# Patient Record
Sex: FEMALE | Race: WHITE | HISPANIC OR LATINO | Employment: OTHER | URBAN - METROPOLITAN AREA
[De-identification: names, ages, dates, MRNs, and addresses within clinical notes are randomized per-mention and may not be internally consistent; named-entity substitution may affect disease eponyms.]

---

## 2017-01-17 ENCOUNTER — OFFICE VISIT (OUTPATIENT)
Dept: INTERNAL MEDICINE | Facility: CLINIC | Age: 82
End: 2017-01-17
Attending: INTERNAL MEDICINE
Payer: MEDICARE

## 2017-01-17 VITALS
HEART RATE: 76 BPM | HEIGHT: 58 IN | OXYGEN SATURATION: 90 % | DIASTOLIC BLOOD PRESSURE: 86 MMHG | SYSTOLIC BLOOD PRESSURE: 126 MMHG | WEIGHT: 131 LBS | BODY MASS INDEX: 27.5 KG/M2

## 2017-01-17 DIAGNOSIS — N18.30 TYPE 2 DIABETES MELLITUS WITH STAGE 3 CHRONIC KIDNEY DISEASE, WITHOUT LONG-TERM CURRENT USE OF INSULIN: ICD-10-CM

## 2017-01-17 DIAGNOSIS — E11.40 TYPE 2 DIABETES MELLITUS WITH DIABETIC NEUROPATHY, WITHOUT LONG-TERM CURRENT USE OF INSULIN: ICD-10-CM

## 2017-01-17 DIAGNOSIS — Z00.00 ROUTINE HISTORY AND PHYSICAL EXAMINATION OF ADULT: ICD-10-CM

## 2017-01-17 DIAGNOSIS — D69.6 THROMBOCYTOPENIA: ICD-10-CM

## 2017-01-17 DIAGNOSIS — I10 ESSENTIAL HYPERTENSION: ICD-10-CM

## 2017-01-17 DIAGNOSIS — E03.9 ACQUIRED HYPOTHYROIDISM: ICD-10-CM

## 2017-01-17 DIAGNOSIS — D50.9 IRON DEFICIENCY ANEMIA, UNSPECIFIED IRON DEFICIENCY ANEMIA TYPE: Primary | ICD-10-CM

## 2017-01-17 DIAGNOSIS — E11.22 TYPE 2 DIABETES MELLITUS WITH STAGE 3 CHRONIC KIDNEY DISEASE, WITHOUT LONG-TERM CURRENT USE OF INSULIN: ICD-10-CM

## 2017-01-17 DIAGNOSIS — E11.49 TYPE II OR UNSPECIFIED TYPE DIABETES MELLITUS WITH NEUROLOGICAL MANIFESTATIONS, NOT STATED AS UNCONTROLLED(250.60): ICD-10-CM

## 2017-01-17 PROCEDURE — 1159F MED LIST DOCD IN RCRD: CPT | Mod: S$GLB,,, | Performed by: INTERNAL MEDICINE

## 2017-01-17 PROCEDURE — 3079F DIAST BP 80-89 MM HG: CPT | Mod: S$GLB,,, | Performed by: INTERNAL MEDICINE

## 2017-01-17 PROCEDURE — 1160F RVW MEDS BY RX/DR IN RCRD: CPT | Mod: S$GLB,,, | Performed by: INTERNAL MEDICINE

## 2017-01-17 PROCEDURE — 99214 OFFICE O/P EST MOD 30 MIN: CPT | Mod: 25,S$GLB,, | Performed by: INTERNAL MEDICINE

## 2017-01-17 PROCEDURE — 3074F SYST BP LT 130 MM HG: CPT | Mod: S$GLB,,, | Performed by: INTERNAL MEDICINE

## 2017-01-17 PROCEDURE — 1157F ADVNC CARE PLAN IN RCRD: CPT | Mod: S$GLB,,, | Performed by: INTERNAL MEDICINE

## 2017-01-17 RX ORDER — GABAPENTIN 100 MG/1
4 CAPSULE ORAL NIGHTLY
Refills: 1 | COMMUNITY
Start: 2016-11-16 | End: 2017-05-22

## 2017-01-17 NOTE — MR AVS SNAPSHOT
[facility]  [address] [facility], [address] [place] [address]  Phone: [phone]  Fax: [phone]                  Ashely Holman   2017 3:30 PM   Office Visit    Descripción:  Female : 3/29/1934   Personal Médico:  RODRIGO Badillo MD   Departamento:  [place]           Razón de la stephanie     Follow-up     Generalized Body Aches           Diagnósticos de Esta Visita        Comentarios    Iron deficiency anemia, unspecified iron deficiency anemia type    -  Primario     Acquired hypothyroidism         Thrombocytopenia         Type II or unspecified type diabetes mellitus with neurological manifestations, not stated as uncontrolled         Essential hypertension         Type 2 diabetes mellitus with stage 3 chronic kidney disease, without long-term current use of insulin         Routine history and physical examination of adult                Lista de tareas           Citas próximas        Personal Médico Departamento Tfno del dpto    2017 2:30 PM MD [facility] Pham [phone]      Metas (5 Years of Data)     Ninguna      Follow-Up and Disposition     Return in about 4 months (around 2017).      [facility]     [facility] - Asistencia   Enfermeras registradas de [facility] pueden ayudarle a reservar angela stephanie, proveer educación para la lio, asesoría clínica, y otros servicios de asesoramiento.   Llame para kathryn servicio gratuito a [phone].             Medicamentos           Mensaje sobre Medicamentos     Verificar los cambios y / o adiciones a mortensen régimen de medicación son los mismos que discutir con mortensen médico. Si cualquiera de estos cambios o adiciones son incorrectos, por favor notifique a mortensen proveedor de atención médica.        DEJAR de lisa estos medicamentos     azithromycin (Z-CLAUDIA) 250 MG tablet Take 2 tablets by mouth on day 1; Take 1 tablet by mouth on days 2-5           Verifique que la siguiente lista de medicamentos es angela  "representación exacta de los medicamentos que está tomando actualmente. Si no hay ningunos reportados, la lista puede estar en de leon. Si no es correcta, por favor póngase en contacto con mortensen proveedor de atención médica. Lleve esta lista con usted en haley de emergencia.           Medicamentos Actuales     albuterol-ipratropium 2.5mg-0.5mg/3mL (DUO-NEB) 0.5 mg-3 mg(2.5 mg base)/3 mL nebulizer solution Take 3 mLs by nebulization every 4 (four) hours.    budesonide-formoterol 160-4.5 mcg (SYMBICORT) 160-4.5 mcg/actuation HFAA Inhale 2 puffs into the lungs every 12 (twelve) hours.    cetirizine (ZYRTEC) 10 MG tablet Take 10 mg by mouth every evening.    cholecalciferol, vitamin D3, 2,000 unit Cap Take 1 capsule by mouth once daily.    ferrous gluconate (FERGON) 325 MG Tab Take 1 tablet (325 mg total) by mouth 2 (two) times daily with meals.    gabapentin (NEURONTIN) 100 MG capsule Take 4 capsules by mouth every evening.    levothyroxine (SYNTHROID) 88 MCG tablet Take 0.5 tablets (44 mcg total) by mouth once daily.    losartan (COZAAR) 100 MG tablet TAKE 1 TABLET BY MOUTH EVERY DAY    mirtazapine (REMERON) 15 MG tablet Take 1 tablet (15 mg total) by mouth every evening.    NEXIUM 40 mg capsule TAKE 1 CAPSULE BY MOUTH EVERY DAY    travoprost, benzalkonium, (TRAVATAN) 0.004 % ophthalmic solution 1 drop every evening.           Información de referencia clínica           Signos vitales - más recientes  Última actualización: 1/17/2017  3:33 PM por Mira Duran, MA    PS [place] SpO2 BMI ([facility] [place])    126/86 76 4' 10" (1.473 m) 59.4 kg (131 lb) (!) 90% 27.38 kg/m2      Blood Pressure          Most Recent Value    BP  126/86      Alergias     A partir del:  1/17/2017        Lyrica [Pregabalin]    Cymbalta [Duloxetine]      Vacunas     Administradas en la fecha de la visita:  1/17/2017        None      Orders Placed During Today's Visit      Órdenes normales de esta visita    CBC auto differential     Comprehensive " Metabolic Panel (Refl)     Hemoglobin A1c     Iron and TIBC     T4, free     TSH     Exámenes/Procedimientos futuros Se espera el Vence    CBC auto differential  2017 (Approximate) 3/18/2018    Comprehensive Metabolic Panel (Refl)  2017 3/18/2018    Hemoglobin A1c  2017 (Approximate) 3/18/2018    Iron and TIBC  2017    T4, free  2017 (Approximate) 2018    TSH  2017 (Approximate) 3/18/2018               Ashely Holman   2017 3:30 PM   Office Visit    Description:  Female : 3/29/1934   Provider:  RODRIGO Badillo MD   Department:  [facility]           Reason for Visit     Follow-up     Generalized Body Aches           Diagnoses this Visit        Comments    Iron deficiency anemia, unspecified iron deficiency anemia type    -  Primary     Acquired hypothyroidism         Thrombocytopenia         Type II or unspecified type diabetes mellitus with neurological manifestations, not stated as uncontrolled         Essential hypertension         Type 2 diabetes mellitus with stage 3 chronic kidney disease, without long-term current use of insulin         Routine history and physical examination of adult                To Do List           Future Appointments        Provider Department Dept Phone    2017 2:30 PM MD [facility] Pham [phone]      Goals     None      Follow-Up and Disposition     Return in about 4 months (around 2017).      [facility] Care [facility] - 24/7 Assistance  Registered nurses in the [facility] provide clinical advisement, health education, appointment booking, and other advisory services.  Call for this free service at [phone].             Medications           Message regarding Medications     Verify the changes and/or additions to your medication regime listed below are the same as discussed with your clinician today.  If any of these changes or additions are incorrect, please notify your  "healthcare provider.        STOP taking these medications     azithromycin (Z-CLAUDIA) 250 MG tablet Take 2 tablets by mouth on day 1; Take 1 tablet by mouth on days 2-5           Verify that the below list of medications is an accurate representation of the medications you are currently taking.  If none reported, the list may be blank. If incorrect, please contact your healthcare provider. Carry this list with you in case of emergency.           Current Medications     albuterol-ipratropium 2.5mg-0.5mg/3mL (DUO-NEB) 0.5 mg-3 mg(2.5 mg base)/3 mL nebulizer solution Take 3 mLs by nebulization every 4 (four) hours.    budesonide-formoterol 160-4.5 mcg (SYMBICORT) 160-4.5 mcg/actuation HFAA Inhale 2 puffs into the lungs every 12 (twelve) hours.    cetirizine (ZYRTEC) 10 MG tablet Take 10 mg by mouth every evening.    cholecalciferol, vitamin D3, 2,000 unit Cap Take 1 capsule by mouth once daily.    ferrous gluconate (FERGON) 325 MG Tab Take 1 tablet (325 mg total) by mouth 2 (two) times daily with meals.    gabapentin (NEURONTIN) 100 MG capsule Take 4 capsules by mouth every evening.    levothyroxine (SYNTHROID) 88 MCG tablet Take 0.5 tablets (44 mcg total) by mouth once daily.    losartan (COZAAR) 100 MG tablet TAKE 1 TABLET BY MOUTH EVERY DAY    mirtazapine (REMERON) 15 MG tablet Take 1 tablet (15 mg total) by mouth every evening.    NEXIUM 40 mg capsule TAKE 1 CAPSULE BY MOUTH EVERY DAY    travoprost, benzalkonium, (TRAVATAN) 0.004 % ophthalmic solution 1 drop every evening.           Clinical Reference Information           Vital Signs - Last Recorded  Most recent update: 1/17/2017  3:33 PM by Mira Duran MA    BP Pulse Ht Wt SpO2 BMI    126/86 76 4' 10" (1.473 m) 59.4 kg (131 lb) (!) 90% 27.38 kg/m2      Blood Pressure          Most Recent Value    BP  126/86      Allergies as of 1/17/2017     Lyrica [Pregabalin]    Cymbalta [Duloxetine]      Immunizations Administered on Date of Encounter - 1/17/2017     None "      Orders Placed During Today's Visit      Normal Orders This Visit    CBC auto differential     Comprehensive Metabolic Panel (Refl)     Hemoglobin A1c     Iron and TIBC     T4, free     TSH     Future Labs/Procedures Expected by Expires    CBC auto differential  1/17/2017 (Approximate) 3/18/2018    Comprehensive Metabolic Panel (Refl)  1/17/2017 3/18/2018    Hemoglobin A1c  1/17/2017 (Approximate) 3/18/2018    Iron and TIBC  1/17/2017 1/17/2018    T4, free  1/17/2017 (Approximate) 1/17/2018    TSH  1/17/2017 (Approximate) 3/18/2018

## 2017-01-17 NOTE — PROGRESS NOTES
Subjective:       Patient ID: Ashely Holman is a 82 y.o. female.    Chief Complaint: Follow-up and Generalized Body Aches (wants to increase the Gabapentin)    HPI Comments: Wants to increase Celso for neuropathy.  Depression much better on Remeron.  Asthma doing well.    Hypertension   This is a chronic problem. The current episode started more than 1 year ago. The problem is controlled. Pertinent negatives include no chest pain or shortness of breath.   Diabetes   She presents for her follow-up diabetic visit. She has type 2 diabetes mellitus. Her disease course has been stable. Pertinent negatives for diabetes include no chest pain.     Review of Systems   Constitutional: Negative.    Respiratory: Negative for shortness of breath.    Cardiovascular: Negative for chest pain.   Neurological: Positive for numbness.   Psychiatric/Behavioral: Negative for dysphoric mood.       Objective:      Physical Exam   Constitutional: She appears well-developed and well-nourished.   HENT:   Head: Normocephalic.   Eyes: Pupils are equal, round, and reactive to light.   Cardiovascular: Normal rate, regular rhythm and normal heart sounds.  Exam reveals no friction rub.    Pulmonary/Chest: Effort normal.   Neurological: She is alert.       Assessment:       1. Iron deficiency anemia, unspecified iron deficiency anemia type    2. Acquired hypothyroidism    3. Thrombocytopenia    4. Type II or unspecified type diabetes mellitus with neurological manifestations, not stated as uncontrolled    5. Essential hypertension    6. Type 2 diabetes mellitus with stage 3 chronic kidney disease, without long-term current use of insulin    7. Routine history and physical examination of adult    8. 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  Continue meds/diet for DM, HTN, hypothyroid, and asthma, which are stable. Inc Celso for neuropathy. Check labs for thrombocytopenia.    Screening: The patient was  screened for depression with the PHQ2 questionnaire and possible health consequences were discussed with the patient, who understands (15 minutes spent). The patient was screened for the misuse of alcohol, by asking the number of drinks per average week, and if pt has had more than 4 drinks (more than 3 for women and elderly) in 1 day within the past year. The health and legal consequences of misuse were discussed (15 minutes spent). The patient was screened for obesity (BMI>30), If the current BMI > 30, then the possible consequences of obesity, as well as the benefits of diet, exercise, and weight loss were discussed (15 minutes spent).

## 2017-02-05 LAB
ALBUMIN SERPL-MCNC: 4 G/DL (ref 3.6–5.1)
ALBUMIN/GLOB SERPL: 1.4 (CALC) (ref 1–2.5)
ALP SERPL-CCNC: 68 U/L (ref 33–130)
ALT SERPL-CCNC: 6 U/L (ref 6–29)
AST SERPL-CCNC: 11 U/L (ref 10–35)
BASOPHILS # BLD AUTO: 24 CELLS/UL (ref 0–200)
BASOPHILS NFR BLD AUTO: 0.4 %
BILIRUB SERPL-MCNC: 0.2 MG/DL (ref 0.2–1.2)
BUN SERPL-MCNC: 24 MG/DL (ref 7–25)
BUN/CREAT SERPL: 16 (CALC) (ref 6–22)
CALCIUM SERPL-MCNC: 9.8 MG/DL (ref 8.6–10.4)
CHLORIDE SERPL-SCNC: 108 MMOL/L (ref 98–110)
CO2 SERPL-SCNC: 25 MMOL/L (ref 20–31)
CREAT SERPL-MCNC: 1.48 MG/DL (ref 0.6–0.88)
EOSINOPHIL # BLD AUTO: 287 CELLS/UL (ref 15–500)
EOSINOPHIL NFR BLD AUTO: 4.7 %
ERYTHROCYTE [DISTWIDTH] IN BLOOD BY AUTOMATED COUNT: 16.6 % (ref 11–15)
GFR SERPL CREATININE-BSD FRML MDRD: 33 ML/MIN/1.73M2
GLOBULIN SER CALC-MCNC: 2.9 G/DL (CALC) (ref 1.9–3.7)
GLUCOSE SERPL-MCNC: 98 MG/DL (ref 65–99)
HBA1C MFR BLD: 5.7 % OF TOTAL HGB
HCT VFR BLD AUTO: 33.2 % (ref 35–45)
HGB BLD-MCNC: 10.7 G/DL (ref 11.7–15.5)
IRON SATN MFR SERPL: 13 % (CALC) (ref 11–50)
IRON SERPL-MCNC: 44 MCG/DL (ref 45–160)
LYMPHOCYTES # BLD AUTO: 1305 CELLS/UL (ref 850–3900)
LYMPHOCYTES NFR BLD AUTO: 21.4 %
MCH RBC QN AUTO: 27.7 PG (ref 27–33)
MCHC RBC AUTO-ENTMCNC: 32.4 G/DL (ref 32–36)
MCV RBC AUTO: 85.6 FL (ref 80–100)
MONOCYTES # BLD AUTO: 653 CELLS/UL (ref 200–950)
MONOCYTES NFR BLD AUTO: 10.7 %
NEUTROPHILS # BLD AUTO: 3831 CELLS/UL (ref 1500–7800)
NEUTROPHILS NFR BLD AUTO: 62.8 %
PLATELET # BLD AUTO: 115 THOUSAND/UL (ref 140–400)
PMV BLD REES-ECKER: 9.7 FL (ref 7.5–12.5)
POTASSIUM SERPL-SCNC: 4 MMOL/L (ref 3.5–5.3)
PROT SERPL-MCNC: 6.9 G/DL (ref 6.1–8.1)
RBC # BLD AUTO: 3.88 MILLION/UL (ref 3.8–5.1)
SODIUM SERPL-SCNC: 139 MMOL/L (ref 135–146)
T4 FREE SERPL-MCNC: 1.4 NG/DL (ref 0.8–1.8)
TIBC SERPL-MCNC: 343 MCG/DL (CALC) (ref 250–450)
TSH SERPL-ACNC: 0.14 MIU/L (ref 0.4–4.5)
WBC # BLD AUTO: 6.1 THOUSAND/UL (ref 3.8–10.8)

## 2017-02-09 DIAGNOSIS — R51.9 HEADACHE, UNSPECIFIED HEADACHE TYPE: Primary | ICD-10-CM

## 2017-05-22 ENCOUNTER — OFFICE VISIT (OUTPATIENT)
Dept: INTERNAL MEDICINE | Facility: CLINIC | Age: 82
End: 2017-05-22
Attending: INTERNAL MEDICINE
Payer: MEDICARE

## 2017-05-22 VITALS
BODY MASS INDEX: 27.08 KG/M2 | SYSTOLIC BLOOD PRESSURE: 130 MMHG | WEIGHT: 129 LBS | HEART RATE: 102 BPM | DIASTOLIC BLOOD PRESSURE: 88 MMHG | OXYGEN SATURATION: 99 % | HEIGHT: 58 IN

## 2017-05-22 DIAGNOSIS — R41.0 CONFUSION: ICD-10-CM

## 2017-05-22 DIAGNOSIS — I10 ESSENTIAL HYPERTENSION: ICD-10-CM

## 2017-05-22 DIAGNOSIS — D50.9 IRON DEFICIENCY ANEMIA, UNSPECIFIED IRON DEFICIENCY ANEMIA TYPE: ICD-10-CM

## 2017-05-22 DIAGNOSIS — E03.9 ACQUIRED HYPOTHYROIDISM: ICD-10-CM

## 2017-05-22 DIAGNOSIS — E11.22 TYPE 2 DIABETES MELLITUS WITH STAGE 3 CHRONIC KIDNEY DISEASE, WITHOUT LONG-TERM CURRENT USE OF INSULIN: ICD-10-CM

## 2017-05-22 DIAGNOSIS — Z00.00 ROUTINE HISTORY AND PHYSICAL EXAMINATION OF ADULT: ICD-10-CM

## 2017-05-22 DIAGNOSIS — N18.30 CKD (CHRONIC KIDNEY DISEASE) STAGE 3, GFR 30-59 ML/MIN: Primary | ICD-10-CM

## 2017-05-22 DIAGNOSIS — J45.20 MILD INTERMITTENT ASTHMA WITHOUT COMPLICATION: ICD-10-CM

## 2017-05-22 DIAGNOSIS — N18.30 TYPE 2 DIABETES MELLITUS WITH STAGE 3 CHRONIC KIDNEY DISEASE, WITHOUT LONG-TERM CURRENT USE OF INSULIN: ICD-10-CM

## 2017-05-22 DIAGNOSIS — R05.9 COUGH: ICD-10-CM

## 2017-05-22 PROCEDURE — 1159F MED LIST DOCD IN RCRD: CPT | Mod: S$GLB,,, | Performed by: INTERNAL MEDICINE

## 2017-05-22 PROCEDURE — 1160F RVW MEDS BY RX/DR IN RCRD: CPT | Mod: S$GLB,,, | Performed by: INTERNAL MEDICINE

## 2017-05-22 PROCEDURE — 3075F SYST BP GE 130 - 139MM HG: CPT | Mod: S$GLB,,, | Performed by: INTERNAL MEDICINE

## 2017-05-22 PROCEDURE — 99215 OFFICE O/P EST HI 40 MIN: CPT | Mod: S$GLB,,, | Performed by: INTERNAL MEDICINE

## 2017-05-22 PROCEDURE — 3079F DIAST BP 80-89 MM HG: CPT | Mod: S$GLB,,, | Performed by: INTERNAL MEDICINE

## 2017-05-22 RX ORDER — DOXYCYCLINE HYCLATE 100 MG
100 TABLET ORAL 2 TIMES DAILY
Qty: 14 TABLET | Refills: 0 | Status: SHIPPED | OUTPATIENT
Start: 2017-05-22 | End: 2017-09-25

## 2017-05-22 RX ORDER — TRAZODONE HYDROCHLORIDE 50 MG/1
TABLET ORAL
Refills: 3 | COMMUNITY
Start: 2017-05-14 | End: 2017-09-11 | Stop reason: SDUPTHER

## 2017-05-22 RX ORDER — BUDESONIDE AND FORMOTEROL FUMARATE DIHYDRATE 160; 4.5 UG/1; UG/1
2 AEROSOL RESPIRATORY (INHALATION) EVERY 12 HOURS
Qty: 10.2 G | Refills: 5 | Status: SHIPPED | OUTPATIENT
Start: 2017-05-22 | End: 2018-08-21

## 2017-05-22 NOTE — PROGRESS NOTES
Subjective:       Patient ID: Ashely Holman is a 83 y.o. female.    Chief Complaint: Follow-up (4 month / wants to take her off Gabapentin (daughter says she hallucinates)); Cough (deep cough); Shortness of Breath; and Headache    Cough started yest.  Took Celso and Tramadol a few weeks ago and was confused for a few days. Off Celso now.  Recent neck pain.      Cough   This is a recurrent problem. The current episode started yesterday. The problem has been unchanged. Pertinent negatives include no chest pain or shortness of breath.   Diabetes   She has type 2 diabetes mellitus. Her disease course has been stable. Associated symptoms include fatigue. Pertinent negatives for diabetes include no chest pain.   Hypertension   This is a chronic problem. The current episode started more than 1 year ago. The problem is controlled. Associated symptoms include neck pain. Pertinent negatives include no chest pain or shortness of breath.     Review of Systems   Constitutional: Positive for fatigue.   HENT: Positive for sinus pressure.    Respiratory: Positive for cough. Negative for shortness of breath.    Cardiovascular: Negative for chest pain.   Musculoskeletal: Positive for arthralgias and neck pain.       Objective:      Physical Exam   Constitutional: She appears well-developed and well-nourished.   HENT:   Head: Normocephalic.   Eyes: Pupils are equal, round, and reactive to light.   Cardiovascular: Normal rate, regular rhythm and normal heart sounds.  Exam reveals no friction rub.    Pulmonary/Chest: Effort normal. She has wheezes.   Neurological: She is alert.       Assessment:       1. CKD (chronic kidney disease) stage 3, GFR 30-59 ml/min    2. Essential hypertension    3. Acquired hypothyroidism    4. Mild intermittent asthma without complication    5. Type 2 diabetes mellitus with stage 3 chronic kidney disease, without long-term current use of insulin    6. Routine history and physical examination of adult    7.  Cough    8. Confusion        Plan:       Per orders and D/C instructions.  Continue meds/diet for DM, HTN, hypothyroid, anemia, and CKD, which are stable.  Stay off Celso due to confusion.  Doxy and Symbicort for asthma with cough and wheezing.  Check labs. Prob decrease Synthroid (per daughter's request).

## 2017-05-25 LAB
25(OH)D3+25(OH)D2 SERPL-MCNC: 48 NG/ML (ref 30–100)
ALBUMIN SERPL-MCNC: 4.1 G/DL (ref 3.6–5.1)
ALBUMIN/GLOB SERPL: 1.2 (CALC) (ref 1–2.5)
ALP SERPL-CCNC: 82 U/L (ref 33–130)
ALT SERPL-CCNC: 9 U/L (ref 6–29)
AST SERPL-CCNC: 13 U/L (ref 10–35)
BASOPHILS # BLD AUTO: 34 CELLS/UL (ref 0–200)
BASOPHILS NFR BLD AUTO: 0.4 %
BILIRUB SERPL-MCNC: 0.4 MG/DL (ref 0.2–1.2)
BUN SERPL-MCNC: 24 MG/DL (ref 7–25)
BUN/CREAT SERPL: 14 (CALC) (ref 6–22)
CALCIUM SERPL-MCNC: 10 MG/DL (ref 8.6–10.4)
CALCIUM SERPL-MCNC: 10.2 MG/DL (ref 8.6–10.4)
CHLORIDE SERPL-SCNC: 105 MMOL/L (ref 98–110)
CHOLEST SERPL-MCNC: 168 MG/DL (ref 125–200)
CHOLEST/HDLC SERPL: 2.9 (CALC)
CO2 SERPL-SCNC: 26 MMOL/L (ref 20–31)
CREAT SERPL-MCNC: 1.67 MG/DL (ref 0.6–0.88)
EOSINOPHIL # BLD AUTO: 284 CELLS/UL (ref 15–500)
EOSINOPHIL NFR BLD AUTO: 3.3 %
ERYTHROCYTE [DISTWIDTH] IN BLOOD BY AUTOMATED COUNT: 18.3 % (ref 11–15)
GFR SERPL CREATININE-BSD FRML MDRD: 28 ML/MIN/1.73M2
GLOBULIN SER CALC-MCNC: 3.4 G/DL (CALC) (ref 1.9–3.7)
GLUCOSE SERPL-MCNC: 108 MG/DL (ref 65–99)
HBA1C MFR BLD: 5.4 % OF TOTAL HGB
HCT VFR BLD AUTO: 38.2 % (ref 35–45)
HDLC SERPL-MCNC: 58 MG/DL
HGB BLD-MCNC: 12.5 G/DL (ref 11.7–15.5)
IRON SATN MFR SERPL: 18 % (CALC) (ref 11–50)
IRON SERPL-MCNC: 67 MCG/DL (ref 45–160)
LDLC SERPL CALC-MCNC: 68 MG/DL (CALC)
LYMPHOCYTES # BLD AUTO: 1668 CELLS/UL (ref 850–3900)
LYMPHOCYTES NFR BLD AUTO: 19.4 %
MCH RBC QN AUTO: 28.9 PG (ref 27–33)
MCHC RBC AUTO-ENTMCNC: 32.7 G/DL (ref 32–36)
MCV RBC AUTO: 88.3 FL (ref 80–100)
MONOCYTES # BLD AUTO: 808 CELLS/UL (ref 200–950)
MONOCYTES NFR BLD AUTO: 9.4 %
NEUTROPHILS # BLD AUTO: 5805 CELLS/UL (ref 1500–7800)
NEUTROPHILS NFR BLD AUTO: 67.5 %
NONHDLC SERPL-MCNC: 110 MG/DL (CALC)
PLATELET # BLD AUTO: 152 THOUSAND/UL (ref 140–400)
PMV BLD REES-ECKER: 9.6 FL (ref 7.5–12.5)
POTASSIUM SERPL-SCNC: 4.2 MMOL/L (ref 3.5–5.3)
PROT SERPL-MCNC: 7.5 G/DL (ref 6.1–8.1)
PTH-INTACT SERPL-MCNC: 104 PG/ML (ref 14–64)
RBC # BLD AUTO: 4.32 MILLION/UL (ref 3.8–5.1)
SODIUM SERPL-SCNC: 140 MMOL/L (ref 135–146)
T4 FREE SERPL-MCNC: 1.5 NG/DL (ref 0.8–1.8)
TIBC SERPL-MCNC: 377 MCG/DL (CALC) (ref 250–450)
TRIGL SERPL-MCNC: 210 MG/DL
TSH SERPL-ACNC: 0.19 MIU/L (ref 0.4–4.5)
VIT B12 SERPL-MCNC: 538 PG/ML (ref 200–1100)
VITAMIN D2 SERPL-MCNC: <4 NG/ML
VITAMIN D3 SERPL-MCNC: 48 NG/ML
WBC # BLD AUTO: 8.6 THOUSAND/UL (ref 3.8–10.8)

## 2017-07-24 DIAGNOSIS — E11.9 DIABETES MELLITUS WITHOUT COMPLICATION: Primary | ICD-10-CM

## 2017-07-24 RX ORDER — METFORMIN HYDROCHLORIDE 500 MG/1
500 TABLET ORAL
Qty: 90 TABLET | Refills: 1 | Status: SHIPPED | OUTPATIENT
Start: 2017-07-24 | End: 2021-02-01

## 2017-09-11 RX ORDER — TRAZODONE HYDROCHLORIDE 50 MG/1
TABLET ORAL
Qty: 30 TABLET | Refills: 11 | Status: SHIPPED | OUTPATIENT
Start: 2017-09-11 | End: 2018-05-21 | Stop reason: SDUPTHER

## 2017-09-25 ENCOUNTER — OFFICE VISIT (OUTPATIENT)
Dept: INTERNAL MEDICINE | Facility: CLINIC | Age: 82
End: 2017-09-25
Attending: INTERNAL MEDICINE
Payer: MEDICARE

## 2017-09-25 VITALS
SYSTOLIC BLOOD PRESSURE: 124 MMHG | WEIGHT: 136 LBS | HEART RATE: 47 BPM | HEIGHT: 58 IN | DIASTOLIC BLOOD PRESSURE: 84 MMHG | BODY MASS INDEX: 28.55 KG/M2 | OXYGEN SATURATION: 97 %

## 2017-09-25 DIAGNOSIS — N18.30 TYPE 2 DIABETES MELLITUS WITH STAGE 3 CHRONIC KIDNEY DISEASE, WITHOUT LONG-TERM CURRENT USE OF INSULIN: ICD-10-CM

## 2017-09-25 DIAGNOSIS — J45.20 MILD INTERMITTENT ASTHMA WITHOUT COMPLICATION: ICD-10-CM

## 2017-09-25 DIAGNOSIS — M54.41 CHRONIC RIGHT-SIDED LOW BACK PAIN WITH RIGHT-SIDED SCIATICA: ICD-10-CM

## 2017-09-25 DIAGNOSIS — G89.29 CHRONIC RIGHT-SIDED LOW BACK PAIN WITH RIGHT-SIDED SCIATICA: ICD-10-CM

## 2017-09-25 DIAGNOSIS — S03.00XA TMJ (DISLOCATION OF TEMPOROMANDIBULAR JOINT), INITIAL ENCOUNTER: ICD-10-CM

## 2017-09-25 DIAGNOSIS — I10 ESSENTIAL HYPERTENSION: Primary | ICD-10-CM

## 2017-09-25 DIAGNOSIS — E11.22 TYPE 2 DIABETES MELLITUS WITH STAGE 3 CHRONIC KIDNEY DISEASE, WITHOUT LONG-TERM CURRENT USE OF INSULIN: ICD-10-CM

## 2017-09-25 PROCEDURE — 90662 IIV NO PRSV INCREASED AG IM: CPT | Mod: S$GLB,,, | Performed by: INTERNAL MEDICINE

## 2017-09-25 PROCEDURE — G0008 ADMIN INFLUENZA VIRUS VAC: HCPCS | Mod: S$GLB,,, | Performed by: INTERNAL MEDICINE

## 2017-09-25 PROCEDURE — 1159F MED LIST DOCD IN RCRD: CPT | Mod: S$GLB,,, | Performed by: INTERNAL MEDICINE

## 2017-09-25 PROCEDURE — 99214 OFFICE O/P EST MOD 30 MIN: CPT | Mod: S$GLB,,, | Performed by: INTERNAL MEDICINE

## 2017-09-25 PROCEDURE — 3079F DIAST BP 80-89 MM HG: CPT | Mod: S$GLB,,, | Performed by: INTERNAL MEDICINE

## 2017-09-25 PROCEDURE — 3008F BODY MASS INDEX DOCD: CPT | Mod: S$GLB,,, | Performed by: INTERNAL MEDICINE

## 2017-09-25 PROCEDURE — 3074F SYST BP LT 130 MM HG: CPT | Mod: S$GLB,,, | Performed by: INTERNAL MEDICINE

## 2017-09-25 RX ORDER — TIZANIDINE 2 MG/1
TABLET ORAL
Qty: 30 TABLET | Refills: 3 | Status: SHIPPED | OUTPATIENT
Start: 2017-09-25 | End: 2018-05-21

## 2017-09-25 NOTE — PROGRESS NOTES
Subjective:       Patient ID: Ashely Holman is a 83 y.o. female.    Chief Complaint: Follow-up; Low-back Pain; Leg Pain (both legs); and Sinus Problem (head pressure)    LBP to right leg.  Right ear pain.      Low-back Pain   This is a new problem. The problem occurs constantly. The problem has been unchanged. Associated symptoms include arthralgias. Pertinent negatives include no chest pain.   Diabetes   She presents for her follow-up diabetic visit. She has type 2 diabetes mellitus. Her disease course has been stable. Pertinent negatives for diabetes include no chest pain.   Hypertension   This is a chronic problem. The current episode started more than 1 year ago. The problem is controlled. Pertinent negatives include no chest pain or shortness of breath.     Review of Systems   Constitutional: Negative.    Respiratory: Negative for shortness of breath.    Cardiovascular: Negative for chest pain.   Musculoskeletal: Positive for arthralgias and back pain.       Objective:      Physical Exam   Constitutional: She appears well-developed and well-nourished.   HENT:   Head: Normocephalic.       Tender right TMJ   Eyes: Pupils are equal, round, and reactive to light.   Cardiovascular: Normal rate, regular rhythm and normal heart sounds.  Exam reveals no friction rub.    Pulmonary/Chest: Effort normal.   Neurological: She is alert. She has normal strength.   SLR neg       Assessment:       1. Essential hypertension    2. Type 2 diabetes mellitus with stage 3 chronic kidney disease, without long-term current use of insulin    3. Mild intermittent asthma without complication    4. TMJ (dislocation of temporomandibular joint), initial encounter    5. Chronic right-sided low back pain with right-sided sciatica        Plan:       Per orders and D/C instructions.  Continue meds/diet for DM, HTN, and Asthma, which are stable.     Soft food for TMJ.  Zanaflex at night for LBP.

## 2018-01-15 ENCOUNTER — OFFICE VISIT (OUTPATIENT)
Dept: INTERNAL MEDICINE | Facility: CLINIC | Age: 83
End: 2018-01-15
Attending: INTERNAL MEDICINE
Payer: MEDICARE

## 2018-01-15 VITALS
HEART RATE: 101 BPM | HEIGHT: 58 IN | DIASTOLIC BLOOD PRESSURE: 80 MMHG | BODY MASS INDEX: 29.6 KG/M2 | SYSTOLIC BLOOD PRESSURE: 118 MMHG | WEIGHT: 141 LBS | OXYGEN SATURATION: 97 % | TEMPERATURE: 99 F

## 2018-01-15 DIAGNOSIS — Z13.31 SCREENING FOR DEPRESSION: ICD-10-CM

## 2018-01-15 DIAGNOSIS — R05.9 COUGH: ICD-10-CM

## 2018-01-15 DIAGNOSIS — E11.8 TYPE 2 DIABETES MELLITUS WITH COMPLICATION, WITHOUT LONG-TERM CURRENT USE OF INSULIN: ICD-10-CM

## 2018-01-15 DIAGNOSIS — J32.9 SINUSITIS, UNSPECIFIED CHRONICITY, UNSPECIFIED LOCATION: ICD-10-CM

## 2018-01-15 DIAGNOSIS — D50.9 IRON DEFICIENCY ANEMIA, UNSPECIFIED IRON DEFICIENCY ANEMIA TYPE: ICD-10-CM

## 2018-01-15 DIAGNOSIS — Z00.00 ROUTINE HISTORY AND PHYSICAL EXAMINATION OF ADULT: ICD-10-CM

## 2018-01-15 DIAGNOSIS — E03.9 ACQUIRED HYPOTHYROIDISM: ICD-10-CM

## 2018-01-15 DIAGNOSIS — R50.9 FEVER, UNSPECIFIED FEVER CAUSE: ICD-10-CM

## 2018-01-15 DIAGNOSIS — Z13.39 SCREENING FOR ALCOHOLISM: ICD-10-CM

## 2018-01-15 DIAGNOSIS — I10 ESSENTIAL HYPERTENSION: ICD-10-CM

## 2018-01-15 DIAGNOSIS — N18.30 CONTROLLED TYPE 2 DIABETES MELLITUS WITH STAGE 3 CHRONIC KIDNEY DISEASE, WITHOUT LONG-TERM CURRENT USE OF INSULIN: Primary | ICD-10-CM

## 2018-01-15 DIAGNOSIS — E11.22 CONTROLLED TYPE 2 DIABETES MELLITUS WITH STAGE 3 CHRONIC KIDNEY DISEASE, WITHOUT LONG-TERM CURRENT USE OF INSULIN: Primary | ICD-10-CM

## 2018-01-15 DIAGNOSIS — J45.20 MILD INTERMITTENT ASTHMA WITHOUT COMPLICATION: ICD-10-CM

## 2018-01-15 DIAGNOSIS — N18.30 CKD (CHRONIC KIDNEY DISEASE) STAGE 3, GFR 30-59 ML/MIN: ICD-10-CM

## 2018-01-15 PROCEDURE — G0442 ANNUAL ALCOHOL SCREEN 15 MIN: HCPCS | Mod: 59,S$GLB,, | Performed by: INTERNAL MEDICINE

## 2018-01-15 PROCEDURE — 96372 THER/PROPH/DIAG INJ SC/IM: CPT | Mod: S$GLB,,, | Performed by: INTERNAL MEDICINE

## 2018-01-15 PROCEDURE — G0444 DEPRESSION SCREEN ANNUAL: HCPCS | Mod: 59,S$GLB,, | Performed by: INTERNAL MEDICINE

## 2018-01-15 PROCEDURE — 99215 OFFICE O/P EST HI 40 MIN: CPT | Mod: 25,S$GLB,, | Performed by: INTERNAL MEDICINE

## 2018-01-15 RX ORDER — DOXYCYCLINE HYCLATE 100 MG
100 TABLET ORAL 2 TIMES DAILY
Qty: 14 TABLET | Refills: 0 | Status: SHIPPED | OUTPATIENT
Start: 2018-01-15 | End: 2018-05-04

## 2018-01-15 RX ORDER — METHYLPREDNISOLONE ACETATE 80 MG/ML
80 INJECTION, SUSPENSION INTRA-ARTICULAR; INTRALESIONAL; INTRAMUSCULAR; SOFT TISSUE ONCE
Status: COMPLETED | OUTPATIENT
Start: 2018-01-15 | End: 2018-01-15

## 2018-01-15 RX ORDER — TRIAMCINOLONE ACETONIDE 40 MG/ML
40 INJECTION, SUSPENSION INTRA-ARTICULAR; INTRAMUSCULAR
Status: COMPLETED | OUTPATIENT
Start: 2018-01-15 | End: 2018-01-15

## 2018-01-15 RX ADMIN — TRIAMCINOLONE ACETONIDE 40 MG: 40 INJECTION, SUSPENSION INTRA-ARTICULAR; INTRAMUSCULAR at 03:01

## 2018-01-15 RX ADMIN — METHYLPREDNISOLONE ACETATE 80 MG: 80 INJECTION, SUSPENSION INTRA-ARTICULAR; INTRALESIONAL; INTRAMUSCULAR; SOFT TISSUE at 03:01

## 2018-01-15 NOTE — PROGRESS NOTES
Subjective:       Patient ID: Ashely Holman is a 83 y.o. female.    Chief Complaint: Follow-up; Cough (chest congestion for the last 3 weeks); and Fever (low grade)    Cough and nasal D/c - yellow for 3 weeks.    Always sneezes after she eats.      Cough   This is a recurrent problem. The current episode started 1 to 4 weeks ago. The problem has been unchanged. The cough is productive of sputum. Associated symptoms include a fever and rhinorrhea. Pertinent negatives include no chest pain or shortness of breath.   Fever    This is a new problem. The current episode started in the past 7 days. The problem has been resolved. The maximum temperature noted was 100 to 100.9 F. Associated symptoms include congestion and coughing. Pertinent negatives include no chest pain.   Diabetes   She presents for her follow-up diabetic visit. She has type 2 diabetes mellitus. Her disease course has been stable. Pertinent negatives for diabetes include no chest pain.   Hypertension   This is a chronic problem. The current episode started more than 1 year ago. The problem is controlled. Pertinent negatives include no chest pain or shortness of breath.     Review of Systems   Constitutional: Positive for fever.   HENT: Positive for congestion, rhinorrhea and sinus pressure.    Respiratory: Positive for cough. Negative for shortness of breath.    Cardiovascular: Negative for chest pain.   Musculoskeletal: Positive for back pain.   Psychiatric/Behavioral: Negative for dysphoric mood.       Objective:      Physical Exam   Constitutional: She appears well-developed and well-nourished.   HENT:   Head: Normocephalic.   Eyes: Pupils are equal, round, and reactive to light.   Cardiovascular: Normal rate, regular rhythm and normal heart sounds.  Exam reveals no friction rub.    Pulmonary/Chest: Effort normal. She has wheezes.   Mild exp wheezes.   Neurological: She is alert.       Assessment:       1. Controlled type 2 diabetes mellitus with  stage 3 chronic kidney disease, without long-term current use of insulin    2. Essential hypertension    3. Acquired hypothyroidism    4. Type 2 diabetes mellitus with complication, without long-term current use of insulin    5. Routine history and physical examination of adult    6. CKD (chronic kidney disease) stage 3, GFR 30-59 ml/min    7. Iron deficiency anemia, unspecified iron deficiency anemia type    8. Mild intermittent asthma without complication    9. Fever, unspecified fever cause    10. Cough    11. Sinusitis, unspecified chronicity, unspecified location        Plan:       Per orders and D/C instructions.  Continue meds/diet for DM, HTN, hypothyroid, CKD, Asthma, and anemia, which are stable.  Steroid shot and Doxy for sinusitis and cough.    Screening: The patient was screened for depression with the PHQ2 questionnaire and possible health consequences were discussed with the patient, who understands (15 minutes spent). The patient was screened for the misuse of alcohol, by asking the number of drinks per average week, and if pt has had more than 4 drinks (more than 3 for women and elderly) in 1 day within the past year. The health and legal consequences of misuse were discussed (15 minutes spent). The patient was screened for obesity (BMI>30), If the current BMI > 30, then the possible consequences of obesity, as well as the benefits of diet, exercise, and weight loss were discussed (15 minutes spent).

## 2018-03-19 LAB
25(OH)D3+25(OH)D2 SERPL-MCNC: 39 NG/ML (ref 30–100)
ALBUMIN SERPL-MCNC: 3.7 G/DL (ref 3.6–5.1)
ALBUMIN/GLOB SERPL: 1.2 (CALC) (ref 1–2.5)
ALMOND IGE QN: <0.1 KU/L
ALP SERPL-CCNC: 62 U/L (ref 33–130)
ALT SERPL-CCNC: 7 U/L (ref 6–29)
AST SERPL-CCNC: 12 U/L (ref 10–35)
BASOPHILS # BLD AUTO: 59 CELLS/UL (ref 0–200)
BASOPHILS NFR BLD AUTO: 1 %
BILIRUB SERPL-MCNC: 0.3 MG/DL (ref 0.2–1.2)
BUN SERPL-MCNC: 25 MG/DL (ref 7–25)
BUN/CREAT SERPL: 19 (CALC) (ref 6–22)
CALCIUM SERPL-MCNC: 9.5 MG/DL (ref 8.6–10.4)
CASHEW NUT IGE QN: <0.1 KU/L
CHLORIDE SERPL-SCNC: 107 MMOL/L (ref 98–110)
CHOLEST SERPL-MCNC: 166 MG/DL
CHOLEST/HDLC SERPL: 2.8 (CALC)
CO2 SERPL-SCNC: 22 MMOL/L (ref 20–31)
CODFISH IGE QN: <0.1 KU/L
CREAT SERPL-MCNC: 1.35 MG/DL (ref 0.6–0.88)
DEPRECATED ALMOND IGE RAST QL: 0
DEPRECATED CASHEW NUT IGE RAST QL: 0
DEPRECATED CODFISH IGE RAST QL: 0
DEPRECATED EGG WHITE IGE RAST QL: ABNORMAL
DEPRECATED HAZELNUT IGE RAST QL: 0
DEPRECATED MILK IGE RAST QL: 0
DEPRECATED PEANUT IGE RAST QL: 0
DEPRECATED SALMON IGE RAST QL: 0
DEPRECATED SCALLOP IGE RAST QL: 0
DEPRECATED SESAME SEED IGE RAST QL: 0
DEPRECATED SHRIMP IGE RAST QL: 0
DEPRECATED SOYBEAN IGE RAST QL: 0
DEPRECATED TUNA IGE RAST QL: 0
DEPRECATED WALNUT IGE RAST QL: 0
DEPRECATED WHEAT IGE RAST QL: 0
EGG WHITE IGE QN: 0.13 KU/L
EOSINOPHIL # BLD AUTO: 301 CELLS/UL (ref 15–500)
EOSINOPHIL NFR BLD AUTO: 5.1 %
ERYTHROCYTE [DISTWIDTH] IN BLOOD BY AUTOMATED COUNT: 15.2 % (ref 11–15)
GFR SERPL CREATININE-BSD FRML MDRD: 36 ML/MIN/1.73M2
GLOBULIN SER CALC-MCNC: 3 G/DL (CALC) (ref 1.9–3.7)
GLUCOSE SERPL-MCNC: 179 MG/DL (ref 65–99)
HAZELNUT IGE QN: <0.1 KU/L
HBA1C MFR BLD: 5.3 % OF TOTAL HGB
HCT VFR BLD AUTO: 32.4 % (ref 35–45)
HDLC SERPL-MCNC: 60 MG/DL
HGB BLD-MCNC: 9.9 G/DL (ref 11.7–15.5)
IRON SATN MFR SERPL: 12 % (CALC) (ref 11–50)
IRON SERPL-MCNC: 44 MCG/DL (ref 45–160)
LDLC SERPL CALC-MCNC: 83 MG/DL (CALC)
LYMPHOCYTES # BLD AUTO: 1687 CELLS/UL (ref 850–3900)
LYMPHOCYTES NFR BLD AUTO: 28.6 %
MCH RBC QN AUTO: 26.3 PG (ref 27–33)
MCHC RBC AUTO-ENTMCNC: 30.6 G/DL (ref 32–36)
MCV RBC AUTO: 85.9 FL (ref 80–100)
MILK IGE QN: <0.1 KU/L
MONOCYTES # BLD AUTO: 490 CELLS/UL (ref 200–950)
MONOCYTES NFR BLD AUTO: 8.3 %
NEUTROPHILS # BLD AUTO: 3363 CELLS/UL (ref 1500–7800)
NEUTROPHILS NFR BLD AUTO: 57 %
NONHDLC SERPL-MCNC: 106 MG/DL (CALC)
PEANUT IGE QN: <0.1 KU/L
PLATELET # BLD AUTO: 147 THOUSAND/UL (ref 140–400)
PMV BLD REES-ECKER: 10.9 FL (ref 7.5–12.5)
POTASSIUM SERPL-SCNC: 4.1 MMOL/L (ref 3.5–5.3)
PROT SERPL-MCNC: 6.7 G/DL (ref 6.1–8.1)
RBC # BLD AUTO: 3.77 MILLION/UL (ref 3.8–5.1)
REF LAB TEST REF RANGE: ABNORMAL
SALMON IGE QN: <0.1 KU/L
SCALLOP IGE QN: <0.1 KU/L
SESAME SEED IGE QN: <0.1 KU/L
SHRIMP IGE QN: <0.1 KU/L
SODIUM SERPL-SCNC: 142 MMOL/L (ref 135–146)
SOYBEAN IGE QN: <0.1 KU/L
T4 FREE SERPL-MCNC: 1.4 NG/DL (ref 0.8–1.8)
TIBC SERPL-MCNC: 360 MCG/DL (CALC) (ref 250–450)
TRIGL SERPL-MCNC: 131 MG/DL
TSH SERPL-ACNC: 0.57 MIU/L (ref 0.4–4.5)
TUNA IGE QN: <0.1 KU/L
VIT B12 SERPL-MCNC: 458 PG/ML (ref 200–1100)
VITAMIN D2 SERPL-MCNC: <4 NG/ML
VITAMIN D3 SERPL-MCNC: 39 NG/ML
WALNUT IGE QN: <0.1 KU/L
WBC # BLD AUTO: 5.9 THOUSAND/UL (ref 3.8–10.8)
WHEAT IGE QN: <0.1 KU/L

## 2018-05-04 ENCOUNTER — HOSPITAL ENCOUNTER (EMERGENCY)
Facility: OTHER | Age: 83
Discharge: HOME | End: 2018-05-04
Attending: EMERGENCY MEDICINE
Payer: MEDICARE

## 2018-05-04 VITALS
DIASTOLIC BLOOD PRESSURE: 84 MMHG | SYSTOLIC BLOOD PRESSURE: 183 MMHG | WEIGHT: 133 LBS | OXYGEN SATURATION: 98 % | RESPIRATION RATE: 20 BRPM | BODY MASS INDEX: 26.11 KG/M2 | HEART RATE: 62 BPM | HEIGHT: 60 IN | TEMPERATURE: 98 F

## 2018-05-04 DIAGNOSIS — R47.1 DYSARTHRIA: Primary | ICD-10-CM

## 2018-05-04 DIAGNOSIS — R06.02 SOB (SHORTNESS OF BREATH): ICD-10-CM

## 2018-05-04 LAB
ALBUMIN SERPL BCP-MCNC: 3.7 G/DL
ALP SERPL-CCNC: 82 U/L
ALT SERPL W/O P-5'-P-CCNC: 10 U/L
ANION GAP SERPL CALC-SCNC: 13 MMOL/L
APTT BLDCRRT: 27.2 SEC
AST SERPL-CCNC: 25 U/L
BASOPHILS # BLD AUTO: 0.04 K/UL
BASOPHILS NFR BLD: 0.5 %
BILIRUB SERPL-MCNC: 0.2 MG/DL
BILIRUB UR QL STRIP: NEGATIVE
BUN SERPL-MCNC: 24 MG/DL
CALCIUM SERPL-MCNC: 10.1 MG/DL
CHLORIDE SERPL-SCNC: 110 MMOL/L
CLARITY UR: CLEAR
CO2 SERPL-SCNC: 17 MMOL/L
COLOR UR: YELLOW
CREAT SERPL-MCNC: 1.3 MG/DL
DIFFERENTIAL METHOD: ABNORMAL
EOSINOPHIL # BLD AUTO: 0.2 K/UL
EOSINOPHIL NFR BLD: 2.2 %
ERYTHROCYTE [DISTWIDTH] IN BLOOD BY AUTOMATED COUNT: 16.2 %
EST. GFR  (AFRICAN AMERICAN): 44 ML/MIN/1.73 M^2
EST. GFR  (NON AFRICAN AMERICAN): 38 ML/MIN/1.73 M^2
GLUCOSE SERPL-MCNC: 110 MG/DL
GLUCOSE UR QL STRIP: NEGATIVE
HCT VFR BLD AUTO: 37.5 %
HGB BLD-MCNC: 11.8 G/DL
HGB UR QL STRIP: NEGATIVE
INR PPP: 0.9
KETONES UR QL STRIP: NEGATIVE
LEUKOCYTE ESTERASE UR QL STRIP: NEGATIVE
LYMPHOCYTES # BLD AUTO: 1.3 K/UL
LYMPHOCYTES NFR BLD: 16.8 %
MCH RBC QN AUTO: 28.5 PG
MCHC RBC AUTO-ENTMCNC: 31.5 G/DL
MCV RBC AUTO: 91 FL
MONOCYTES # BLD AUTO: 0.6 K/UL
MONOCYTES NFR BLD: 7.6 %
NEUTROPHILS # BLD AUTO: 5.6 K/UL
NEUTROPHILS NFR BLD: 72.6 %
NITRITE UR QL STRIP: NEGATIVE
PH UR STRIP: 5 [PH] (ref 5–8)
PLATELET # BLD AUTO: 129 K/UL
PMV BLD AUTO: 11.1 FL
POTASSIUM SERPL-SCNC: 4.8 MMOL/L
PROT SERPL-MCNC: 7.5 G/DL
PROT UR QL STRIP: NEGATIVE
PROTHROMBIN TIME: 10.2 SEC
RBC # BLD AUTO: 4.14 M/UL
SODIUM SERPL-SCNC: 140 MMOL/L
SP GR UR STRIP: 1.02 (ref 1–1.03)
URN SPEC COLLECT METH UR: NORMAL
UROBILINOGEN UR STRIP-ACNC: NEGATIVE EU/DL
WBC # BLD AUTO: 7.66 K/UL

## 2018-05-04 PROCEDURE — 99284 EMERGENCY DEPT VISIT MOD MDM: CPT | Mod: 25

## 2018-05-04 PROCEDURE — 81003 URINALYSIS AUTO W/O SCOPE: CPT

## 2018-05-04 PROCEDURE — 85730 THROMBOPLASTIN TIME PARTIAL: CPT

## 2018-05-04 PROCEDURE — 80053 COMPREHEN METABOLIC PANEL: CPT

## 2018-05-04 PROCEDURE — 93010 ELECTROCARDIOGRAM REPORT: CPT | Mod: ,,, | Performed by: INTERNAL MEDICINE

## 2018-05-04 PROCEDURE — 85025 COMPLETE CBC W/AUTO DIFF WBC: CPT

## 2018-05-04 PROCEDURE — 85610 PROTHROMBIN TIME: CPT

## 2018-05-04 PROCEDURE — 93005 ELECTROCARDIOGRAM TRACING: CPT

## 2018-05-04 RX ORDER — LORATADINE 10 MG/1
10 TABLET ORAL DAILY
COMMUNITY
End: 2023-01-24

## 2018-05-05 NOTE — ED TRIAGE NOTES
"Pt presents to ED with daughter who lives with pt, c/o coughing that has progressively gotten worse since Saturday, unrelieved by home nebulizer treatments, frequent dry cough nonproductive for phlegm, SOB reported, intermittent dizziness. Daughter reports mental status is close to baseline, reports pt is sometimes "out of it due to medications and stuff". PMHx of TIA, HTN, DM, COPD, hypothyroidism. Pt is AAOx3, disoriented to the year but able to state the month.   "

## 2018-05-05 NOTE — ED PROVIDER NOTES
"Encounter Date: 5/4/2018    SCRIBE #1 NOTE: I, Tiffany Johnston, am scribing for, and in the presence of, Dr. Chinchilla.       History     Chief Complaint   Patient presents with    Altered Mental Status     BIB daughter for a complaint of altered mental status. Daughter noticed it Wednesday night. Patient able to ambulate with walker (which is her norm). Patient currently oriented     Time seen by provider: 7:15 PM    This is a 84 y.o. female who was brought in by her daughter for concern of speech difficulty which started two nights ago. Her daughter states that she began to notice the patient's speech was slurred and slower, though only occurring intermittently. She states that "it's like she gets stuck and she can't get the words out." She also says that "her voice is not normal" and "her speech is off." Daughter denies any new confusion more than occasional disorientation at baseline. She denies noticeable facial droop. Patient denies fever, chills, nausea, vomiting, chest pain, shortness of breath, abdominal pain, or difficulty moving her extremities. She reports that the patient was last seen at baseline behavior the night prior to onset of speech difficulty. Daughter reports a history of COPD. She states that the patient uses a home nebulizer and albuterol. She also notes a history of previous UTIs.  Daughter states that patient has never used tobacco. History is translated through daughter. Patient is Estonian-speaking.      The history is provided by the patient and a relative.     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 Garcia.    DM2 (diabetes " mellitus, type 2) 2/25/2014    Encounter for blood transfusion     Hypertension     Iron deficiency anemia 2/25/2014    Dr. 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 Ocasio    Post herpetic neuralgia 2/25/2014    Left thigh Dr. Huang    Solitary lung nodule 4/20/2014    Right LL - 4 mm. CT 2007, 2008. CXR 4/14.    Stroke     2015    Thyroid disease      Past Surgical History:   Procedure Laterality Date    CATARACT EXTRACTION W/  INTRAOCULAR LENS IMPLANT  2013    left eye    CHOLECYSTECTOMY  09/2016    right thyroidectomy  2004     Family History   Problem Relation Age of Onset    Hypertension Mother     Arthritis Father     Hypertension Father     Stroke Father     Hypertension Sister     Alcohol abuse Brother     Cancer Brother         colon    Hypertension Brother     Asthma Daughter     Hypertension Daughter     Arthritis Maternal Aunt     Asthma Paternal Grandmother      Social History   Substance Use Topics    Smoking status: Never Smoker    Smokeless tobacco: Never Used    Alcohol use No     Review of Systems   Constitutional: Negative for chills and fever.   HENT: Negative for sore throat.    Respiratory: Positive for cough. Negative for shortness of breath.    Cardiovascular: Negative for chest pain.   Gastrointestinal: Negative for abdominal pain, constipation, diarrhea, nausea and vomiting.   Genitourinary: Negative for dysuria.   Musculoskeletal: Negative for back pain.   Skin: Negative for rash.   Neurological: Positive for speech difficulty (per daughter). Negative for dizziness, facial asymmetry, weakness, light-headedness and numbness.   Hematological: Does not bruise/bleed easily.       Physical Exam     Initial Vitals [05/04/18 1800]   BP Pulse Resp Temp SpO2   (!) 186/88 79 -- 97.8 °F (36.6 °C) 100 %      MAP       120.67         Physical Exam    Nursing note and vitals reviewed.  Constitutional: She  appears well-developed and well-nourished. She is not diaphoretic. No distress.   HENT:   Head: Normocephalic and atraumatic.   Mouth/Throat: Oropharynx is clear and moist.   Eyes: EOM are normal. Pupils are equal, round, and reactive to light. Right eye exhibits no discharge. Left eye exhibits no discharge.   Neck: Normal range of motion. Neck supple.   Cardiovascular: Normal rate, regular rhythm and normal heart sounds.   Pulmonary/Chest: Breath sounds normal. No respiratory distress. She has no wheezes. She has no rhonchi. She has no rales.   Abdominal: Soft. Bowel sounds are normal. She exhibits no distension. There is no tenderness. There is no rebound and no guarding.   Musculoskeletal: Normal range of motion. She exhibits no edema or tenderness.   Neurological: She is alert and oriented to person, place, and time. She has normal strength. No cranial nerve deficit or sensory deficit.   Normal finger-to-nose bilaterally. Moving all extremities.  Ambulatory with steady gait. Sensation intact.  No facial asymmetry. No dysarthria.  No aphasia   Skin: Skin is warm and dry. Capillary refill takes less than 2 seconds. No rash and no abscess noted. No erythema. No pallor.   Psychiatric: She has a normal mood and affect. Her behavior is normal. Judgment and thought content normal.         ED Course   Procedures  Labs Reviewed   COMPREHENSIVE METABOLIC PANEL - Abnormal; Notable for the following:        Result Value    CO2 17 (*)     BUN, Bld 24 (*)     eGFR if  44 (*)     eGFR if non  38 (*)     All other components within normal limits   CBC W/ AUTO DIFFERENTIAL - Abnormal; Notable for the following:     Hemoglobin 11.8 (*)     MCHC 31.5 (*)     RDW 16.2 (*)     Platelets 129 (*)     Lymph% 16.8 (*)     All other components within normal limits   URINALYSIS   PROTIME-INR   APTT     Imaging Results          CT Head Without Contrast (Final result)  Result time 05/04/18 19:56:32    Final  result by Negrito Castillo MD (05/04/18 19:56:32)                 Impression:      No CT evidence of large territorial infarction.  Consider MRI of the brain for follow-up.    Changes of chronic small vessel ischemic disease and cerebral volume loss.      Electronically signed by: Negrito Castillo MD  Date:    05/04/2018  Time:    19:56             Narrative:    EXAMINATION:  CT HEAD WITHOUT CONTRAST    CLINICAL HISTORY:  Focal neuro deficit, new, fixed or worsening, >6 hours;    TECHNIQUE:  Low dose axial images were obtained through the head.  Coronal and sagittal reformations were also performed. Contrast was not administered.    COMPARISON:  MRI of the brain dated 11/30/2015 and CT scan of the head dated 10/18/2015    FINDINGS:  The subcutaneous tissues are within normal limits.  The bony calvarium is intact.  The paranasal sinuses and mastoid air cells are clear.  There are postoperative changes in the left orbit.  The paranasal sinuses and right mastoid air cells are clear.  There is small amount of fluid within the left mastoid air cells.    The craniocervical junction is within normal limits.  The midline structures appear unremarkable.  There are no extra-axial fluid collections.  There is no evidence of intracranial hemorrhage.  The ventricles and sulci are prominent, consistent with cerebral volume loss.  There are scattered hypodensities within the periventricular and subcortical white matter.  There are old lacunar type infarcts in the bilateral basal ganglia.  There are marked calcifications of the skull base vessels.  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  There is no evidence of a dense vessel sign.  There is no evidence of mass effect.                                EKG Readings: (Independently Interpreted)   Initial Reading: No STEMI.   19:18- Normal sinus rhythm at a rate of 72 bpm. Normal axis. Normal intervals. No ST or ischemic changes.          Medical Decision Making:   History:   Old  Medical Records: I decided to obtain old medical records.  Old Records Summarized: other records and records from clinic visits.  Initial Assessment:   7:15PM:  Pt is a 83 y/o F who presents to ED with concerns for dysarthria and slurred speech. Pt appears well, nontoxic.  On exam, I do not appreciate any dysarthria, though pt is Filipino speaking and ability to discern speech may be limited.  Pt otherwise is neurologically intact. Will plan for labs, imaging, will continue to follow and reassess.    Independently Interpreted Test(s):   I have ordered and independently interpreted X-rays - see prior notes.  I have ordered and independently interpreted EKG Reading(s) - see prior notes  Clinical Tests:   Lab Tests: Ordered and Reviewed  Radiological Study: Ordered and Reviewed  Medical Tests: Ordered and Reviewed    9:47 PM: Pt doing well, remains stable. Her labs and CT showed no acute findings.  I discussed with family and daughter that while her CT showed no obvious stroke/TIA, would be difficult to full evaluate without admitting her for full work up with MRI.  Pt would like to go home instead of getting an MRI, though they do understand that I am unable to completely r/o TIA. Her neuro status has not changed.  They do have a f/u appointment with her PCP soon and daughter will call her PCP on Monday.  I updated pt and family regarding her results.  I discussed with them ED return warnings and counseled pt regarding supportive care measures.  I have discussed with the pt the need for close PCP f/u.  Pt agreeable to plan and all questions answered.  I feel that pt is stable for discharge and management as an outpatient and no further intervention is needed at this time.  Pt is comfortable returning to the ED if needed.  Will DC home in stable condition.                Scribe Attestation:   Scribe #1: I performed the above scribed service and the documentation accurately describes the services I performed. I attest to  the accuracy of the note.    Attending Attestation:           Physician Attestation for Scribe:  Physician Attestation Statement for Scribe #1: I, Dr. Chinchilla, reviewed documentation, as scribed by Tiffany Johnston in my presence, and it is both accurate and complete.                    Clinical Impression:     1. Dysarthria    2. SOB (shortness of breath)                               Lizbeth Chinchilla MD  05/04/18 [id number]

## 2018-05-05 NOTE — ED NOTES
Pt remains connected to cardiac monitor, continuous pulse ox, BP cycling. Bed locked and in lowest position, side rails x 2, call light in reach. Pt is in NAD, SpO2 94% on RA. WCTM

## 2018-05-05 NOTE — ED NOTES
"Completed hourly rounding on pt. Pt resting in bed, awake and alert, calm, cooperative, responding appropriately to questions, GCS 15, daughter at bedside, pt in NAD, respirations 20 even and unlabored with equal bilateral chest expansion, pt asks "When can I go home?" Pt and family updated on POC - awaiting results of blood lab work.respirations Addressed pt needs. Pt denies need to use the bathroom at this time. Pt remains connected to cardiac monitor and continuous pulse ox, BP cycling. Bed locked and in low position, side rails x 2, call light in reach. WCTM.    "

## 2018-05-21 ENCOUNTER — OFFICE VISIT (OUTPATIENT)
Dept: INTERNAL MEDICINE | Facility: CLINIC | Age: 83
End: 2018-05-21
Attending: INTERNAL MEDICINE
Payer: MEDICARE

## 2018-05-21 VITALS
DIASTOLIC BLOOD PRESSURE: 80 MMHG | HEART RATE: 90 BPM | SYSTOLIC BLOOD PRESSURE: 120 MMHG | WEIGHT: 133 LBS | OXYGEN SATURATION: 98 % | BODY MASS INDEX: 26.11 KG/M2 | HEIGHT: 60 IN

## 2018-05-21 DIAGNOSIS — I10 ESSENTIAL HYPERTENSION: ICD-10-CM

## 2018-05-21 DIAGNOSIS — R13.13 PHARYNGEAL DYSPHAGIA: ICD-10-CM

## 2018-05-21 DIAGNOSIS — E03.9 ACQUIRED HYPOTHYROIDISM: ICD-10-CM

## 2018-05-21 DIAGNOSIS — J45.20 MILD INTERMITTENT ASTHMA WITHOUT COMPLICATION: ICD-10-CM

## 2018-05-21 DIAGNOSIS — D69.6 THROMBOCYTOPENIA: ICD-10-CM

## 2018-05-21 DIAGNOSIS — E11.8 TYPE 2 DIABETES MELLITUS WITH COMPLICATION, WITHOUT LONG-TERM CURRENT USE OF INSULIN: Primary | ICD-10-CM

## 2018-05-21 PROCEDURE — 96372 THER/PROPH/DIAG INJ SC/IM: CPT | Mod: S$GLB,,, | Performed by: INTERNAL MEDICINE

## 2018-05-21 PROCEDURE — 99214 OFFICE O/P EST MOD 30 MIN: CPT | Mod: 25,S$GLB,, | Performed by: INTERNAL MEDICINE

## 2018-05-21 PROCEDURE — 3074F SYST BP LT 130 MM HG: CPT | Mod: CPTII,S$GLB,, | Performed by: INTERNAL MEDICINE

## 2018-05-21 PROCEDURE — 3079F DIAST BP 80-89 MM HG: CPT | Mod: CPTII,S$GLB,, | Performed by: INTERNAL MEDICINE

## 2018-05-21 RX ORDER — TRIAMCINOLONE ACETONIDE 40 MG/ML
40 INJECTION, SUSPENSION INTRA-ARTICULAR; INTRAMUSCULAR
Status: COMPLETED | OUTPATIENT
Start: 2018-05-21 | End: 2018-05-21

## 2018-05-21 RX ORDER — CODEINE PHOSPHATE AND GUAIFENESIN 10; 100 MG/5ML; MG/5ML
5 SOLUTION ORAL NIGHTLY PRN
Qty: 236 ML | Refills: 0 | Status: SHIPPED | OUTPATIENT
Start: 2018-05-21 | End: 2018-05-31

## 2018-05-21 RX ORDER — METHYLPREDNISOLONE ACETATE 80 MG/ML
80 INJECTION, SUSPENSION INTRA-ARTICULAR; INTRALESIONAL; INTRAMUSCULAR; SOFT TISSUE ONCE
Status: COMPLETED | OUTPATIENT
Start: 2018-05-21 | End: 2018-05-21

## 2018-05-21 RX ORDER — TRAZODONE HYDROCHLORIDE 50 MG/1
TABLET ORAL
Qty: 30 TABLET | Refills: 11
Start: 2018-05-21 | End: 2018-09-30

## 2018-05-21 RX ADMIN — TRIAMCINOLONE ACETONIDE 40 MG: 40 INJECTION, SUSPENSION INTRA-ARTICULAR; INTRAMUSCULAR at 04:05

## 2018-05-21 RX ADMIN — METHYLPREDNISOLONE ACETATE 80 MG: 80 INJECTION, SUSPENSION INTRA-ARTICULAR; INTRALESIONAL; INTRAMUSCULAR; SOFT TISSUE at 04:05

## 2018-05-21 NOTE — PROGRESS NOTES
Subjective:       Patient ID: Ashely Holman is a 84 y.o. female.    Chief Complaint: Follow-up (See ER note in Epic); Weight Loss (not eating); Oral Swelling (says she feels like something is stuck in her throat); and Cough (1+month)    Chronic cough. Throat feels swollen. Voice different.  Hard to get out of bed in the morning.      Cough   This is a chronic problem. The current episode started more than 1 month ago. The problem has been unchanged. Pertinent negatives include no chest pain or shortness of breath.   Diabetes   She presents for her follow-up diabetic visit. She has type 2 diabetes mellitus. Pertinent negatives for diabetes include no chest pain.   Hypertension   This is a chronic problem. The current episode started more than 1 year ago. The problem is controlled. Pertinent negatives include no chest pain or shortness of breath.     Review of Systems   Constitutional: Negative.    HENT: Positive for congestion and trouble swallowing.    Respiratory: Positive for cough. Negative for shortness of breath.    Cardiovascular: Negative for chest pain.       Objective:      Physical Exam   Constitutional: She appears well-developed and well-nourished.   HENT:   Head: Normocephalic.   Eyes: Pupils are equal, round, and reactive to light.   Cardiovascular: Normal rate, regular rhythm and normal heart sounds.  Exam reveals no friction rub.    Pulmonary/Chest: Effort normal. She has wheezes. She has rhonchi.   Mild exp wheezes.   Neurological: She is alert.       Assessment:       1. Type 2 diabetes mellitus with complication, without long-term current use of insulin    2. Thrombocytopenia    3. Pharyngeal dysphagia    4. Acquired hypothyroidism    5. Essential hypertension    6. Mild intermittent asthma without complication        Plan:       Per orders and D/C instructions.  Continue meds/diet for DM, HTN, hypothyroid, and thrombocytopenia, which are stable.  Steroid shot and Robo AC at night for cough  and asthma.   Cut back or stop Trazodone.        Statement Selected

## 2018-07-01 ENCOUNTER — OFFICE VISIT (OUTPATIENT)
Dept: URGENT CARE | Facility: CLINIC | Age: 83
End: 2018-07-01
Payer: MEDICARE

## 2018-07-01 VITALS
RESPIRATION RATE: 16 BRPM | TEMPERATURE: 97 F | HEIGHT: 60 IN | HEART RATE: 89 BPM | SYSTOLIC BLOOD PRESSURE: 146 MMHG | BODY MASS INDEX: 26.11 KG/M2 | WEIGHT: 133 LBS | OXYGEN SATURATION: 97 % | DIASTOLIC BLOOD PRESSURE: 92 MMHG

## 2018-07-01 DIAGNOSIS — J44.1 COPD WITH ACUTE EXACERBATION: Primary | ICD-10-CM

## 2018-07-01 DIAGNOSIS — R05.3 PERSISTENT COUGH FOR 3 WEEKS OR LONGER: ICD-10-CM

## 2018-07-01 PROCEDURE — 3077F SYST BP >= 140 MM HG: CPT | Mod: CPTII,S$GLB,, | Performed by: EMERGENCY MEDICINE

## 2018-07-01 PROCEDURE — 3080F DIAST BP >= 90 MM HG: CPT | Mod: CPTII,S$GLB,, | Performed by: EMERGENCY MEDICINE

## 2018-07-01 PROCEDURE — 99214 OFFICE O/P EST MOD 30 MIN: CPT | Mod: S$GLB,,, | Performed by: EMERGENCY MEDICINE

## 2018-07-01 RX ORDER — DOXYCYCLINE 100 MG/1
100 CAPSULE ORAL 2 TIMES DAILY
Qty: 20 CAPSULE | Refills: 0 | Status: SHIPPED | OUTPATIENT
Start: 2018-07-01 | End: 2018-07-11

## 2018-07-01 RX ORDER — PREDNISONE 20 MG/1
TABLET ORAL
Qty: 10 TABLET | Refills: 0 | Status: SHIPPED | OUTPATIENT
Start: 2018-07-01 | End: 2018-08-21

## 2018-07-01 RX ORDER — CODEINE PHOSPHATE AND GUAIFENESIN 10; 100 MG/5ML; MG/5ML
10 SOLUTION ORAL EVERY 8 HOURS PRN
Qty: 150 ML | Refills: 0 | Status: SHIPPED | OUTPATIENT
Start: 2018-07-01 | End: 2018-07-06

## 2018-07-01 RX ORDER — TIMOLOL MALEATE 5 MG/ML
SOLUTION/ DROPS OPHTHALMIC
COMMUNITY
Start: 2018-06-18

## 2018-07-01 NOTE — PATIENT INSTRUCTIONS
"See cough sheet  See copd flare sheet  Chest xray shows no pneumonia however    SEE "COPD FLARE SHEET" PRINTED IN [place]  DOXYCYCLINE RX  PREDNISONE RX  USE ALBUTEROL NEBULIZER  USE PROVENTIL INHALER  CHERATUSSIN AC (GUAFENESIN WITH CODEINE) RX FOR COUGH AT NIGHT OR WHEN SEVERE.  OK TO TAKE MUCINEX DM TWICE DAILY  FOR MILD/MODERATE COUGH    XRAY RESULTS COPIED FOR YOU AND PRESENT TO DR CORTÉS AND CONSIDER CT SCAN OF THE CHEST TO FURTHER EVALUATE THE NODULES OF THE CHEST.    Brote de EPOC    Ha tenido un brote de mortensen EPOC.  La EPOC, o enfermedad pulmonar obstructiva crónica, es angela enfermedad común de los pulmones. Hace que smooth vías respiratorias se irriten y se estrechen. Por eso, le resulta más difícil respirar. El enfisema y la bronquitis crónica son dos tipos de EPOC. Es angela enfermedad crónica, lo que significa que la tendrá todo el tiempo. En ocasiones, se pone peor. Cuando eso sucede, es un "brote".  Síntomas de la EPOC  Las personas que tienen EPOC pueden tener síntomas todo el tiempo. Cuando tienen un brote, smooth síntomas empeoran. Los siguientes síntomas pueden significar que tiene un brote:  · Falta de aire; respiración rápida o superficial, o silbidos que empeoran  · Infección pulmonar  · Tos que empeora  · Más mucosidad, mucosidad más espesa o de diferente color  · Cansancio, menos energía o dificultades para hacer smooth actividades habituales  · Fiebre  · Sensación de opresión en el pecho  · Smooth síntomas no mejoran, ni siquiera cuando usa el nebulizador, los inhaladores o smooth medicamentos habituales  · Dificultades para hablar  · Se siente confundido  ¿Por qué se produce un brote?  Lamentablemente, un brote puede suceder aunque usted haya hecho todo alva y haya seguido las instrucciones de mortensen médico. Los siguientes son algunos de los motivos por los que se producen los brotes:  · Fumar o aspirar humo de segunda mano  · Resfriados, gripe o infecciones respiratorias  · Contaminación del aire  · Cambio " repentino del clima  · Polvo, sustancias químicas irritantes o vapores isael  · No lisa smooth medicamentos maliha shreyas le indicaron  Cuidados en la casa  Aquí tiene algunas cosas que puede hacer en mortensen casa para tratar un brote:  · Intente no desesperarse. Porque eso hará que le resulte más difícil respirar e impedirá que aby lo correcto.  · No fume ni esté cerca de otras personas que están fumando.  · Intente beber más líquidos que lo habitual cuando tenga un brote. Aby esto a menos que mortensen médico le haya dicho que no lo aby porque tiene problemas con el corazón o los riñones. Beber más líquidos puede ayudar a aflojar la mucosidad.  · Use smooth inhaladores y mortensen nebulizador (si tiene mony), maliha shreyas le hayan indicado.  · Si le dieron antibióticos, tómelos todos o hasta que mortensen médico le diga que deje de tomarlos. Es importante que termine todos los antibióticos, incluso aunque se sienta mejor. Eso asegurará que la infección desaparezca.  · Si le dieron prednisona u otro esteroide, termínelo aunque se sienta mejor.  Cómo prevenir un brote  Aunque los brotes suceden, la mejor manera de tratarlo es evitar que comience. Aquí tiene algunos consejos:  · No fume ni esté cerca de otras personas que están fumando.  · [place] smooth medicamentos maliha shreyas le indicaron.  · Hable con mortensen médico acerca de aplicarse la vacuna anual contra la gripe (flu shot) todos los años. También pregunte si necesita angela vacuna contra la neumonía.  · Si hay advertencia de mal clima, intente quedarse adentro en lo posible.  · Intente comer de manera saludable y dormir mucho.  · Intente evitar las cosas que normalmente le provocan brotes. Por ejemplo, el polvo, los vapores químicos, el espray para el jacky o los perfumes isael.  Visita de control  Programe angela visita de control con mortensen proveedor de atención médica.  Si le hicieron un cultivo, le dirán si es necesario cambiarle el tratamiento. Puede llamar según le indiquen para conocer los resultados.  Si  le hicieron radiografías, le dirán si hay algún cambio en los resultados que pueda afectar mortensen tratamiento.  Llame al 911  Llame al 911 si ocurre algo de lo siguiente:  · Tiene problemas para respirar  · Se siente confundido o es difícil despertarlo  · Se desmaya o queda inconsciente  · Tiene frecuencia cardíaca rápida  · Tiene un dolor nuevo en el pecho, el brazo, el hombro, el maria o la parte superior de la espalda  ¿Cuándo debe buscar atención médica?  Llame a mortensen proveedor de atención médica de inmediato si ocurre cualquiera de las siguientes situaciones:  · Empeora mortensen silbido o mortensen falta de aire  · Necesita usar inhaladores con más frecuencia que lo habitual y no lo alivian  · Tiene fiebre de 100.4 °F (38.3 ºC) o más, o según le haya indicado mortensen proveedor de atención médica  · Al toser, despide mucha mucosidad de color oscuro o con joseph  · Le duele el pecho con cada respiración  · No comienza a sentirse mejor en 24 horas  · La hinchazón en smooth tobillos empeora  · Tiene mareos o debilidad  Date Last Reviewed: 12/19/2016  © 8109-1035 [employer]. [address] [place] [id number]. Todos los derechos reservados. Esta información no pretende sustituir la atención médica profesional. Sólo mortensen médico puede diagnosticar y tratar un problema de lio.        Tos, Crónica [Cough, Uncertain Cause, Adult]    Todos hemos tenido tos shreyas parte de un resfriado, angela gripe o angela bronquitis. Esta clase de tos se presenta junto con sensación de dolor, fiebre ligera, congestión nasal, congestión de los senos paranasales e irritación o dolor de garganta. La situación mejora en dos o dione semanas. Angela tos que persiste más de dione semanas suele tener otras causas.  En función del examen de norberto, la causa exacta de mortensen tos es incierta. A continuación se presentan algunas de las causas más comunes de la tos persistente. Si la tos no mejora julian las próximas dos semanas, es posible que sea necesario realizar más  "exámenes. Cumpla el seguimiento con mortensen médico shreyas se le indique.  Tos Del Fumador  La tos del fumador no desaparece. Si continúa fumando, empeorará. La tos se debe a la irritación de las vías respiratorias. Hable con mortensen médico acerca de dejar de fumar. Los parches, chicles, inhaladores, aerosoles nasales con nicotina u otros métodos pueden hacerlo más sencillo.  Goteo Posnasal  La tos que empeora por las noches puede deberse al "goteo posnasal". El exceso de moco en la nariz drena desde la parte posterior de la nariz hacia la garganta y provoca el reflejo de toser. Es probable que se deba a angela infección de senos paranasales o angela alergia. Las causas de alergias comunes incluyen: polvo, humo, moho, polen, mascotas, artículos de limpieza, desodorantes ambientales y gases químicos. Los antihistamínicos o los descongestivos de venta sin receta pueden resultar útiles para las alergias. Moreno se necesita tratamiento con antibióticos para angela infección de senos paranasales. Visite a mortensen médico si los síntomas continúan.  Medicamentos  Ciertos medicamentos de venta con receta pueden provocar tos crónica en algunas personas:  · Inhibidores de la ECA para la hipertensión (estos incluyen: Lotensin (benazepril), Capoten (captopril), Vasotec (enalapril), Monopril (fosinopril), Prinivil y Zestril (lisinopril), Accupril (quinapril), Altace (ramipril), entre otros)  · Beta bloqueadores para hipertensión y otros trastornos (estos incluyen: Inderal (propranolol), Tenormin (atenolol), Lopressor (metoprolol), Corgard (nadolol), entre otros)  Informe a mortensen médico si sung alguno de estos medicamentos.  Asma  La tos puede ser el único signo del asma leve. Mortensen médico puede realizar exámenes de pulmón para averiguar si el asma es la causa de la tos. Mortensen respuesta a angela prueba de medicamentos contra el asma también puede ayudar a realizar un diagnóstico.  Reflujo Ácido (Acidez)  El esófago es el conducto que lleva los alimentos desde la boca " hasta el estómago. En mortensen extremo inferior hay angela válvula que kilo que los ácidos del estómago fluyan hacia arriba. Si esta válvula no funciona alva, el ácido del estómago ingresa en el esófago. [place] puede provocar un dolor intenso en la parte superior del abdomen o la parte baja del pecho, eructos, o tos. Los síntomas suelen empeorar cuando se recuesta. Evite comer o beber antes de irse a dormir. Pruebe usar más almohadas para elevar la parte superior del cuerpo o coloque bloques de 10 cm (4 pulgadas) debajo de la cabecera de la cama. Puede probar los antiácidos de venta sin receta (Tums, Mylanta) o un medicamento que bloquee el ácido (Pepcid AC, Tagamet HB, Zantac 75 o Prilosec OTC). Mortensen médico puede recetarle medicamentos más isael para kathryn trastorno.  Seguimiento  con mortensen médico shreyas se le indique si la tos no mejora julian las próximas dos semanas. Es posible que sea necesario realizar más exámenes.  [NOTA: Si se tomó angela radiografía otro especialista la revisará. Se le notificará cualquier nuevo resultado que pueda afectar mortensen atención.]  Busque Prontamente Atención Médica  si algo de lo siguiente ocurre:  · Sibilancias o dificultad para respirar  · Fiebre de 100.4°F (38°C) o más mickie, o shreyas le haya indicado mortensen proveedor de atención médica  · Pérdida inesperada de peso  · Tos con gran cantidad de esputo con color  · Tos con joseph  · Sudoración nocturna (empapar las sábanas y el pijama)  Date Last Reviewed: 9/13/2015  © 3120-4748 [employer]. [address] [place] [address], [place] [address]. Todos los derechos reservados. Esta información no pretende sustituir la atención médica profesional. Sólo mortensen médico puede diagnosticar y tratar un problema de lio.

## 2018-07-01 NOTE — PROGRESS NOTES
Subjective:       Patient ID: Ashely Holman is a 84 y.o. female.    Vitals:    07/01/18 1317   BP: (!) 146/92   Pulse: 89   Resp: 16   Temp: 97 °F (36.1 °C)   SpO2: 97%   Weight: 60.3 kg (133 lb)   Height: 5' (1.524 m)       Chief Complaint: Cough    Pt.'s daughter states pt has COPD. Pt.'s daughter states pt has a cough x 3 months. Pt.'s states pt is complaining of right sided harley pain from coughing.PATIENT HAS BEEN INTERMITTENTLY COUGHING FOR 2-3 MONTHS WITH DIFFERENT DEGREES OF INTENSITY AND DIFFERENT DEGREES OF WHEEZING. REPORTS RIGHT SIDED CHEST WALL PAIN FROM COUGHING SO MUCH LATELY. NO FEVER, NO CHILLS, NO HEMOPTYSIS, NO SOB, NO CALF PAIN, NO LEG PAIN.      Cough   This is a chronic problem. The current episode started more than 1 month ago. The problem has been unchanged. The cough is non-productive. Pertinent negatives include no chest pain, chills, ear pain, eye redness, fever, headaches, myalgias, sore throat, shortness of breath or wheezing. The symptoms are aggravated by lying down. She has tried prescription cough suppressant (steroid, nebulizer RX, ) for the symptoms. The treatment provided mild relief. Her past medical history is significant for COPD.     Review of Systems   Constitution: Negative for chills, fever and malaise/fatigue.   HENT: Negative for congestion, ear pain, hoarse voice and sore throat.    Eyes: Negative for discharge and redness.   Cardiovascular: Negative for chest pain, dyspnea on exertion and leg swelling.   Respiratory: Positive for cough. Negative for shortness of breath, sputum production and wheezing.    Musculoskeletal: Negative for myalgias.   Gastrointestinal: Negative for abdominal pain and nausea.   Neurological: Negative for headaches.       Objective:      Physical Exam   Constitutional: She is oriented to person, place, and time. She appears well-developed and well-nourished. She is cooperative.  Non-toxic appearance. She does not appear ill. No distress.    HENT:   Head: Normocephalic and atraumatic.   Right Ear: Hearing, tympanic membrane, external ear and ear canal normal.   Left Ear: Hearing, tympanic membrane, external ear and ear canal normal.   Nose: Nose normal. No mucosal edema, rhinorrhea or nasal deformity. No epistaxis. Right sinus exhibits no maxillary sinus tenderness and no frontal sinus tenderness. Left sinus exhibits no maxillary sinus tenderness and no frontal sinus tenderness.   Mouth/Throat: Uvula is midline, oropharynx is clear and moist and mucous membranes are normal. No trismus in the jaw. Normal dentition. No uvula swelling. No posterior oropharyngeal erythema.   Eyes: Conjunctivae and lids are normal. No scleral icterus.   Sclera clear bilat   Neck: Trachea normal, normal range of motion, full passive range of motion without pain and phonation normal. Neck supple.   Cardiovascular: Normal rate, regular rhythm, normal heart sounds, intact distal pulses and normal pulses.    Pulmonary/Chest: Effort normal. No respiratory distress. She has wheezes. She has no rales. She exhibits no tenderness.   INTERMITTENT COUGHING, FAINT EXP WHEEZE HEARD ON FORCED EXPIRATION   Abdominal: Soft. Normal appearance and bowel sounds are normal. She exhibits no distension. There is no tenderness.   Musculoskeletal: Normal range of motion. She exhibits no edema or deformity.   Neurological: She is alert and oriented to person, place, and time. She exhibits normal muscle tone. Coordination normal.   Skin: Skin is warm, dry and intact. She is not diaphoretic. No pallor.   Psychiatric: She has a normal mood and affect. Her speech is normal and behavior is normal. Judgment and thought content normal. Cognition and memory are normal.   Nursing note and vitals reviewed.        X-ray Chest Pa And Lateral    Result Date: 7/1/2018  EXAMINATION: XR CHEST PA AND LATERAL CLINICAL HISTORY: COUGH; Chronic obstructive pulmonary disease with (acute) exacerbation TECHNIQUE: PA and  "lateral views of the chest were performed. COMPARISON: 10/01/2016, 10/02/2016 FINDINGS: The cardiomediastinal silhouette is prominent, similar to the previous exam, noting calcification of the aorta..  There is no pleural effusion.  The trachea is midline.  The lungs are symmetrically expanded bilaterally with coarse interstitial attenuation and bilateral basilar subsegmental atelectasis, similar in appearance to the previous exam.  There may be subtle nodular foci projected over the lateral aspect of the left upper lung zone..  No large focal consolidation seen.  There are several scattered nodular foci projected over the hemithoraces bilaterally.  There is no pneumothorax.  The osseous structures are remarkable for degenerative change..     1. Coarse interstitial attenuation bilaterally, grossly similar to the previous exam.  There are multiple nodular foci throughout the hemithoraces bilaterally, noting similar appearance on CT 09/28/2016.  Clinical correlation recommended.  No large focal consolidation. Electronically signed by: Dilan Hines MD Date:    07/01/2018 Time:    14:08      Assessment:       1. COPD with acute exacerbation    2. Persistent cough for 3 weeks or longer        Plan:       [facility] was seen today for cough.    Diagnoses and all orders for this visit:    COPD with acute exacerbation  -     X-Ray Chest PA And Lateral; Future    Persistent cough for 3 weeks or longer    Other orders  -     doxycycline (VIBRAMYCIN) 100 MG Cap; Take 1 capsule (100 mg total) by mouth 2 (two) times daily. for 10 days  -     guaifenesin-codeine 100-10 mg/5 ml (TUSSI-ORGANIDIN NR)  mg/5 mL syrup; Take 10 mLs by mouth every 8 (eight) hours as needed for Cough (FOR SEVERE COUGH WHEN NOT DRIVING OR AT NIGHT).  -     predniSONE (DELTASONE) 20 MG tablet; Take 2 tablets daily for 5 days          Patient Instructions   See cough sheet  See copd flare sheet  Chest xray shows no pneumonia however    SEE "COPD " "FLARE SHEET" PRINTED IN Faroese  DOXYCYCLINE RX  PREDNISONE RX  USE ALBUTEROL NEBULIZER  USE PROVENTIL INHALER  CHERATUSSIN AC (GUAFENESIN WITH CODEINE) RX FOR COUGH AT NIGHT OR WHEN SEVERE.  OK TO TAKE MUCINEX DM TWICE DAILY  FOR MILD/MODERATE COUGH    XRAY RESULTS COPIED FOR YOU AND PRESENT TO DR CORTÉS AND CONSIDER CT SCAN OF THE CHEST TO FURTHER EVALUATE THE NODULES OF THE CHEST.    Brote de EPOC    Ha tenido un brote de mortensen EPOC.  La EPOC, o enfermedad pulmonar obstructiva crónica, es angela enfermedad común de los pulmones. Hace que regina vías respiratorias se irriten y se estrechen. Por eso, le resulta más difícil respirar. El enfisema y la bronquitis crónica son dos tipos de EPOC. Es angela enfermedad crónica, lo que significa que la tendrá todo el tiempo. En ocasiones, se pone peor. Cuando eso sucede, es un "brote".  Síntomas de la EPOC  Las personas que tienen EPOC pueden tener síntomas todo el tiempo. Cuando tienen un brote, regina síntomas empeoran. Los siguientes síntomas pueden significar que tiene un brote:  · Falta de aire; respiración rápida o superficial, o silbidos que empeoran  · Infección pulmonar  · Tos que empeora  · Más mucosidad, mucosidad más espesa o de diferente color  · Cansancio, menos energía o dificultades para hacer regina actividades habituales  · Fiebre  · Sensación de opresión en el pecho  · Regina síntomas no mejoran, ni siquiera cuando usa el nebulizador, los inhaladores o regina medicamentos habituales  · Dificultades para hablar  · Se siente confundido  ¿Por qué se produce un brote?  Lamentablemente, un brote puede suceder aunque usted haya hecho todo alva y haya seguido las instrucciones de mortensen médico. Los siguientes son algunos de los motivos por los que se producen los brotes:  · Fumar o aspirar humo de segunda mano  · Resfriados, gripe o infecciones respiratorias  · Contaminación del aire  · Cambio repentino del clima  · Polvo, sustancias químicas irritantes o vapores isael  · No lisa regina " medicamentos maliha shreyas le indicaron  Cuidados en la casa  Aquí tiene algunas cosas que puede hacer en mortensen casa para tratar un brote:  · Intente no desesperarse. Porque eso hará que le resulte más difícil respirar e impedirá que aby lo correcto.  · No fume ni esté cerca de otras personas que están fumando.  · Intente beber más líquidos que lo habitual cuando tenga un brote. Aby esto a menos que mortensen médico le haya dicho que no lo aby porque tiene problemas con el corazón o los riñones. Beber más líquidos puede ayudar a aflojar la mucosidad.  · Use smooth inhaladores y mortensen nebulizador (si tiene mony), maliha shreyas le hayan indicado.  · Si le dieron antibióticos, tómelos todos o hasta que mortensen médico le diga que deje de tomarlos. Es importante que termine todos los antibióticos, incluso aunque se sienta mejor. Eso asegurará que la infección desaparezca.  · Si le dieron prednisona u otro esteroide, termínelo aunque se sienta mejor.  Cómo prevenir un brote  Aunque los brotes suceden, la mejor manera de tratarlo es evitar que comience. Aquí tiene algunos consejos:  · No fume ni esté cerca de otras personas que están fumando.  · Rex smooth medicamentos maliha shreyas le indicaron.  · Hable con mortensen médico acerca de aplicarse la vacuna anual contra la gripe (flu shot) todos los años. También pregunte si necesita angela vacuna contra la neumonía.  · Si hay advertencia de mal clima, intente quedarse adentro en lo posible.  · Intente comer de manera saludable y dormir mucho.  · Intente evitar las cosas que normalmente le provocan brotes. Por ejemplo, el polvo, los vapores químicos, el espray para el jacky o los perfumes isael.  Visita de control  Programe angela visita de control con mortensen proveedor de atención médica.  Si le hicieron un cultivo, le dirán si es necesario cambiarle el tratamiento. Puede llamar según le indiquen para conocer los resultados.  Si le hicieron radiografías, le dirán si hay algún cambio en los resultados que pueda afectar mortensen  tratamiento.  Llame al 911  Llame al 911 si ocurre algo de lo siguiente:  · Tiene problemas para respirar  · Se siente confundido o es difícil despertarlo  · Se desmaya o queda inconsciente  · Tiene frecuencia cardíaca rápida  · Tiene un dolor nuevo en el pecho, el brazo, el hombro, el maria o la parte superior de la espalda  ¿Cuándo debe buscar atención médica?  Llame a mortensen proveedor de atención médica de inmediato si ocurre cualquiera de las siguientes situaciones:  · Empeora mortensen silbido o mortensen falta de aire  · Necesita usar inhaladores con más frecuencia que lo habitual y no lo alivian  · Tiene fiebre de 100.4 °F (38.3 ºC) o más, o según le haya indicado mortensen proveedor de atención médica  · Al toser, despide mucha mucosidad de color oscuro o con joseph  · Le duele el pecho con cada respiración  · No comienza a sentirse mejor en 24 horas  · La hinchazón en smooth tobillos empeora  · Tiene mareos o debilidad  Date Last Reviewed: 12/19/2016  © 8593-0211 [employer] [address] [place] [id number]. Todos los derechos reservados. Esta información no pretende sustituir la atención médica profesional. Sólo mortensen médico puede diagnosticar y tratar un problema de lio.        Tos, Crónica [Cough, Uncertain Cause, Adult]    Todos hemos tenido tos shreyas parte de un resfriado, angela gripe o angela bronquitis. Esta clase de tos se presenta junto con sensación de dolor, fiebre ligera, congestión nasal, congestión de los senos paranasales e irritación o dolor de garganta. La situación mejora en dos o dione semanas. Angela tos que persiste más de dione semanas suele tener otras causas.  En función del examen de norberto, la causa exacta de mortensen tos es incierta. A continuación se presentan algunas de las causas más comunes de la tos persistente. Si la tos no mejora julian las próximas dos semanas, es posible que sea necesario realizar más exámenes. Cumpla el seguimiento con mortensen médico shreyas se le indique.  Tos Del Fumador  La tos  "del fumador no desaparece. Si continúa fumando, empeorará. La tos se debe a la irritación de las vías respiratorias. Hable con mortensen médico acerca de dejar de fumar. Los parches, chicles, inhaladores, aerosoles nasales con nicotina u otros métodos pueden hacerlo más sencillo.  Goteo Posnasal  La tos que empeora por las noches puede deberse al "goteo posnasal". El exceso de moco en la nariz drena desde la parte posterior de la nariz hacia la garganta y provoca el reflejo de toser. Es probable que se deba a angela infección de senos paranasales o angela alergia. Las causas de alergias comunes incluyen: polvo, humo, moho, polen, mascotas, artículos de limpieza, desodorantes ambientales y gases químicos. Los antihistamínicos o los descongestivos de venta sin receta pueden resultar útiles para las alergias. Moreno se necesita tratamiento con antibióticos para angela infección de senos paranasales. Visite a mortensen médico si los síntomas continúan.  Medicamentos  Ciertos medicamentos de venta con receta pueden provocar tos crónica en algunas personas:  · Inhibidores de la ECA para la hipertensión (estos incluyen: Lotensin (benazepril), Capoten (captopril), Vasotec (enalapril), Monopril (fosinopril), Prinivil y Zestril (lisinopril), Accupril (quinapril), Altace (ramipril), entre otros)  · Beta bloqueadores para hipertensión y otros trastornos (estos incluyen: Inderal (propranolol), Tenormin (atenolol), Lopressor (metoprolol), Corgard (nadolol), entre otros)  Informe a mortensen médico si sung alguno de estos medicamentos.  Asma  La tos puede ser el único signo del asma leve. Mortensen médico puede realizar exámenes de pulmón para averiguar si el asma es la causa de la tos. Mortensen respuesta a angela prueba de medicamentos contra el asma también puede ayudar a realizar un diagnóstico.  Reflujo Ácido (Acidez)  El esófago es el conducto que lleva los alimentos desde la boca hasta el estómago. En mortensen extremo inferior hay angela válvula que kilo que los ácidos del " estómago fluyan hacia arriba. Si esta válvula no funciona alva, el ácido del estómago ingresa en el esófago. Cavalero puede provocar un dolor intenso en la parte superior del abdomen o la parte baja del pecho, eructos, o tos. Los síntomas suelen empeorar cuando se recuesta. Evite comer o beber antes de irse a dormir. Pruebe usar más almohadas para elevar la parte superior del cuerpo o coloque bloques de 10 cm (4 pulgadas) debajo de la cabecera de la cama. Puede probar los antiácidos de venta sin receta (Tums, Mylanta) o un medicamento que bloquee el ácido (Pepcid AC, Tagamet HB, Zantac 75 o Prilosec OTC). Mortensen médico puede recetarle medicamentos más isael para kathryn trastorno.  Seguimiento  con mortensen médico shreyas se le indique si la tos no mejora julian las próximas dos semanas. Es posible que sea necesario realizar más exámenes.  [NOTA: Si se tomó angela radiografía otro especialista la revisará. Se le notificará cualquier nuevo resultado que pueda afectar mortensen atención.]  Busque Prontamente Atención Médica  si algo de lo siguiente ocurre:  · Sibilancias o dificultad para respirar  · Fiebre de 100.4°F (38°C) o más mickie, o shreyas le haya indicado mortensen proveedor de atención médica  · Pérdida inesperada de peso  · Tos con gran cantidad de esputo con color  · Tos con joseph  · Sudoración nocturna (empapar las sábanas y el pijama)  Date Last Reviewed: 9/13/2015  © 2324-6742 [employer]. [address] [place] [address], [place] [address]. Todos los derechos reservados. Esta información no pretende sustituir la atención médica profesional. Sólo mortensen médico puede diagnosticar y tratar un problema de lio.

## 2018-08-21 ENCOUNTER — OFFICE VISIT (OUTPATIENT)
Dept: INTERNAL MEDICINE | Facility: CLINIC | Age: 83
End: 2018-08-21
Attending: INTERNAL MEDICINE
Payer: MEDICARE

## 2018-08-21 VITALS
HEIGHT: 60 IN | HEART RATE: 89 BPM | BODY MASS INDEX: 26.5 KG/M2 | OXYGEN SATURATION: 88 % | WEIGHT: 135 LBS | DIASTOLIC BLOOD PRESSURE: 64 MMHG | SYSTOLIC BLOOD PRESSURE: 98 MMHG

## 2018-08-21 DIAGNOSIS — R91.1 SOLITARY LUNG NODULE: ICD-10-CM

## 2018-08-21 DIAGNOSIS — R05.9 COUGH: ICD-10-CM

## 2018-08-21 DIAGNOSIS — E11.22 TYPE 2 DIABETES MELLITUS WITH STAGE 3 CHRONIC KIDNEY DISEASE, WITHOUT LONG-TERM CURRENT USE OF INSULIN: ICD-10-CM

## 2018-08-21 DIAGNOSIS — D50.9 IRON DEFICIENCY ANEMIA, UNSPECIFIED IRON DEFICIENCY ANEMIA TYPE: Primary | ICD-10-CM

## 2018-08-21 DIAGNOSIS — E03.9 ACQUIRED HYPOTHYROIDISM: ICD-10-CM

## 2018-08-21 DIAGNOSIS — N25.81 HYPERPARATHYROIDISM DUE TO RENAL INSUFFICIENCY: ICD-10-CM

## 2018-08-21 DIAGNOSIS — M54.42 LEFT-SIDED LOW BACK PAIN WITH LEFT-SIDED SCIATICA, UNSPECIFIED CHRONICITY: ICD-10-CM

## 2018-08-21 DIAGNOSIS — N18.30 TYPE 2 DIABETES MELLITUS WITH STAGE 3 CHRONIC KIDNEY DISEASE, WITHOUT LONG-TERM CURRENT USE OF INSULIN: ICD-10-CM

## 2018-08-21 DIAGNOSIS — I10 ESSENTIAL HYPERTENSION: ICD-10-CM

## 2018-08-21 DIAGNOSIS — R07.82 INTERCOSTAL PAIN: ICD-10-CM

## 2018-08-21 DIAGNOSIS — E11.8 TYPE 2 DIABETES MELLITUS WITH COMPLICATION, WITHOUT LONG-TERM CURRENT USE OF INSULIN: ICD-10-CM

## 2018-08-21 DIAGNOSIS — J45.20 MILD INTERMITTENT ASTHMA WITHOUT COMPLICATION: ICD-10-CM

## 2018-08-21 DIAGNOSIS — N18.30 CKD (CHRONIC KIDNEY DISEASE) STAGE 3, GFR 30-59 ML/MIN: ICD-10-CM

## 2018-08-21 PROCEDURE — 3074F SYST BP LT 130 MM HG: CPT | Mod: CPTII,S$GLB,, | Performed by: INTERNAL MEDICINE

## 2018-08-21 PROCEDURE — 99215 OFFICE O/P EST HI 40 MIN: CPT | Mod: S$GLB,,, | Performed by: INTERNAL MEDICINE

## 2018-08-21 PROCEDURE — 3078F DIAST BP <80 MM HG: CPT | Mod: CPTII,S$GLB,, | Performed by: INTERNAL MEDICINE

## 2018-08-21 RX ORDER — CODEINE PHOSPHATE AND GUAIFENESIN 10; 100 MG/5ML; MG/5ML
5 SOLUTION ORAL EVERY 6 HOURS PRN
Qty: 236 ML | Refills: 1 | Status: SHIPPED | OUTPATIENT
Start: 2018-08-21 | End: 2018-08-31

## 2018-08-21 NOTE — PROGRESS NOTES
Subjective:       Patient ID: Ashely Holman is a 84 y.o. female.    Chief Complaint: Follow-up (3 month); Flank Pain (right side / does not want to get out of bed); and Cough (deep / dry)    Right flank hurts.  Cough productive of green sputum.      Flank Pain   This is a new problem. The current episode started 1 to 4 weeks ago. The problem occurs constantly. Pertinent negatives include no chest pain.   Cough   This is a recurrent problem. The current episode started 1 to 4 weeks ago. The problem has been gradually worsening. Pertinent negatives include no chest pain or shortness of breath.   Hypertension   This is a chronic problem. The current episode started more than 1 year ago. The problem is controlled. Pertinent negatives include no chest pain or shortness of breath.   Diabetes   She presents for her follow-up diabetic visit. She has type 2 diabetes mellitus. Her disease course has been stable. Pertinent negatives for diabetes include no chest pain.     Review of Systems   Constitutional: Negative.    Respiratory: Positive for cough. Negative for shortness of breath.    Cardiovascular: Negative for chest pain.   Genitourinary: Positive for flank pain.       Objective:      Physical Exam   Constitutional: She appears well-developed and well-nourished.   HENT:   Head: Normocephalic.   Eyes: Pupils are equal, round, and reactive to light.   Cardiovascular: Normal rate, regular rhythm and normal heart sounds. Exam reveals no friction rub.   Pulmonary/Chest: Effort normal. She has rhonchi in the right lower field and the left lower field.   Mild exp wheezes.   Neurological: She is alert.   Skin:        Tender intercostal muscles.       Assessment:       1. Iron deficiency anemia, unspecified iron deficiency anemia type    2. Acquired hypothyroidism    3. Solitary lung nodule    4. Left-sided low back pain with left-sided sciatica, unspecified chronicity    5. Type 2 diabetes mellitus with complication,  without long-term current use of insulin    6. Essential hypertension    7. CKD (chronic kidney disease) stage 3, GFR 30-59 ml/min    8. Type 2 diabetes mellitus with stage 3 chronic kidney disease, without long-term current use of insulin    9. Mild intermittent asthma without complication    10. Hyperparathyroidism due to renal insufficiency    11. Cough    12. Intercostal pain        Plan:       Per orders and D/C instructions.  Continue meds/diet for DM, HTN, anemia, hypothyroid, LBP, CKD with hyperpara, and high cholest. which are stable.     Change to Jonathangy and Jerrica AC for Asthma, cough, and intercostal pain.

## 2018-11-15 ENCOUNTER — OFFICE VISIT (OUTPATIENT)
Dept: INTERNAL MEDICINE | Facility: CLINIC | Age: 83
End: 2018-11-15
Attending: INTERNAL MEDICINE
Payer: MEDICARE

## 2018-11-15 VITALS
HEART RATE: 79 BPM | OXYGEN SATURATION: 97 % | BODY MASS INDEX: 25 KG/M2 | DIASTOLIC BLOOD PRESSURE: 68 MMHG | SYSTOLIC BLOOD PRESSURE: 100 MMHG | WEIGHT: 128 LBS

## 2018-11-15 DIAGNOSIS — N18.30 TYPE 2 DIABETES MELLITUS WITH STAGE 3 CHRONIC KIDNEY DISEASE, WITHOUT LONG-TERM CURRENT USE OF INSULIN: ICD-10-CM

## 2018-11-15 DIAGNOSIS — E78.9 DISORDER OF LIPID METABOLISM: ICD-10-CM

## 2018-11-15 DIAGNOSIS — J45.20 MILD INTERMITTENT ASTHMA WITHOUT COMPLICATION: ICD-10-CM

## 2018-11-15 DIAGNOSIS — R05.9 COUGH: ICD-10-CM

## 2018-11-15 DIAGNOSIS — D51.0 PERNICIOUS ANEMIA: ICD-10-CM

## 2018-11-15 DIAGNOSIS — E03.9 ACQUIRED HYPOTHYROIDISM: ICD-10-CM

## 2018-11-15 DIAGNOSIS — E11.22 TYPE 2 DIABETES MELLITUS WITH STAGE 3 CHRONIC KIDNEY DISEASE, WITHOUT LONG-TERM CURRENT USE OF INSULIN: ICD-10-CM

## 2018-11-15 DIAGNOSIS — R79.89 OTHER SPECIFIED ABNORMAL FINDINGS OF BLOOD CHEMISTRY: ICD-10-CM

## 2018-11-15 DIAGNOSIS — E11.8 TYPE 2 DIABETES MELLITUS WITH COMPLICATION, WITHOUT LONG-TERM CURRENT USE OF INSULIN: ICD-10-CM

## 2018-11-15 DIAGNOSIS — E55.9 VITAMIN D DEFICIENCY: ICD-10-CM

## 2018-11-15 DIAGNOSIS — E03.9 HYPOTHYROIDISM, UNSPECIFIED TYPE: ICD-10-CM

## 2018-11-15 DIAGNOSIS — D50.9 IRON DEFICIENCY ANEMIA, UNSPECIFIED IRON DEFICIENCY ANEMIA TYPE: Primary | ICD-10-CM

## 2018-11-15 DIAGNOSIS — D50.8 OTHER IRON DEFICIENCY ANEMIA: ICD-10-CM

## 2018-11-15 PROCEDURE — 96372 THER/PROPH/DIAG INJ SC/IM: CPT | Mod: S$GLB,,, | Performed by: INTERNAL MEDICINE

## 2018-11-15 PROCEDURE — 3074F SYST BP LT 130 MM HG: CPT | Mod: CPTII,S$GLB,, | Performed by: INTERNAL MEDICINE

## 2018-11-15 PROCEDURE — 99214 OFFICE O/P EST MOD 30 MIN: CPT | Mod: 25,S$GLB,, | Performed by: INTERNAL MEDICINE

## 2018-11-15 PROCEDURE — 3078F DIAST BP <80 MM HG: CPT | Mod: CPTII,S$GLB,, | Performed by: INTERNAL MEDICINE

## 2018-11-15 RX ORDER — DOXYCYCLINE HYCLATE 100 MG
100 TABLET ORAL 2 TIMES DAILY
Qty: 14 TABLET | Refills: 0 | Status: SHIPPED | OUTPATIENT
Start: 2018-11-15 | End: 2019-06-12

## 2018-11-15 RX ORDER — METHYLPREDNISOLONE ACETATE 80 MG/ML
80 INJECTION, SUSPENSION INTRA-ARTICULAR; INTRALESIONAL; INTRAMUSCULAR; SOFT TISSUE ONCE
Status: COMPLETED | OUTPATIENT
Start: 2018-11-15 | End: 2018-11-15

## 2018-11-15 RX ORDER — TRIAMCINOLONE ACETONIDE 40 MG/ML
40 INJECTION, SUSPENSION INTRA-ARTICULAR; INTRAMUSCULAR ONCE
Status: COMPLETED | OUTPATIENT
Start: 2018-11-15 | End: 2018-11-15

## 2018-11-15 RX ADMIN — METHYLPREDNISOLONE ACETATE 80 MG: 80 INJECTION, SUSPENSION INTRA-ARTICULAR; INTRALESIONAL; INTRAMUSCULAR; SOFT TISSUE at 03:11

## 2018-11-15 RX ADMIN — TRIAMCINOLONE ACETONIDE 40 MG: 40 INJECTION, SUSPENSION INTRA-ARTICULAR; INTRAMUSCULAR at 03:11

## 2018-11-15 NOTE — PROGRESS NOTES
Subjective:       Patient ID: Ashely Holman is a 84 y.o. female.    Chief Complaint: Follow-up; Anorexia; Knee Pain; and Generalized Body Aches    Didn't et much while daughter was out of town. Lost 7 lb.  Cough - great.      Hypertension   This is a chronic problem. The current episode started more than 1 year ago. The problem is controlled. Pertinent negatives include no chest pain or shortness of breath.   Diabetes   She presents for her follow-up diabetic visit. She has type 2 diabetes mellitus. Her disease course has been stable. Pertinent negatives for diabetes include no chest pain.     Review of Systems   Constitutional: Negative.    Respiratory: Positive for cough. Negative for shortness of breath.    Cardiovascular: Negative for chest pain.       Objective:      Physical Exam   Constitutional: She appears well-developed and well-nourished.   HENT:   Head: Normocephalic.   Eyes: Pupils are equal, round, and reactive to light.   Cardiovascular: Normal rate, regular rhythm and normal heart sounds. Exam reveals no friction rub.   Pulmonary/Chest: Effort normal. She has rhonchi in the right lower field and the left lower field.   Neurological: She is alert.       Assessment:       1. Iron deficiency anemia, unspecified iron deficiency anemia type    2. Acquired hypothyroidism    3. Type 2 diabetes mellitus with complication, without long-term current use of insulin    4. Type 2 diabetes mellitus with stage 3 chronic kidney disease, without long-term current use of insulin    5. Mild intermittent asthma without complication    6. Cough        Plan:       Per orders and D/C instructions.  Continue meds/diet for DM, HTN, anemia, and hypothyroid, which are stable.     Steroid shot and Doxy for asthma exac.  Check labs.

## 2018-12-06 ENCOUNTER — OFFICE VISIT (OUTPATIENT)
Dept: URGENT CARE | Facility: CLINIC | Age: 83
End: 2018-12-06
Payer: MEDICARE

## 2018-12-06 VITALS
HEIGHT: 60 IN | WEIGHT: 128 LBS | DIASTOLIC BLOOD PRESSURE: 85 MMHG | SYSTOLIC BLOOD PRESSURE: 141 MMHG | TEMPERATURE: 98 F | RESPIRATION RATE: 18 BRPM | OXYGEN SATURATION: 96 % | HEART RATE: 100 BPM | BODY MASS INDEX: 25.13 KG/M2

## 2018-12-06 DIAGNOSIS — J44.1 COPD WITH EXACERBATION: ICD-10-CM

## 2018-12-06 DIAGNOSIS — R06.02 SOB (SHORTNESS OF BREATH): ICD-10-CM

## 2018-12-06 DIAGNOSIS — R68.83 CHILLS: Primary | ICD-10-CM

## 2018-12-06 DIAGNOSIS — J01.00 ACUTE NON-RECURRENT MAXILLARY SINUSITIS: ICD-10-CM

## 2018-12-06 LAB
CTP QC/QA: YES
FLUAV AG NPH QL: NEGATIVE
FLUBV AG NPH QL: NEGATIVE

## 2018-12-06 PROCEDURE — 3077F SYST BP >= 140 MM HG: CPT | Mod: CPTII,S$GLB,, | Performed by: NURSE PRACTITIONER

## 2018-12-06 PROCEDURE — 71046 X-RAY EXAM CHEST 2 VIEWS: CPT | Mod: S$GLB,,, | Performed by: RADIOLOGY

## 2018-12-06 PROCEDURE — 99214 OFFICE O/P EST MOD 30 MIN: CPT | Mod: S$GLB,,, | Performed by: NURSE PRACTITIONER

## 2018-12-06 PROCEDURE — 3079F DIAST BP 80-89 MM HG: CPT | Mod: CPTII,S$GLB,, | Performed by: NURSE PRACTITIONER

## 2018-12-06 PROCEDURE — 87804 INFLUENZA ASSAY W/OPTIC: CPT | Mod: QW,S$GLB,, | Performed by: NURSE PRACTITIONER

## 2018-12-06 RX ORDER — BENZONATATE 100 MG/1
200 CAPSULE ORAL 3 TIMES DAILY PRN
Qty: 30 CAPSULE | Refills: 0 | Status: SHIPPED | OUTPATIENT
Start: 2018-12-06 | End: 2019-10-17

## 2018-12-06 RX ORDER — CODEINE PHOSPHATE AND GUAIFENESIN 10; 100 MG/5ML; MG/5ML
5 SOLUTION ORAL EVERY 8 HOURS PRN
Qty: 120 ML | Refills: 0 | Status: SHIPPED | OUTPATIENT
Start: 2018-12-06 | End: 2018-12-16

## 2018-12-06 RX ORDER — DOXYCYCLINE 100 MG/1
100 CAPSULE ORAL 2 TIMES DAILY
Qty: 14 CAPSULE | Refills: 0 | Status: SHIPPED | OUTPATIENT
Start: 2018-12-06 | End: 2018-12-13

## 2018-12-06 RX ORDER — PREDNISONE 20 MG/1
40 TABLET ORAL DAILY
Qty: 10 TABLET | Refills: 0 | Status: SHIPPED | OUTPATIENT
Start: 2018-12-06 | End: 2018-12-11

## 2018-12-06 RX ORDER — ALBUTEROL SULFATE 90 UG/1
2 AEROSOL, METERED RESPIRATORY (INHALATION) EVERY 4 HOURS PRN
Qty: 18 G | Refills: 0 | Status: SHIPPED | OUTPATIENT
Start: 2018-12-06 | End: 2021-02-01

## 2018-12-07 NOTE — PROGRESS NOTES
Subjective:       Patient ID: Ashely Holman is a 84 y.o. female.    Vitals:    12/06/18 1838   BP: (!) 141/85   Pulse: 100   Resp: 18   Temp: 98.3 °F (36.8 °C)   SpO2: 96%   Weight: 58.1 kg (128 lb)   Height: 5' (1.524 m)       Chief Complaint: Cough (3 days ) and Otalgia (both )    Patient has a hx of COPD. Has been coughing on and off for 3 months and was seen last by PCP on 11/15- given steroid shot and doxycycline. She was feeling better until 3 days ago. Her daughter presents in office today who is also sick with asthma flare up.   Her cough is dry and hacking at times. Denies fever,  body aches. Reports feeling chills and flu like.   + SOB- worse than usual, after coughing spells. Denies BIRD. Patient is also c./o nasal/head congestion and ear pain.   Denies back pain or chest pain         Cough   This is a new problem. The current episode started in the past 7 days. The problem has been gradually worsening. The cough is productive of sputum. Associated symptoms include ear pain. Pertinent negatives include no chest pain, chills, eye redness, fever, headaches, myalgias, sore throat, shortness of breath or wheezing. The symptoms are aggravated by lying down (at night ). She has tried nothing for the symptoms. Her past medical history is significant for COPD. There is no history of asthma, bronchitis or pneumonia.     Review of Systems   Constitution: Negative for chills, fever and malaise/fatigue.   HENT: Positive for congestion and ear pain. Negative for hoarse voice and sore throat.    Eyes: Negative for discharge and redness.   Cardiovascular: Negative for chest pain, dyspnea on exertion and leg swelling.   Respiratory: Positive for cough and sputum production. Negative for shortness of breath and wheezing.    Musculoskeletal: Negative for myalgias.   Gastrointestinal: Negative for abdominal pain and nausea.   Neurological: Negative for headaches.       Objective:      Physical Exam   Constitutional: She  is oriented to person, place, and time. She appears well-developed and well-nourished. She is cooperative.  Non-toxic appearance. She does not appear ill. No distress.   NAD   Afebrile  Presents with rolling walker.  Dry hacking cough    HENT:   Head: Normocephalic and atraumatic.   Right Ear: Hearing, tympanic membrane, external ear and ear canal normal.   Left Ear: Hearing, tympanic membrane, external ear and ear canal normal.   Nose: Mucosal edema (mild), rhinorrhea (clear) and sinus tenderness present. No nasal deformity. No epistaxis. Right sinus exhibits maxillary sinus tenderness. Right sinus exhibits no frontal sinus tenderness. Left sinus exhibits maxillary sinus tenderness. Left sinus exhibits no frontal sinus tenderness.   Mouth/Throat: Uvula is midline, oropharynx is clear and moist and mucous membranes are normal. No trismus in the jaw. Normal dentition. No uvula swelling. No oropharyngeal exudate, posterior oropharyngeal edema, posterior oropharyngeal erythema or tonsillar abscesses.   Eyes: Conjunctivae, EOM and lids are normal. Pupils are equal, round, and reactive to light. No scleral icterus.   Sclera clear bilat   Neck: Trachea normal, normal range of motion, full passive range of motion without pain and phonation normal. Neck supple.   Cardiovascular: Regular rhythm, normal heart sounds, intact distal pulses and normal pulses. Tachycardia present.   's    Pulmonary/Chest: Effort normal. No accessory muscle usage or stridor. No tachypnea. No respiratory distress. She has decreased breath sounds in the right upper field, the right middle field, the right lower field, the left upper field, the left middle field and the left lower field. She has no wheezes. She has no rhonchi. She has rales in the left lower field.   Abdominal: Soft. Normal appearance and bowel sounds are normal. She exhibits no distension. There is no tenderness.   Musculoskeletal: Normal range of motion. She exhibits no edema  or deformity.   Neurological: She is alert and oriented to person, place, and time. She exhibits normal muscle tone. Coordination normal.   Skin: Skin is warm, dry and intact. She is not diaphoretic. No pallor.   Psychiatric: She has a normal mood and affect. Her speech is normal and behavior is normal. Judgment and thought content normal. Cognition and memory are normal.   Nursing note and vitals reviewed.        Reading Physician Reading Date Result Priority   Matt Hemphill MD 12/6/2018       Narrative     EXAMINATION:  XR CHEST PA AND LATERAL    CLINICAL HISTORY:  Chronic obstructive pulmonary disease with (acute) exacerbation    TECHNIQUE:  PA and lateral views of the chest were performed.    COMPARISON:  Chest radiograph 07/01/2018    FINDINGS:  Cardiomediastinal silhouette is stable without evidence of failure.  Calcific atherosclerosis of the thoracic aorta.  Senescent costochondral calcifications noted.  Pulmonary vasculature and hilar regions are within normal limits.  The lungs are symmetrically well expanded without large consolidation or new focal opacity.  No pleural effusion or pneumothorax.  No acute osseous process seen.      Impression       No detrimental change or radiographic acute intrathoracic process seen.      Electronically signed by: Matt Hemphill MD  Date: 12/06/2018  Time: 19:30       Results for orders placed or performed in visit on 12/06/18   POCT Influenza A/B   Result Value Ref Range    Rapid Influenza A Ag Negative Negative    Rapid Influenza B Ag Negative Negative     Acceptable Yes        Assessment:       1. Chills    2. COPD with exacerbation    3. SOB (shortness of breath)    4. Acute non-recurrent maxillary sinusitis        Plan:       Ashely was seen today for cough and otalgia.    Diagnoses and all orders for this visit:    Chills  -     POCT Influenza A/B    COPD with exacerbation  -     X-Ray Chest PA And Lateral; Future  -     benzonatate (TESSALON  PERLES) 100 MG capsule; Take 2 capsules (200 mg total) by mouth 3 (three) times daily as needed for Cough.  -     albuterol (PROVENTIL/VENTOLIN HFA) 90 mcg/actuation inhaler; Inhale 2 puffs into the lungs every 4 (four) hours as needed for Wheezing or Shortness of Breath. Rescue  -     guaifenesin-codeine 100-10 mg/5 ml (TUSSI-ORGANIDIN NR)  mg/5 mL syrup; Take 5 mLs by mouth every 8 (eight) hours as needed for Cough.  -     predniSONE (DELTASONE) 20 MG tablet; Take 2 tablets (40 mg total) by mouth once daily. for 5 days  -     doxycycline (VIBRAMYCIN) 100 MG Cap; Take 1 capsule (100 mg total) by mouth 2 (two) times daily. for 7 days    SOB (shortness of breath)  -     albuterol (PROVENTIL/VENTOLIN HFA) 90 mcg/actuation inhaler; Inhale 2 puffs into the lungs every 4 (four) hours as needed for Wheezing or Shortness of Breath. Rescue    Acute non-recurrent maxillary sinusitis  -     predniSONE (DELTASONE) 20 MG tablet; Take 2 tablets (40 mg total) by mouth once daily. for 5 days      She does not have an albuterol INH at home. will send rx.   Patient has NEB and treatments. No refills needed.   Discussed with patient and daughter that this is likely viral.   Mucinex with full glass of water.   Steroid burst.   Wait and see antibiotics. Discussed with daughter. She verbalized understanding.   Discussed warning s.s for reevaluation.     Patient Instructions     YOU HAVE BEEN GIVEN A PRESCRIPTION FOR ANTIBIOTICS. IT WAS ADVISED TO DELAY THE COURSE OF ANTIBIOTICS FOR 3-5 DAYS IF SYMPTOMS DO NOT RESOLVE. IT IMPORTANT TO FOLLOW THESE INSTRUCTIONS AS ANTIBIOTIC RESISTANCE IS HIGH. YOUR SYMPTOMS WILL LIKELY RESOLVE WITHOUT THIS PRESCRIPTION.       Please drink plenty of fluids.  Please get plenty of rest.  Please return here or go to the Emergency Department for any concerns or worsening of condition.  If you were given wait & see antibiotics, please wait 3-5 days before taking them, and only take them if your symptoms  have worsened or not improved.  If you do begin taking the antibiotics, please take them to completion.  If you were prescribed antibiotics, please take them to completion.  If you were prescribed a narcotic medication, do not drive or operate heavy equipment or machinery while taking these medications.  If you do not have Hypertension or any history of palpitations, it is ok to take over the counter Sudafed or Mucinex D or Allegra-D or Claritin-D or Zyrtec-D.  If you do take one of the above, it is ok to combine that with plain over the counter Mucinex or Allegra or Claritin or Zyrtec.  If for example you are taking Zyrtec -D, you can combine that with Mucinex, but not Mucinex-D.  If you are taking Mucinex-D, you can combine that with plain Allegra or Claritin or Zyrtec.   If you do have Hypertension or palpitations, it is safe to take Coricidin HBP for relief of sinus symptoms.  We recommend you take over the counter Flonase (Fluticasone) or another nasally inhaled steroid unless you are already taking one.  Nasal irrigation with a saline spray or Netti Pot like device per their directions is also recommended.  If not allergic, please take over the counter Tylenol (Acetaminophen) and/or Motrin (Ibuprofen) as directed for control of pain and/or fever.  Please follow up with your primary care doctor or specialist as needed.    If you  smoke, please stop smoking.      La sinusitis aguda  La sinusitis aguda es angela inflamación (irritación e hinchazón) de los senos paranasales. Por lo general es producida por angela infección viral después de un resfriado común. Mortensen médico puede ayudarle a obtener alivio para smooth síntomas. En esta publicación, encontrará más información.    ¿Qué es la sinusitis aguda?  Los senos paranasales son espacios llenos de aire dentro del cráneo, detrás de la reshma. Se mantienen húmedos y limpios yuki a mortensen recubrimiento de mucosa. Cuando el recubrimiento de mucosa se expone a polen, humo u otros  agentes irritantes puede inflamarse (hincharse). En respuesta a la irritación, la mucosa comienza entonces a producir más mucosidad y otros líquidos. El exceso de mucosidad puede hacer que los cilios, unos diminutos filamentos parecidos a pelos situados sobre la mucosa que ayudan a transportar el moco hacia la abertura del seno, dejen de funcionar temporalmente. La abertura de los senos puede obstruirse, con lo que la cavidad sinusal se llena de líquido y produce dolor profundo y presión. [place] también puede favorecer el crecimiento de bacterias dentro de los senos paranasales.  Síntomas comunes de la sinusitis aguda  Es posible que tenga:  · Dolor facial (en la reshma)  · Dolor de aroldo  · Fiebre  · Goteo posnasal  · Congestión nasal  · Drenaje que es espeso y de color, en lugar de ser seng  · Tos  Diagnóstico de la sinusitis aguda  El médico le preguntará sobre smooth síntomas y smooth antecedentes de lio. Le examinarán los oídos, la nariz y la garganta. Por lo general no se requieren radiografías.  En ocasiones, se hace un cultivo para wes si hay bacterias. Si tiene varios episodios de sinusitis, puede que le felisha pruebas con imágenes (shreyas radiografías o tomografías computarizadas) para wes si hay alguna causa anatómica que produzca la infección.  Tratamiento de la sinusitis aguda  El tratamiento de la sinusitis aguda está diseñado para aliviar la obstrucción de la abertura de los senos y ayudar a que los cilios vuelvan a funcionar. Con antihistamínicos y descongestionantes se puede reducir la inflamación y disminuir la producción de líquido. La infección por bacterias puede tratarse con antibióticos julian 10 a 14 días. [place] smooth antibióticos hasta que se le acaben, aunque se sienta mejor.  Date Last Reviewed: 5/16/2014  © 2296-5419 [employer], [employer]. [address] [place] [address]. Todos los derechos reservados. Esta información no pretende sustituir la atención médica profesional. Sólo mortensen  médico puede diagnosticar y tratar un problema de lio.            COPD Flare    You have had a flare-up of your COPD.  COPD, or chronic obstructive pulmonary disease, is a common lung disease. It causes your airways to become irritated and narrower. This makes it harder for you to breathe. Emphysema and chronic bronchitis are both types of COPD. This is a chronic condition, which means you always have it. Sometimes it gets worse. When this happens, it is called a flare-up.  Symptoms of COPD  People with COPD may have symptoms most of the time. In a flare-up, your symptoms get worse. These symptoms may mean you are having a flare-up:  · Shortness of breath, shallow or rapid breathing, or wheezing that gets worse  · Lung infection  · Cough that gets worse  · More mucus, thicker mucus or mucus of a different color  · Tiredness, decreased energy, or trouble doing your usual activities  · Fever  · Chest tightness  · Your symptoms dont get better even when you use your usual medicines, inhalers, and nebulizer  · Trouble talking  · You feel confused  Causes of flare-ups  Unfortunately, a flare-up can happen even though you did everything right, and you followed your doctors instructions. Some causes of flare-ups are:  · Smoking or secondhand smoke  · Colds, the flu, or respiratory infections  · Air pollution  · Sudden change in the weather  · Dust, irritating chemicals, or strong fumes  · Not taking your medicines as prescribed  Home care  Here are some things you can do at home to treat a flare-up:  · Try not to panic. This makes it harder to breathe, and keeps you from doing the right things.  · Dont smoke or be around others who are smoking.  · Try to drink more fluids than usual during a flare-up, unless your doctor has told you not to because of heart and kidney problems. More fluids can help loosen the mucus.  · Use your inhalers and nebulizer, if you have one, as you have been told to.  · If you were given  antibiotics, take them until they are used up or your doctor tells you to stop. Its important to finish the antibiotics, even though you feel better. This will make sure the infection has cleared.  · If you were given prednisone or another steroid, finish it even if you feel better.  Preventing a flare-up  Even though flare-ups happen, the best way to treat one is to prevent it before it starts. Here are some pointers:  · Dont smoke or be around others who are smoking.  · Take your medicines as you have been told.  · Talk with your doctor about getting a flu shot every year. Also find out if you need a pneumonia shot.  · If there is a weather advisory warning to stay indoors, try to stay inside when possible.  · Try to eat healthy and get plenty of sleep.  · Try to avoid things that usually set you off, like dust, chemical fumes, hairsprays, or strong perfumes.  Follow-up care  Follow up with your healthcare provider, or as advised.  If a culture was done, you will be told if your treatment needs to be changed. You can call as directed for the results.  If X-rays were done, you will be notified of any new findings that may affect your care.  Call 911  Call 911 if any of these occur:  · You have trouble breathing  · You feel confused or its difficult to wake you up  · You faint or lose consciousness  · You have a rapid heart rate  · You have new pain in your chest, arm, shoulder, neck or upper back  When to seek medical advice  Call your healthcare provider right away if any of these occur:  · Wheezing or shortness of breath gets worse  · You need to use your inhalers more often than usual without relief  · Fever of 100.4°F (38ºC) or higher, or as directed by your healthcare provider  · Coughing up lots of dark-colored or bloody mucus (sputum)  · Chest pain with each breath  · You do not start to get better within 24 hours  · Swelling of your ankles gets worse  · Dizziness or weakness  Date Last Reviewed:  9/1/2016  © 5574-9574 [employer], [employer]. [address] [place] [address], [place] [address]. All rights reserved. This information is not intended as a substitute for professional medical care. Always follow your healthcare professional's instructions.

## 2018-12-07 NOTE — PATIENT INSTRUCTIONS
YOU HAVE BEEN GIVEN A PRESCRIPTION FOR ANTIBIOTICS. IT WAS ADVISED TO DELAY THE COURSE OF ANTIBIOTICS FOR 3-5 DAYS IF SYMPTOMS DO NOT RESOLVE. IT IMPORTANT TO FOLLOW THESE INSTRUCTIONS AS ANTIBIOTIC RESISTANCE IS HIGH. YOUR SYMPTOMS WILL LIKELY RESOLVE WITHOUT THIS PRESCRIPTION.       Please drink plenty of fluids.  Please get plenty of rest.  Please return here or go to the Emergency Department for any concerns or worsening of condition.  If you were given wait & see antibiotics, please wait 3-5 days before taking them, and only take them if your symptoms have worsened or not improved.  If you do begin taking the antibiotics, please take them to completion.  If you were prescribed antibiotics, please take them to completion.  If you were prescribed a narcotic medication, do not drive or operate heavy equipment or machinery while taking these medications.  If you do not have Hypertension or any history of palpitations, it is ok to take over the counter Sudafed or Mucinex D or Allegra-D or Claritin-D or Zyrtec-D.  If you do take one of the above, it is ok to combine that with plain over the counter Mucinex or Allegra or Claritin or Zyrtec.  If for example you are taking Zyrtec -D, you can combine that with Mucinex, but not Mucinex-D.  If you are taking Mucinex-D, you can combine that with plain Allegra or Claritin or Zyrtec.   If you do have Hypertension or palpitations, it is safe to take Coricidin HBP for relief of sinus symptoms.  We recommend you take over the counter Flonase (Fluticasone) or another nasally inhaled steroid unless you are already taking one.  Nasal irrigation with a saline spray or Netti Pot like device per their directions is also recommended.  If not allergic, please take over the counter Tylenol (Acetaminophen) and/or Motrin (Ibuprofen) as directed for control of pain and/or fever.  Please follow up with your primary care doctor or specialist as needed.    If you  smoke, please stop  smoking.      La sinusitis aguda  La sinusitis aguda es angela inflamación (irritación e hinchazón) de los senos paranasales. Por lo general es producida por angela infección viral después de un resfriado común. Mortensen médico puede ayudarle a obtener alivio para smooth síntomas. En esta publicación, encontrará más información.    ¿Qué es la sinusitis aguda?  Los senos paranasales son espacios llenos de aire dentro del cráneo, detrás de la reshma. Se mantienen húmedos y limpios yuki a mortensen recubrimiento de mucosa. Cuando el recubrimiento de mucosa se expone a polen, humo u otros agentes irritantes puede inflamarse (hincharse). En respuesta a la irritación, la mucosa comienza entonces a producir más mucosidad y otros líquidos. El exceso de mucosidad puede hacer que los cilios, unos diminutos filamentos parecidos a pelos situados sobre la mucosa que ayudan a transportar el moco hacia la abertura del seno, dejen de funcionar temporalmente. La abertura de los senos puede obstruirse, con lo que la cavidad sinusal se llena de líquido y produce dolor profundo y presión. [place] también puede favorecer el crecimiento de bacterias dentro de los senos paranasales.  Síntomas comunes de la sinusitis aguda  Es posible que tenga:  · Dolor facial (en la reshma)  · Dolor de aroldo  · Fiebre  · Goteo posnasal  · Congestión nasal  · Drenaje que es espeso y de color, en lugar de ser sneg  · Tos  Diagnóstico de la sinusitis aguda  El médico le preguntará sobre smooth síntomas y smooth antecedentes de lio. Le examinarán los oídos, la nariz y la garganta. Por lo general no se requieren radiografías.  En ocasiones, se hace un cultivo para wes si hay bacterias. Si tiene varios episodios de sinusitis, puede que le felisha pruebas con imágenes (shreyas radiografías o tomografías computarizadas) para wes si hay alguna causa anatómica que produzca la infección.  Tratamiento de la sinusitis aguda  El tratamiento de la sinusitis aguda está diseñado para aliviar la  obstrucción de la abertura de los senos y ayudar a que los cilios vuelvan a funcionar. Con antihistamínicos y descongestionantes se puede reducir la inflamación y disminuir la producción de líquido. La infección por bacterias puede tratarse con antibióticos julian 10 a 14 días. [place] smooth antibióticos hasta que se le acaben, aunque se sienta mejor.  Date Last Reviewed: 5/16/2014 © 2000-2017 [employer]. [address] [place] [address], [place] [address]. Todos los derechos reservados. Esta información no pretende sustituir la atención médica profesional. Sólo mortensen médico puede diagnosticar y tratar un problema de lio.            COPD Flare    You have had a flare-up of your COPD.  COPD, or chronic obstructive pulmonary disease, is a common lung disease. It causes your airways to become irritated and narrower. This makes it harder for you to breathe. Emphysema and chronic bronchitis are both types of COPD. This is a chronic condition, which means you always have it. Sometimes it gets worse. When this happens, it is called a flare-up.  Symptoms of COPD  People with COPD may have symptoms most of the time. In a flare-up, your symptoms get worse. These symptoms may mean you are having a flare-up:  · Shortness of breath, shallow or rapid breathing, or wheezing that gets worse  · Lung infection  · Cough that gets worse  · More mucus, thicker mucus or mucus of a different color  · Tiredness, decreased energy, or trouble doing your usual activities  · Fever  · Chest tightness  · Your symptoms dont get better even when you use your usual medicines, inhalers, and nebulizer  · Trouble talking  · You feel confused  Causes of flare-ups  Unfortunately, a flare-up can happen even though you did everything right, and you followed your doctors instructions. Some causes of flare-ups are:  · Smoking or secondhand smoke  · Colds, the flu, or respiratory infections  · Air pollution  · Sudden change in the weather  · Dust,  irritating chemicals, or strong fumes  · Not taking your medicines as prescribed  Home care  Here are some things you can do at home to treat a flare-up:  · Try not to panic. This makes it harder to breathe, and keeps you from doing the right things.  · Dont smoke or be around others who are smoking.  · Try to drink more fluids than usual during a flare-up, unless your doctor has told you not to because of heart and kidney problems. More fluids can help loosen the mucus.  · Use your inhalers and nebulizer, if you have one, as you have been told to.  · If you were given antibiotics, take them until they are used up or your doctor tells you to stop. Its important to finish the antibiotics, even though you feel better. This will make sure the infection has cleared.  · If you were given prednisone or another steroid, finish it even if you feel better.  Preventing a flare-up  Even though flare-ups happen, the best way to treat one is to prevent it before it starts. Here are some pointers:  · Dont smoke or be around others who are smoking.  · Take your medicines as you have been told.  · Talk with your doctor about getting a flu shot every year. Also find out if you need a pneumonia shot.  · If there is a weather advisory warning to stay indoors, try to stay inside when possible.  · Try to eat healthy and get plenty of sleep.  · Try to avoid things that usually set you off, like dust, chemical fumes, hairsprays, or strong perfumes.  Follow-up care  Follow up with your healthcare provider, or as advised.  If a culture was done, you will be told if your treatment needs to be changed. You can call as directed for the results.  If X-rays were done, you will be notified of any new findings that may affect your care.  Call 911  Call 911 if any of these occur:  · You have trouble breathing  · You feel confused or its difficult to wake you up  · You faint or lose consciousness  · You have a rapid heart rate  · You have new  pain in your chest, arm, shoulder, neck or upper back  When to seek medical advice  Call your healthcare provider right away if any of these occur:  · Wheezing or shortness of breath gets worse  · You need to use your inhalers more often than usual without relief  · Fever of 100.4°F (38ºC) or higher, or as directed by your healthcare provider  · Coughing up lots of dark-colored or bloody mucus (sputum)  · Chest pain with each breath  · You do not start to get better within 24 hours  · Swelling of your ankles gets worse  · Dizziness or weakness  Date Last Reviewed: 9/1/2016  © 9076-1442 [employer]. [address] [place] [address], [place] [address]. All rights reserved. This information is not intended as a substitute for professional medical care. Always follow your healthcare professional's instructions.

## 2019-02-17 ENCOUNTER — OFFICE VISIT (OUTPATIENT)
Dept: URGENT CARE | Facility: CLINIC | Age: 84
End: 2019-02-17
Payer: MEDICARE

## 2019-02-17 VITALS
HEIGHT: 60 IN | TEMPERATURE: 98 F | HEART RATE: 99 BPM | DIASTOLIC BLOOD PRESSURE: 82 MMHG | BODY MASS INDEX: 25.13 KG/M2 | OXYGEN SATURATION: 96 % | SYSTOLIC BLOOD PRESSURE: 126 MMHG | RESPIRATION RATE: 16 BRPM | WEIGHT: 128 LBS

## 2019-02-17 DIAGNOSIS — B96.89 ACUTE BACTERIAL BRONCHITIS: Primary | ICD-10-CM

## 2019-02-17 DIAGNOSIS — J20.8 ACUTE BACTERIAL BRONCHITIS: Primary | ICD-10-CM

## 2019-02-17 DIAGNOSIS — J44.1 COPD WITH ACUTE EXACERBATION: ICD-10-CM

## 2019-02-17 PROCEDURE — 3074F SYST BP LT 130 MM HG: CPT | Mod: CPTII,S$GLB,, | Performed by: EMERGENCY MEDICINE

## 2019-02-17 PROCEDURE — 71046 XR CHEST PA AND LATERAL: ICD-10-PCS | Mod: S$GLB,,, | Performed by: RADIOLOGY

## 2019-02-17 PROCEDURE — 3079F PR MOST RECENT DIASTOLIC BLOOD PRESSURE 80-89 MM HG: ICD-10-PCS | Mod: CPTII,S$GLB,, | Performed by: EMERGENCY MEDICINE

## 2019-02-17 PROCEDURE — 3079F DIAST BP 80-89 MM HG: CPT | Mod: CPTII,S$GLB,, | Performed by: EMERGENCY MEDICINE

## 2019-02-17 PROCEDURE — 3074F PR MOST RECENT SYSTOLIC BLOOD PRESSURE < 130 MM HG: ICD-10-PCS | Mod: CPTII,S$GLB,, | Performed by: EMERGENCY MEDICINE

## 2019-02-17 PROCEDURE — 96372 PR INJECTION,THERAP/PROPH/DIAG2ST, IM OR SUBCUT: ICD-10-PCS | Mod: 59,S$GLB,, | Performed by: EMERGENCY MEDICINE

## 2019-02-17 PROCEDURE — 99499 UNLISTED E&M SERVICE: CPT | Mod: S$GLB,,, | Performed by: EMERGENCY MEDICINE

## 2019-02-17 PROCEDURE — 96372 THER/PROPH/DIAG INJ SC/IM: CPT | Mod: 59,S$GLB,, | Performed by: EMERGENCY MEDICINE

## 2019-02-17 PROCEDURE — 99214 PR OFFICE/OUTPT VISIT, EST, LEVL IV, 30-39 MIN: ICD-10-PCS | Mod: 25,S$GLB,, | Performed by: EMERGENCY MEDICINE

## 2019-02-17 PROCEDURE — 99499 RISK ADDL DX/OHS AUDIT: ICD-10-PCS | Mod: S$GLB,,, | Performed by: EMERGENCY MEDICINE

## 2019-02-17 PROCEDURE — 71046 X-RAY EXAM CHEST 2 VIEWS: CPT | Mod: S$GLB,,, | Performed by: RADIOLOGY

## 2019-02-17 PROCEDURE — 99214 OFFICE O/P EST MOD 30 MIN: CPT | Mod: 25,S$GLB,, | Performed by: EMERGENCY MEDICINE

## 2019-02-17 RX ORDER — BETAMETHASONE SODIUM PHOSPHATE AND BETAMETHASONE ACETATE 3; 3 MG/ML; MG/ML
6 INJECTION, SUSPENSION INTRA-ARTICULAR; INTRALESIONAL; INTRAMUSCULAR; SOFT TISSUE
Status: COMPLETED | OUTPATIENT
Start: 2019-02-17 | End: 2019-02-17

## 2019-02-17 RX ORDER — DOXYCYCLINE 100 MG/1
100 CAPSULE ORAL 2 TIMES DAILY
Qty: 20 CAPSULE | Refills: 0 | Status: SHIPPED | OUTPATIENT
Start: 2019-02-17 | End: 2019-02-27

## 2019-02-17 RX ORDER — BENZONATATE 100 MG/1
100 CAPSULE ORAL 3 TIMES DAILY PRN
Qty: 21 CAPSULE | Refills: 0 | Status: SHIPPED | OUTPATIENT
Start: 2019-02-17 | End: 2019-02-24

## 2019-02-17 RX ORDER — PREDNISONE 20 MG/1
TABLET ORAL
Qty: 5 TABLET | Refills: 0 | Status: SHIPPED | OUTPATIENT
Start: 2019-02-17 | End: 2019-10-17

## 2019-02-17 RX ADMIN — BETAMETHASONE SODIUM PHOSPHATE AND BETAMETHASONE ACETATE 6 MG: 3; 3 INJECTION, SUSPENSION INTRA-ARTICULAR; INTRALESIONAL; INTRAMUSCULAR; SOFT TISSUE at 04:02

## 2019-02-17 NOTE — PATIENT INSTRUCTIONS
Review bronchitis sheet  Review COPD exacerbation sheet  [place] shot given in clinic  Doxycycline prescription  Tessalon Perles prescription  Usually albuterol nebulizer treatments every 3-4 hours as needed for cough, wheezing, tightness in chest  Follow-up with primary care physician  Go to the emergency department if worse despite treatment.  Chest x-ray shows no obvious lobar consolidation.  Bronquitis [Bronchitis, Adult: Abx Tx]    La BRONQUITIS es angela infección de las vías respiratorias (bronquios) que ocurre a menudo julian el catarro común. Entre smooth síntomas se encuentran la tos con mucosidad (flema) y fiebre leve. La bronquitis suele durar 7-14 días. Los casos leves pueden tratarse con simples mónica caseros. Las infecciones más severas se tratan con antibióticos.  Cuidados En [place]:  1. Si los síntomas son severos, descanse en mortensen casa julian los primeros 2-3 días. Cuando reanude smooth actividades, evite cansarse demasiado.  2. No fume. No se exponga al humo de los demás.  3. Puede usar acetaminofeno (Tylenol) o ibuprofeno (Motrin o Advil) para controlar la fiebre o el dolor, a menos que le hayan recetado otro medicamento. [NOTA: Si tiene angela enfermedad hepática o renal crónica, o ha tenido alguna vez angela úlcera estomacal o sangrado gastrointestinal, consulte con mortensen médico antes de lisa estos medicamentos.]  4. Es posible que tenga poco apetito, por lo que angela dieta ligera es adecuada. Para evitar la deshidratación, elliot 6-8 vasos de líquido cada día (agua, refrescos, jugos de fruta, té, sopa etc.). La abundancia de líquido ayuda a desprender las secreciones de los pulmones.  5. Los medicamentos sin receta para la tos que contienen dextrometorfano (shreyas Robitussin DM) y los descongestionantes (shreyas Actifed o Sudafed) pueden ayudar a aliviar la tos y la congestión. [NOTA: No use descongestionantes si tiene la presión arterial mickie.]  6. Termine de lisa todos los antibióticos aunque ya se sienta  "mejor a los pocos días.  Aby angela VISITA DE CONTROL a mortensen médico, o según le indiquen, si no empieza a sentirse mejor después de dione días.  [NOTA: Si usted tiene 65 años o más, o si tiene asma crónica o enfermedad pulmonar obstructiva crónica, recomendamos angela VACUNA NEUMOCÓCICA cada leandro años y angela VACUNA CONTRA LA GRIPE cada otoño. Hable con mortensen médico sobre esto. Si le hicieron angela radiografía, esta será evaluada por un radiólogo y le informarán de los nuevos hallazgos que puedan afectar la atención médica que necesita.]  Busque Prontamente Atención Médica  si algo de lo siguiente ocurre:  · Fiebre superior a 100.4º F (38.0º C) julian más de dione días.  · Dificultad para respirar o silbidos o dolor al respirar.  · Tos con expulsión de joseph o aumento en la cantidad de esputo enrojecido.  · Debilidad, somnolencia, dolor de aroldo, dolor en la reshma o en los oídos, o rigidez en el maria.  Date Last Reviewed: 9/13/2015  © 1098-3927 [employer]. [address] [place] [address], [place] [address]. Todos los derechos reservados. Esta información no pretende sustituir la atención médica profesional. Sólo mortensen médico puede diagnosticar y tratar un problema de lio.        Brote de EPOC    Ha tenido un brote de mortensen EPOC.  La EPOC, o enfermedad pulmonar obstructiva crónica, es angela enfermedad común de los pulmones. Hace que smooth vías respiratorias se irriten y se estrechen. Por eso, le resulta más difícil respirar. El enfisema y la bronquitis crónica son dos tipos de EPOC. Es angela enfermedad crónica, lo que significa que la tendrá todo el tiempo. En ocasiones, se pone peor. Cuando eso sucede, es un "brote".  Síntomas de la EPOC  Las personas que tienen EPOC pueden tener síntomas todo el tiempo. Cuando tienen un brote, smooth síntomas empeoran. Los siguientes síntomas pueden significar que tiene un brote:  · Falta de aire; respiración rápida o superficial, o silbidos que empeoran  · Infección pulmonar  · Tos que " empeora  · Más mucosidad, mucosidad más espesa o de diferente color  · Cansancio, menos energía o dificultades para hacer regina actividades habituales  · Fiebre  · Sensación de opresión en el pecho  · Regina síntomas no mejoran, ni siquiera cuando usa el nebulizador, los inhaladores o regina medicamentos habituales  · Dificultades para hablar  · Se siente confundido  ¿Por qué se produce un brote?  Lamentablemente, un brote puede suceder aunque usted haya hecho todo alva y haya seguido las instrucciones de mortensen médico. Los siguientes son algunos de los motivos por los que se producen los brotes:  · Fumar o aspirar humo de segunda mano  · Resfriados, gripe o infecciones respiratorias  · Contaminación del aire  · Cambio repentino del clima  · Polvo, sustancias químicas irritantes o vapores isael  · No lisa regina medicamentos maliha shreyas le indicaron  Cuidados en la casa  Aquí tiene algunas cosas que puede hacer en mortensen casa para tratar un brote:  · Intente no desesperarse. Porque eso hará que le resulte más difícil respirar e impedirá que aby lo correcto.  · No fume ni esté cerca de otras personas que están fumando.  · Intente beber más líquidos que lo habitual cuando tenga un brote. Aby esto a menos que mortensen médico le haya dicho que no lo aby porque tiene problemas con el corazón o los riñones. Beber más líquidos puede ayudar a aflojar la mucosidad.  · Use regina inhaladores y mortensen nebulizador (si tiene mony), maliha shreyas le hayan indicado.  · Si le dieron antibióticos, tómelos todos o hasta que mortensen médico le diga que deje de tomarlos. Es importante que termine todos los antibióticos, incluso aunque se sienta mejor. Eso asegurará que la infección desaparezca.  · Si le dieron prednisona u otro esteroide, termínelo aunque se sienta mejor.  Cómo prevenir un brote  Aunque los brotes suceden, la mejor manera de tratarlo es evitar que comience. Aquí tiene algunos consejos:  · No fume ni esté cerca de otras personas que están fumando.  · [place] regina  medicamentos maliha shreyas le indicaron.  · Hable con mortensen médico acerca de aplicarse la vacuna anual contra la gripe (flu shot) todos los años. También pregunte si necesita angela vacuna contra la neumonía.  · Si hay advertencia de mal clima, intente quedarse adentro en lo posible.  · Intente comer de manera saludable y dormir mucho.  · Intente evitar las cosas que normalmente le provocan brotes. Por ejemplo, el polvo, los vapores químicos, el espray para el jacky o los perfumes isael.  Visita de control  Programe angela visita de control con mortensen proveedor de atención médica.  Si le hicieron un cultivo, le dirán si es necesario cambiarle el tratamiento. Puede llamar según le indiquen para conocer los resultados.  Si le hicieron radiografías, le dirán si hay algún cambio en los resultados que pueda afectar mortensen tratamiento.  Llame al 911  Llame al 911 si ocurre algo de lo siguiente:  · Tiene problemas para respirar  · Se siente confundido o es difícil despertarlo  · Se desmaya o queda inconsciente  · Tiene frecuencia cardíaca rápida  · Tiene un dolor nuevo en el pecho, el brazo, el hombro, el maria o la parte superior de la espalda  ¿Cuándo debe buscar atención médica?  Llame a mortensen proveedor de atención médica de inmediato si ocurre cualquiera de las siguientes situaciones:  · Empeora mortensen silbido o mortensen falta de aire  · Necesita usar inhaladores con más frecuencia que lo habitual y no lo alivian  · Tiene fiebre de 100.4 °F (38.3 ºC) o más, o según le haya indicado mortensen proveedor de atención médica  · Al toser, despide mucha mucosidad de color oscuro o con joseph  · Le duele el pecho con cada respiración  · No comienza a sentirse mejor en 24 horas  · La hinchazón en smooth tobillos empeora  · Tiene mareos o debilidad  Date Last Reviewed: 12/19/2016  © 8706-7734 [employer]. [address] [place] [address], [place] [address]. Todos los derechos reservados. Esta información no pretende sustituir la atención médica profesional. Sólo  mortensen médico puede diagnosticar y tratar un problema de lio.

## 2019-02-17 NOTE — PROGRESS NOTES
Subjective:       Patient ID: Ashely Holman is a 84 y.o. female.    Vitals:    02/17/19 1527   BP: 126/82   Pulse: 99   Resp: 16   Temp: 98.2 °F (36.8 °C)   TempSrc: Oral   SpO2: 96%   Weight: 58.1 kg (128 lb)   Height: 5' (1.524 m)       Chief Complaint: Cough    Patients daughter states that mom has been coughing for a while but it got worse 2 days ago.Patient doses have HX of COPD.Patient is using her nebulizer but it is not helping.  Patient denies fevers or significant shortness of breath however has been having coughing spells and occasional wheezing without normal resolution these symptoms with nebulizer treatments.      Cough   This is a new problem. Episode onset: worse 2 days ago. The problem has been gradually worsening. The problem occurs every few minutes. The cough is non-productive. Associated symptoms include rhinorrhea. Pertinent negatives include no chest pain, chills, ear pain, eye redness, fever, headaches, myalgias, sore throat, shortness of breath or wheezing. Nothing aggravates the symptoms. Treatments tried: neublizer. The treatment provided no relief. Her past medical history is significant for asthma, bronchitis and COPD.     Review of Systems   Constitution: Negative for chills, fever and malaise/fatigue.   HENT: Positive for rhinorrhea. Negative for congestion, ear pain, hoarse voice and sore throat.    Eyes: Negative for discharge and redness.   Cardiovascular: Negative for chest pain, dyspnea on exertion and leg swelling.   Respiratory: Positive for cough (Dry). Negative for shortness of breath, sputum production and wheezing.    Musculoskeletal: Negative for myalgias.   Gastrointestinal: Negative for abdominal pain and nausea.   Neurological: Negative for headaches.       Objective:      Physical Exam   Constitutional: She is oriented to person, place, and time. She appears well-developed and well-nourished. She is cooperative.  Non-toxic appearance. She does not appear ill. No  distress.   HENT:   Head: Normocephalic and atraumatic.   Right Ear: Hearing, tympanic membrane, external ear and ear canal normal.   Left Ear: Hearing, tympanic membrane, external ear and ear canal normal.   Nose: No mucosal edema, rhinorrhea or nasal deformity. No epistaxis. Right sinus exhibits no maxillary sinus tenderness and no frontal sinus tenderness. Left sinus exhibits no maxillary sinus tenderness and no frontal sinus tenderness.   Mouth/Throat: Uvula is midline, oropharynx is clear and moist and mucous membranes are normal. No trismus in the jaw. Normal dentition. No uvula swelling. No posterior oropharyngeal erythema.   Nasal congestion     Eyes: Conjunctivae and lids are normal. No scleral icterus.   Sclera clear bilat   Neck: Trachea normal, full passive range of motion without pain and phonation normal. Neck supple.   Cardiovascular: Normal rate, regular rhythm, normal heart sounds, intact distal pulses and normal pulses.   Pulmonary/Chest: Effort normal and breath sounds normal. No respiratory distress.   Occasional coughing spells with faint end expiratory wheezing noted improved with cough.  No increased work of breathing.   Abdominal: Soft. Normal appearance and bowel sounds are normal. There is no tenderness.   Musculoskeletal: Normal range of motion. She exhibits no edema or deformity.   No lower extremity edema   Neurological: She is alert and oriented to person, place, and time. She exhibits normal muscle tone. Coordination normal.   Skin: Skin is warm, dry and intact. She is not diaphoretic. No pallor.   Psychiatric: She has a normal mood and affect. Her speech is normal and behavior is normal. Cognition and memory are normal.   Nursing note and vitals reviewed.      Assessment:       1. Acute bacterial bronchitis    2. COPD with acute exacerbation        Plan:       [facility] was seen today for cough.    Diagnoses and all orders for this visit:    Acute bacterial bronchitis    COPD with  acute exacerbation  -     X-Ray Chest PA And Lateral; Future    Other orders  -     betamethasone acetate-betamethasone sodium phosphate injection 6 mg  -     doxycycline (VIBRAMYCIN) 100 MG Cap; Take 1 capsule (100 mg total) by mouth 2 (two) times daily. for 10 days  -     benzonatate (TESSALON PERLES) 100 MG capsule; Take 1 capsule (100 mg total) by mouth 3 (three) times daily as needed for Cough.  -     predniSONE (DELTASONE) 20 MG tablet; Take 1 tablets daily for 5 days          Patient Instructions   Review bronchitis sheet  Review COPD exacerbation sheet  [place] shot given in clinic  Doxycycline prescription  Tessalon Perles prescription  Usually albuterol nebulizer treatments every 3-4 hours as needed for cough, wheezing, tightness in chest  Follow-up with primary care physician  Go to the emergency department if worse despite treatment.  Chest x-ray shows no obvious lobar consolidation.  Bronquitis [Bronchitis, Adult: Abx Tx]    La BRONQUITIS es angela infección de las vías respiratorias (bronquios) que ocurre a menudo julain el catarro común. Entre smooth síntomas se encuentran la tos con mucosidad (flema) y fiebre leve. La bronquitis suele durar 7-14 días. Los casos leves pueden tratarse con simples mónica caseros. Las infecciones más severas se tratan con antibióticos.  Cuidados En [place]:  1. Si los síntomas son severos, descanse en mortensen casa julian los primeros 2-3 días. Cuando reanude smooth actividades, evite cansarse demasiado.  2. No fume. No se exponga al humo de los demás.  3. Puede usar acetaminofeno (Tylenol) o ibuprofeno (Motrin o Advil) para controlar la fiebre o el dolor, a menos que le hayan recetado otro medicamento. [NOTA: Si tiene angela enfermedad hepática o renal crónica, o ha tenido alguna vez angela úlcera estomacal o sangrado gastrointestinal, consulte con mortensen médico antes de lisa estos medicamentos.]  4. Es posible que tenga poco apetito, por lo que angela dieta ligera es adecuada. Para evitar  la deshidratación, elliot 6-8 vasos de líquido cada día (agua, refrescos, jugos de fruta, té, sopa etc.). La abundancia de líquido ayuda a desprender las secreciones de los pulmones.  5. Los medicamentos sin receta para la tos que contienen dextrometorfano (shreyas Robitussin DM) y los descongestionantes (shreyas Actifed o Sudafed) pueden ayudar a aliviar la tos y la congestión. [NOTA: No use descongestionantes si tiene la presión arterial mickie.]  6. Termine de lisa todos los antibióticos aunque ya se sienta mejor a los pocos días.  Aby angela VISITA DE CONTROL a mortensen médico, o según le indiquen, si no empieza a sentirse mejor después de dione días.  [NOTA: Si usted tiene 65 años o más, o si tiene asma crónica o enfermedad pulmonar obstructiva crónica, recomendamos angela VACUNA NEUMOCÓCICA cada leandro años y angela VACUNA CONTRA LA GRIPE cada otoño. Hable con mortensen médico sobre esto. Si le hicieron angela radiografía, esta será evaluada por un radiólogo y le informarán de los nuevos hallazgos que puedan afectar la atención médica que necesita.]  Busque Prontamente Atención Médica  si algo de lo siguiente ocurre:  · Fiebre superior a 100.4º F (38.0º C) julian más de dione días.  · Dificultad para respirar o silbidos o dolor al respirar.  · Tos con expulsión de joseph o aumento en la cantidad de esputo enrojecido.  · Debilidad, somnolencia, dolor de aroldo, dolor en la reshma o en los oídos, o rigidez en el maria.  Date Last Reviewed: 9/13/2015  © 6799-3756 [employer]. [address] [place] [address], [place] [address]. Todos los derechos reservados. Esta información no pretende sustituir la atención médica profesional. Sólo mortensen médico puede diagnosticar y tratar un problema de lio.        Brote de EPOC    Ha tenido un brote de mortensen EPOC.  La EPOC, o enfermedad pulmonar obstructiva crónica, es angela enfermedad común de los pulmones. Hace que smooth vías respiratorias se irriten y se estrechen. Por eso, le resulta más difícil respirar. El  "enfisema y la bronquitis crónica son dos tipos de EPOC. Es angela enfermedad crónica, lo que significa que la tendrá todo el tiempo. En ocasiones, se pone peor. Cuando eso sucede, es un "brote".  Síntomas de la EPOC  Las personas que tienen EPOC pueden tener síntomas todo el tiempo. Cuando tienen un brote, regina síntomas empeoran. Los siguientes síntomas pueden significar que tiene un brote:  · Falta de aire; respiración rápida o superficial, o silbidos que empeoran  · Infección pulmonar  · Tos que empeora  · Más mucosidad, mucosidad más espesa o de diferente color  · Cansancio, menos energía o dificultades para hacer regina actividades habituales  · Fiebre  · Sensación de opresión en el pecho  · Regina síntomas no mejoran, ni siquiera cuando usa el nebulizador, los inhaladores o regina medicamentos habituales  · Dificultades para hablar  · Se siente confundido  ¿Por qué se produce un brote?  Lamentablemente, un brote puede suceder aunque usted haya hecho todo alva y haya seguido las instrucciones de mortensen médico. Los siguientes son algunos de los motivos por los que se producen los brotes:  · Fumar o aspirar humo de segunda mano  · Resfriados, gripe o infecciones respiratorias  · Contaminación del aire  · Cambio repentino del clima  · Polvo, sustancias químicas irritantes o vapores isael  · No lisa regina medicamentos maliha shreyas le indicaron  Cuidados en la casa  Aquí tiene algunas cosas que puede hacer en mortensen casa para tratar un brote:  · Intente no desesperarse. Porque eso hará que le resulte más difícil respirar e impedirá que aby lo correcto.  · No fume ni esté cerca de otras personas que están fumando.  · Intente beber más líquidos que lo habitual cuando tenga un brote. Aby esto a menos que mortensen médico le haya dicho que no lo aby porque tiene problemas con el corazón o los riñones. Beber más líquidos puede ayudar a aflojar la mucosidad.  · Use regina inhaladores y mortensen nebulizador (si tiene mony), maliha shreyas le hayan indicado.  · Si le " dieron antibióticos, tómelos todos o hasta que mortensen médico le diga que deje de tomarlos. Es importante que termine todos los antibióticos, incluso aunque se sienta mejor. Eso asegurará que la infección desaparezca.  · Si le dieron prednisona u otro esteroide, termínelo aunque se sienta mejor.  Cómo prevenir un brote  Aunque los brotes suceden, la mejor manera de tratarlo es evitar que comience. Aquí tiene algunos consejos:  · No fume ni esté cerca de otras personas que están fumando.  · [place] smooth medicamentos maliha shreyas le indicaron.  · Hable con mortensen médico acerca de aplicarse la vacuna anual contra la gripe (flu shot) todos los años. También pregunte si necesita angela vacuna contra la neumonía.  · Si hay advertencia de mal clima, intente quedarse adentro en lo posible.  · Intente comer de manera saludable y dormir mucho.  · Intente evitar las cosas que normalmente le provocan brotes. Por ejemplo, el polvo, los vapores químicos, el espray para el jacky o los perfumes isael.  Visita de control  Programe angela visita de control con mortensen proveedor de atención médica.  Si le hicieron un cultivo, le dirán si es necesario cambiarle el tratamiento. Puede llamar según le indiquen para conocer los resultados.  Si le hicieron radiografías, le dirán si hay algún cambio en los resultados que pueda afectar mortensen tratamiento.  Llame al 911  Llame al 911 si ocurre algo de lo siguiente:  · Tiene problemas para respirar  · Se siente confundido o es difícil despertarlo  · Se desmaya o queda inconsciente  · Tiene frecuencia cardíaca rápida  · Tiene un dolor nuevo en el pecho, el brazo, el hombro, el maria o la parte superior de la espalda  ¿Cuándo debe buscar atención médica?  Llame a mortensen proveedor de atención médica de inmediato si ocurre cualquiera de las siguientes situaciones:  · Empeora mortensen silbido o mortensen falta de aire  · Necesita usar inhaladores con más frecuencia que lo habitual y no lo alivian  · Tiene fiebre de 100.4 °F (38.3 ºC) o más, o  según le haya indicado mortensen proveedor de atención médica  · Al toser, despide mucha mucosidad de color oscuro o con joseph  · Le duele el pecho con cada respiración  · No comienza a sentirse mejor en 24 horas  · La hinchazón en smooth tobillos empeora  · Tiene mareos o debilidad  Date Last Reviewed: 12/19/2016  © 6487-1508 [email or web address]. [address] [place] [address], [place] [address]. Todos los derechos reservados. Esta información no pretende sustituir la atención médica profesional. Sólo mortensen médico puede diagnosticar y tratar un problema de lio.

## 2019-06-10 RX ORDER — LOSARTAN POTASSIUM 100 MG/1
100 TABLET ORAL DAILY
Qty: 90 TABLET | Refills: 3 | Status: SHIPPED | OUTPATIENT
Start: 2019-06-10 | End: 2020-03-29

## 2019-06-11 LAB
ALBUMIN SERPL-MCNC: 3.3 G/DL (ref 3.6–5.1)
ALBUMIN/GLOB SERPL: 1.4 (CALC) (ref 1–2.5)
ALP SERPL-CCNC: 62 U/L (ref 33–130)
ALT SERPL-CCNC: 9 U/L (ref 6–29)
AST SERPL-CCNC: 12 U/L (ref 10–35)
BASOPHILS # BLD AUTO: 7 CELLS/UL (ref 0–200)
BASOPHILS NFR BLD AUTO: 0.1 %
BILIRUB SERPL-MCNC: 0.3 MG/DL (ref 0.2–1.2)
BUN SERPL-MCNC: 25 MG/DL (ref 7–25)
BUN/CREAT SERPL: 18 (CALC) (ref 6–22)
CALCIUM SERPL-MCNC: 9.3 MG/DL (ref 8.6–10.4)
CHLORIDE SERPL-SCNC: 107 MMOL/L (ref 98–110)
CHOLEST SERPL-MCNC: 132 MG/DL
CHOLEST/HDLC SERPL: 2.7 (CALC)
CO2 SERPL-SCNC: 25 MMOL/L (ref 20–32)
CREAT SERPL-MCNC: 1.42 MG/DL (ref 0.6–0.88)
EOSINOPHIL # BLD AUTO: 287 CELLS/UL (ref 15–500)
EOSINOPHIL NFR BLD AUTO: 4.1 %
ERYTHROCYTE [DISTWIDTH] IN BLOOD BY AUTOMATED COUNT: 17.1 % (ref 11–15)
GFRSERPLBLD MDRD-ARVRAT: 34 ML/MIN/1.73M2
GLOBULIN SER CALC-MCNC: 2.4 G/DL (CALC) (ref 1.9–3.7)
GLUCOSE SERPL-MCNC: 161 MG/DL (ref 65–139)
HBA1C MFR BLD: 6 % OF TOTAL HGB
HCT VFR BLD AUTO: 31.8 % (ref 35–45)
HDLC SERPL-MCNC: 49 MG/DL
HGB BLD-MCNC: 9.2 G/DL (ref 11.7–15.5)
IRON SATN MFR SERPL: 13 % (CALC) (ref 11–50)
IRON SERPL-MCNC: 40 MCG/DL (ref 45–160)
LDLC SERPL CALC-MCNC: 57 MG/DL (CALC)
LYMPHOCYTES # BLD AUTO: 1379 CELLS/UL (ref 850–3900)
LYMPHOCYTES NFR BLD AUTO: 19.7 %
MCH RBC QN AUTO: 21.6 PG (ref 27–33)
MCHC RBC AUTO-ENTMCNC: 28.9 G/DL (ref 32–36)
MCV RBC AUTO: 74.6 FL (ref 80–100)
MONOCYTES # BLD AUTO: 994 CELLS/UL (ref 200–950)
MONOCYTES NFR BLD AUTO: 14.2 %
NEUTROPHILS # BLD AUTO: 4333 CELLS/UL (ref 1500–7800)
NEUTROPHILS NFR BLD AUTO: 61.9 %
NONHDLC SERPL-MCNC: 83 MG/DL (CALC)
PLATELET # BLD AUTO: 137 THOUSAND/UL (ref 140–400)
PMV BLD REES-ECKER: 11.7 FL (ref 7.5–12.5)
POTASSIUM SERPL-SCNC: 3.9 MMOL/L (ref 3.5–5.3)
PROT SERPL-MCNC: 5.7 G/DL (ref 6.1–8.1)
RBC # BLD AUTO: 4.26 MILLION/UL (ref 3.8–5.1)
SODIUM SERPL-SCNC: 141 MMOL/L (ref 135–146)
T4 FREE SERPL-MCNC: 1.6 NG/DL (ref 0.8–1.8)
TIBC SERPL-MCNC: 319 MCG/DL (CALC) (ref 250–450)
TRIGL SERPL-MCNC: 182 MG/DL
TSH SERPL-ACNC: 0.11 MIU/L (ref 0.4–4.5)
VIT B12 SERPL-MCNC: 436 PG/ML (ref 200–1100)
WBC # BLD AUTO: 7 THOUSAND/UL (ref 3.8–10.8)

## 2019-06-12 ENCOUNTER — OFFICE VISIT (OUTPATIENT)
Dept: INTERNAL MEDICINE | Facility: CLINIC | Age: 84
End: 2019-06-12
Attending: INTERNAL MEDICINE
Payer: MEDICARE

## 2019-06-12 ENCOUNTER — HOSPITAL ENCOUNTER (OUTPATIENT)
Dept: RADIOLOGY | Facility: OTHER | Age: 84
Discharge: HOME | End: 2019-06-12
Attending: INTERNAL MEDICINE
Payer: MEDICARE

## 2019-06-12 VITALS
HEIGHT: 60 IN | OXYGEN SATURATION: 97 % | BODY MASS INDEX: 27.29 KG/M2 | HEART RATE: 96 BPM | DIASTOLIC BLOOD PRESSURE: 80 MMHG | SYSTOLIC BLOOD PRESSURE: 120 MMHG | WEIGHT: 139 LBS

## 2019-06-12 DIAGNOSIS — D50.9 IRON DEFICIENCY ANEMIA, UNSPECIFIED IRON DEFICIENCY ANEMIA TYPE: Primary | ICD-10-CM

## 2019-06-12 DIAGNOSIS — I10 ESSENTIAL HYPERTENSION: ICD-10-CM

## 2019-06-12 DIAGNOSIS — Z00.00 ROUTINE ADULT HEALTH MAINTENANCE: ICD-10-CM

## 2019-06-12 DIAGNOSIS — Z13.39 SCREENING FOR ALCOHOLISM: ICD-10-CM

## 2019-06-12 DIAGNOSIS — J45.20 MILD INTERMITTENT ASTHMA WITHOUT COMPLICATION: ICD-10-CM

## 2019-06-12 DIAGNOSIS — E03.9 ACQUIRED HYPOTHYROIDISM: ICD-10-CM

## 2019-06-12 DIAGNOSIS — N18.30 TYPE 2 DIABETES MELLITUS WITH STAGE 3 CHRONIC KIDNEY DISEASE, WITHOUT LONG-TERM CURRENT USE OF INSULIN: ICD-10-CM

## 2019-06-12 DIAGNOSIS — N18.30 CKD (CHRONIC KIDNEY DISEASE) STAGE 3, GFR 30-59 ML/MIN: ICD-10-CM

## 2019-06-12 DIAGNOSIS — E11.8 TYPE 2 DIABETES MELLITUS WITH COMPLICATION, WITHOUT LONG-TERM CURRENT USE OF INSULIN: ICD-10-CM

## 2019-06-12 DIAGNOSIS — Z13.31 SCREENING FOR DEPRESSION: ICD-10-CM

## 2019-06-12 DIAGNOSIS — M81.0 OSTEOPOROSIS, POST-MENOPAUSAL: ICD-10-CM

## 2019-06-12 DIAGNOSIS — E11.22 TYPE 2 DIABETES MELLITUS WITH STAGE 3 CHRONIC KIDNEY DISEASE, WITHOUT LONG-TERM CURRENT USE OF INSULIN: ICD-10-CM

## 2019-06-12 DIAGNOSIS — R06.02 SOB (SHORTNESS OF BREATH) ON EXERTION: ICD-10-CM

## 2019-06-12 PROCEDURE — 71046 X-RAY EXAM CHEST 2 VIEWS: CPT | Mod: 26,,, | Performed by: RADIOLOGY

## 2019-06-12 PROCEDURE — G0442 ANNUAL ALCOHOL SCREEN 15 MIN: HCPCS | Mod: 59,S$GLB,, | Performed by: INTERNAL MEDICINE

## 2019-06-12 PROCEDURE — G0444 PR DEPRESSION SCREENING: ICD-10-PCS | Mod: 59,S$GLB,, | Performed by: INTERNAL MEDICINE

## 2019-06-12 PROCEDURE — 99215 PR OFFICE/OUTPT VISIT, EST, LEVL V, 40-54 MIN: ICD-10-PCS | Mod: 25,S$GLB,, | Performed by: INTERNAL MEDICINE

## 2019-06-12 PROCEDURE — 99215 OFFICE O/P EST HI 40 MIN: CPT | Mod: 25,S$GLB,, | Performed by: INTERNAL MEDICINE

## 2019-06-12 PROCEDURE — 3074F SYST BP LT 130 MM HG: CPT | Mod: CPTII,S$GLB,, | Performed by: INTERNAL MEDICINE

## 2019-06-12 PROCEDURE — 99499 UNLISTED E&M SERVICE: CPT | Mod: S$GLB,,, | Performed by: INTERNAL MEDICINE

## 2019-06-12 PROCEDURE — 3079F DIAST BP 80-89 MM HG: CPT | Mod: CPTII,S$GLB,, | Performed by: INTERNAL MEDICINE

## 2019-06-12 PROCEDURE — G0444 DEPRESSION SCREEN ANNUAL: HCPCS | Mod: 59,S$GLB,, | Performed by: INTERNAL MEDICINE

## 2019-06-12 PROCEDURE — 3074F PR MOST RECENT SYSTOLIC BLOOD PRESSURE < 130 MM HG: ICD-10-PCS | Mod: CPTII,S$GLB,, | Performed by: INTERNAL MEDICINE

## 2019-06-12 PROCEDURE — 71046 X-RAY EXAM CHEST 2 VIEWS: CPT | Mod: TC,FY

## 2019-06-12 PROCEDURE — 3079F PR MOST RECENT DIASTOLIC BLOOD PRESSURE 80-89 MM HG: ICD-10-PCS | Mod: CPTII,S$GLB,, | Performed by: INTERNAL MEDICINE

## 2019-06-12 PROCEDURE — 99499 RISK ADDL DX/OHS AUDIT: ICD-10-PCS | Mod: S$GLB,,, | Performed by: INTERNAL MEDICINE

## 2019-06-12 PROCEDURE — G0442 PR  ALCOHOL SCREENING: ICD-10-PCS | Mod: 59,S$GLB,, | Performed by: INTERNAL MEDICINE

## 2019-06-12 PROCEDURE — 71046 XR CHEST PA AND LATERAL: ICD-10-PCS | Mod: 26,,, | Performed by: RADIOLOGY

## 2019-06-12 RX ORDER — LEVOTHYROXINE SODIUM 75 UG/1
75 TABLET ORAL DAILY
Qty: 30 TABLET | Refills: 11 | Status: SHIPPED | OUTPATIENT
Start: 2019-06-12 | End: 2020-07-01

## 2019-06-12 RX ORDER — FERROUS GLUCONATE 324(38)MG
324 TABLET ORAL
Qty: 60 TABLET | Refills: 5 | Status: SHIPPED | OUTPATIENT
Start: 2019-06-12 | End: 2021-08-24 | Stop reason: SDUPTHER

## 2019-06-12 NOTE — PROGRESS NOTES
Subjective:       Patient ID: Ashely Holman is a 85 y.o. female.    Chief Complaint: Follow-up (needs order for new rollator with seat); Shortness of Breath; and Low-back Pain (off and on)    SOB better with using the Trelegy daily.    Shortness of Breath   This is a chronic problem. The current episode started more than 1 year ago. The problem has been gradually worsening. Pertinent negatives include no chest pain.   Low-back Pain   This is a chronic problem. The current episode started more than 1 year ago. The problem has been gradually worsening. Associated symptoms include arthralgias. Pertinent negatives include no chest pain.   Hypertension   This is a chronic problem. The current episode started more than 1 year ago. The problem is controlled. Associated symptoms include shortness of breath. Pertinent negatives include no chest pain.   Diabetes   She presents for her follow-up diabetic visit. She has type 2 diabetes mellitus. Her disease course has been stable. Pertinent negatives for diabetes include no chest pain.     Review of Systems   Constitutional: Negative.    Respiratory: Positive for shortness of breath.    Cardiovascular: Negative for chest pain.   Musculoskeletal: Positive for arthralgias and back pain.   Psychiatric/Behavioral: Negative for dysphoric mood.       Objective:      Physical Exam   Constitutional: She appears well-developed and well-nourished.   HENT:   Head: Normocephalic.   Eyes: Pupils are equal, round, and reactive to light.   Cardiovascular: Normal rate, regular rhythm and normal heart sounds. Exam reveals no friction rub.   Pulmonary/Chest: Effort normal. She has rhonchi in the left lower field.   Neurological: She is alert.       Assessment:       1. Iron deficiency anemia, unspecified iron deficiency anemia type    2. Acquired hypothyroidism    3. Osteoporosis, post-menopausal    4. Type 2 diabetes mellitus with complication, without long-term current use of insulin    5.  CKD (chronic kidney disease) stage 3, GFR 30-59 ml/min    6. Essential hypertension    7. Type 2 diabetes mellitus with stage 3 chronic kidney disease, without long-term current use of insulin    8. Mild intermittent asthma without complication    9. SOB (shortness of breath) on exertion    10. Routine adult health maintenance    11. Screening for depression    12. Screening for alcoholism        Plan:       Per orders and D/C instructions.  Continue meds/diet for osteoporosis, CKD, Asthma, DM, HTN, and high cholest. which are stable.     Decrease Synth for hypothyroid.  CXR to evaluate SOB.  Rolling walker for LBP.    Screening: The patient was screened for depression with the PHQ2 questionnaire and possible health consequences were discussed with the patient, who understands (15 minutes spent). The patient was screened for the misuse of alcohol, by asking the number of drinks per average week, and if pt has had more than 4 drinks (more than 3 for women and elderly) in 1 day within the past year. The health and legal consequences of misuse were discussed (15 minutes spent). The patient was screened for obesity (BMI>30), If the current BMI > 30, then the possible consequences of obesity, as well as the benefits of diet, exercise, and weight loss were discussed. Any behavioral risks were identified, and methods to achieve appropriate treatment goals were discussed (15 minutes spent).

## 2019-10-09 DIAGNOSIS — R79.89 OTHER SPECIFIED ABNORMAL FINDINGS OF BLOOD CHEMISTRY: ICD-10-CM

## 2019-10-09 DIAGNOSIS — E55.9 VITAMIN D DEFICIENCY: ICD-10-CM

## 2019-10-09 DIAGNOSIS — D50.8 OTHER IRON DEFICIENCY ANEMIA: ICD-10-CM

## 2019-10-09 DIAGNOSIS — E78.9 DISORDER OF LIPID METABOLISM: ICD-10-CM

## 2019-10-09 DIAGNOSIS — E03.9 HYPOTHYROIDISM, UNSPECIFIED TYPE: ICD-10-CM

## 2019-10-09 DIAGNOSIS — D51.0 PERNICIOUS ANEMIA: ICD-10-CM

## 2019-10-17 ENCOUNTER — OFFICE VISIT (OUTPATIENT)
Dept: INTERNAL MEDICINE | Facility: CLINIC | Age: 84
End: 2019-10-17
Attending: INTERNAL MEDICINE
Payer: MEDICARE

## 2019-10-17 VITALS
WEIGHT: 145 LBS | BODY MASS INDEX: 28.47 KG/M2 | SYSTOLIC BLOOD PRESSURE: 128 MMHG | DIASTOLIC BLOOD PRESSURE: 82 MMHG | HEART RATE: 107 BPM | OXYGEN SATURATION: 95 % | HEIGHT: 60 IN

## 2019-10-17 DIAGNOSIS — E11.8 TYPE 2 DIABETES MELLITUS WITH COMPLICATION, WITHOUT LONG-TERM CURRENT USE OF INSULIN: ICD-10-CM

## 2019-10-17 DIAGNOSIS — D50.9 IRON DEFICIENCY ANEMIA, UNSPECIFIED IRON DEFICIENCY ANEMIA TYPE: Primary | ICD-10-CM

## 2019-10-17 DIAGNOSIS — E03.9 ACQUIRED HYPOTHYROIDISM: ICD-10-CM

## 2019-10-17 DIAGNOSIS — I10 ESSENTIAL HYPERTENSION: ICD-10-CM

## 2019-10-17 DIAGNOSIS — R05.9 COUGH: ICD-10-CM

## 2019-10-17 PROCEDURE — 90662 IIV NO PRSV INCREASED AG IM: CPT | Mod: S$GLB,,, | Performed by: INTERNAL MEDICINE

## 2019-10-17 PROCEDURE — 3079F PR MOST RECENT DIASTOLIC BLOOD PRESSURE 80-89 MM HG: ICD-10-PCS | Mod: CPTII,S$GLB,, | Performed by: INTERNAL MEDICINE

## 2019-10-17 PROCEDURE — 90662 FLU VACCINE - HIGH DOSE (65+) PRESERVATIVE FREE IM: ICD-10-PCS | Mod: S$GLB,,, | Performed by: INTERNAL MEDICINE

## 2019-10-17 PROCEDURE — 99499 RISK ADDL DX/OHS AUDIT: ICD-10-PCS | Mod: S$GLB,,, | Performed by: INTERNAL MEDICINE

## 2019-10-17 PROCEDURE — G0008 ADMIN INFLUENZA VIRUS VAC: HCPCS | Mod: S$GLB,,, | Performed by: INTERNAL MEDICINE

## 2019-10-17 PROCEDURE — 3074F SYST BP LT 130 MM HG: CPT | Mod: CPTII,S$GLB,, | Performed by: INTERNAL MEDICINE

## 2019-10-17 PROCEDURE — 99214 PR OFFICE/OUTPT VISIT, EST, LEVL IV, 30-39 MIN: ICD-10-PCS | Mod: 25,S$GLB,, | Performed by: INTERNAL MEDICINE

## 2019-10-17 PROCEDURE — 99214 OFFICE O/P EST MOD 30 MIN: CPT | Mod: 25,S$GLB,, | Performed by: INTERNAL MEDICINE

## 2019-10-17 PROCEDURE — 99499 UNLISTED E&M SERVICE: CPT | Mod: S$GLB,,, | Performed by: INTERNAL MEDICINE

## 2019-10-17 PROCEDURE — 3079F DIAST BP 80-89 MM HG: CPT | Mod: CPTII,S$GLB,, | Performed by: INTERNAL MEDICINE

## 2019-10-17 PROCEDURE — 3074F PR MOST RECENT SYSTOLIC BLOOD PRESSURE < 130 MM HG: ICD-10-PCS | Mod: CPTII,S$GLB,, | Performed by: INTERNAL MEDICINE

## 2019-10-17 PROCEDURE — G0008 FLU VACCINE - HIGH DOSE (65+) PRESERVATIVE FREE IM: ICD-10-PCS | Mod: S$GLB,,, | Performed by: INTERNAL MEDICINE

## 2019-10-17 RX ORDER — CODEINE PHOSPHATE AND GUAIFENESIN 10; 100 MG/5ML; MG/5ML
5 SOLUTION ORAL NIGHTLY PRN
Qty: 236 ML | Refills: 0 | Status: SHIPPED | OUTPATIENT
Start: 2019-10-17 | End: 2019-10-27

## 2019-10-17 RX ORDER — FLUTICASONE FUROATE AND VILANTEROL 200; 25 UG/1; UG/1
1 POWDER RESPIRATORY (INHALATION) DAILY
Qty: 60 EACH | Refills: 11 | Status: SHIPPED | OUTPATIENT
Start: 2019-10-17 | End: 2020-12-10

## 2019-10-17 RX ORDER — DOXYCYCLINE HYCLATE 100 MG
100 TABLET ORAL 2 TIMES DAILY
Qty: 14 TABLET | Refills: 0 | Status: SHIPPED | OUTPATIENT
Start: 2019-10-17 | End: 2020-03-24

## 2019-10-17 NOTE — PROGRESS NOTES
Subjective:       Patient ID: Ashely Holman is a 85 y.o. female.    Chief Complaint: Follow-up (4 month); Cough (non productive / 2+ weeks); and Fever (low grade)    She is not using Trelegy.  The daughter says she likes Breo better.  She has had a nonproductive cough for the last few weeks.  She has had subjective fevers.      Follow-up   This is a chronic problem. The current episode started more than 1 year ago. The problem has been unchanged. Associated symptoms include coughing. Pertinent negatives include no chest pain.   Cough   This is a recurrent problem. The current episode started 1 to 4 weeks ago. The problem has been unchanged. Pertinent negatives include no chest pain or shortness of breath.   Diabetes   She presents for her follow-up diabetic visit. She has type 2 diabetes mellitus. Her disease course has been stable. Pertinent negatives for diabetes include no chest pain.     Review of Systems   Constitutional: Negative.    Respiratory: Positive for cough. Negative for shortness of breath.    Cardiovascular: Negative for chest pain.       Objective:      Physical Exam   Constitutional: She appears well-developed and well-nourished.   HENT:   Head: Normocephalic.   Eyes: Pupils are equal, round, and reactive to light.   Cardiovascular: Normal rate, regular rhythm and normal heart sounds. Exam reveals no friction rub.   Pulmonary/Chest: Effort normal. She has rales in the right lower field and the left lower field.   Neurological: She is alert.       Assessment:       1. Iron deficiency anemia, unspecified iron deficiency anemia type    2. Acquired hypothyroidism    3. Type 2 diabetes mellitus with complication, without long-term current use of insulin    4. Essential hypertension    5. Cough        Plan:       Per orders and D/C instructions.  Continue meds/diet for DM, HTN, hypothyroid, and anemia, which are stable.     doxycycline and Robitussin AC for asthma exacerbation.  Labs were done at  [facility] this morning.

## 2019-10-18 LAB
25(OH)D3 SERPL-MCNC: 27 NG/ML (ref 30–100)
ALBUMIN SERPL-MCNC: 3.8 G/DL (ref 3.6–5.1)
ALBUMIN/GLOB SERPL: 1.3 (CALC) (ref 1–2.5)
ALP SERPL-CCNC: 85 U/L (ref 33–130)
ALT SERPL-CCNC: 5 U/L (ref 6–29)
AST SERPL-CCNC: 12 U/L (ref 10–35)
BASOPHILS # BLD AUTO: 31 CELLS/UL (ref 0–200)
BASOPHILS NFR BLD AUTO: 0.4 %
BILIRUB SERPL-MCNC: 0.3 MG/DL (ref 0.2–1.2)
BUN SERPL-MCNC: 19 MG/DL (ref 7–25)
BUN/CREAT SERPL: 12 (CALC) (ref 6–22)
CALCIUM SERPL-MCNC: 9.5 MG/DL (ref 8.6–10.4)
CHLORIDE SERPL-SCNC: 106 MMOL/L (ref 98–110)
CHOLEST SERPL-MCNC: 160 MG/DL
CHOLEST/HDLC SERPL: 3.3 (CALC)
CO2 SERPL-SCNC: 25 MMOL/L (ref 20–32)
CREAT SERPL-MCNC: 1.53 MG/DL (ref 0.6–0.88)
EOSINOPHIL # BLD AUTO: 343 CELLS/UL (ref 15–500)
EOSINOPHIL NFR BLD AUTO: 4.4 %
ERYTHROCYTE [DISTWIDTH] IN BLOOD BY AUTOMATED COUNT: 16.5 % (ref 11–15)
GFRSERPLBLD MDRD-ARVRAT: 31 ML/MIN/1.73M2
GLOBULIN SER CALC-MCNC: 2.9 G/DL (CALC) (ref 1.9–3.7)
GLUCOSE SERPL-MCNC: 96 MG/DL (ref 65–99)
HBA1C MFR BLD: 6 % OF TOTAL HGB
HCT VFR BLD AUTO: 32.7 % (ref 35–45)
HDLC SERPL-MCNC: 48 MG/DL
HGB BLD-MCNC: 10 G/DL (ref 11.7–15.5)
IRON SATN MFR SERPL: 12 % (CALC) (ref 16–45)
IRON SERPL-MCNC: 42 MCG/DL (ref 45–160)
LDLC SERPL CALC-MCNC: 79 MG/DL (CALC)
LYMPHOCYTES # BLD AUTO: 1708 CELLS/UL (ref 850–3900)
LYMPHOCYTES NFR BLD AUTO: 21.9 %
MCH RBC QN AUTO: 24 PG (ref 27–33)
MCHC RBC AUTO-ENTMCNC: 30.6 G/DL (ref 32–36)
MCV RBC AUTO: 78.4 FL (ref 80–100)
MONOCYTES # BLD AUTO: 858 CELLS/UL (ref 200–950)
MONOCYTES NFR BLD AUTO: 11 %
NEUTROPHILS # BLD AUTO: 4859 CELLS/UL (ref 1500–7800)
NEUTROPHILS NFR BLD AUTO: 62.3 %
NONHDLC SERPL-MCNC: 112 MG/DL (CALC)
PLATELET # BLD AUTO: 155 THOUSAND/UL (ref 140–400)
PMV BLD REES-ECKER: 11.1 FL (ref 7.5–12.5)
POTASSIUM SERPL-SCNC: 4.4 MMOL/L (ref 3.5–5.3)
PROT SERPL-MCNC: 6.7 G/DL (ref 6.1–8.1)
RBC # BLD AUTO: 4.17 MILLION/UL (ref 3.8–5.1)
SODIUM SERPL-SCNC: 142 MMOL/L (ref 135–146)
T4 FREE SERPL-MCNC: 1.3 NG/DL (ref 0.8–1.8)
TIBC SERPL-MCNC: 360 MCG/DL (CALC) (ref 250–450)
TRIGL SERPL-MCNC: 251 MG/DL
TSH SERPL-ACNC: 2.18 MIU/L (ref 0.4–4.5)
VIT B12 SERPL-MCNC: 629 PG/ML (ref 200–1100)
WBC # BLD AUTO: 7.8 THOUSAND/UL (ref 3.8–10.8)

## 2020-03-24 ENCOUNTER — OFFICE VISIT (OUTPATIENT)
Dept: INTERNAL MEDICINE | Facility: CLINIC | Age: 85
End: 2020-03-24
Attending: INTERNAL MEDICINE
Payer: MEDICARE

## 2020-03-24 VITALS
TEMPERATURE: 100 F | BODY MASS INDEX: 27.48 KG/M2 | HEART RATE: 111 BPM | DIASTOLIC BLOOD PRESSURE: 87 MMHG | OXYGEN SATURATION: 96 % | SYSTOLIC BLOOD PRESSURE: 147 MMHG | WEIGHT: 140 LBS | HEIGHT: 60 IN

## 2020-03-24 DIAGNOSIS — J45.20 MILD INTERMITTENT ASTHMA WITHOUT COMPLICATION: ICD-10-CM

## 2020-03-24 DIAGNOSIS — Z00.00 ROUTINE ADULT HEALTH MAINTENANCE: ICD-10-CM

## 2020-03-24 DIAGNOSIS — E03.9 ACQUIRED HYPOTHYROIDISM: ICD-10-CM

## 2020-03-24 DIAGNOSIS — D50.9 IRON DEFICIENCY ANEMIA, UNSPECIFIED IRON DEFICIENCY ANEMIA TYPE: Primary | ICD-10-CM

## 2020-03-24 DIAGNOSIS — E11.8 TYPE 2 DIABETES MELLITUS WITH COMPLICATION, WITHOUT LONG-TERM CURRENT USE OF INSULIN: ICD-10-CM

## 2020-03-24 DIAGNOSIS — R05.9 COUGH: ICD-10-CM

## 2020-03-24 DIAGNOSIS — Z13.39 SCREENING FOR ALCOHOLISM: ICD-10-CM

## 2020-03-24 DIAGNOSIS — Z13.31 SCREENING FOR DEPRESSION: ICD-10-CM

## 2020-03-24 DIAGNOSIS — R50.9 FEVER, UNSPECIFIED FEVER CAUSE: ICD-10-CM

## 2020-03-24 DIAGNOSIS — I10 ESSENTIAL HYPERTENSION: ICD-10-CM

## 2020-03-24 PROCEDURE — 3066F PR DOCUMENTATION OF TREATMENT FOR NEPHROPATHY: ICD-10-PCS | Mod: S$GLB,,, | Performed by: INTERNAL MEDICINE

## 2020-03-24 PROCEDURE — 3066F NEPHROPATHY DOC TX: CPT | Mod: S$GLB,,, | Performed by: INTERNAL MEDICINE

## 2020-03-24 PROCEDURE — G0442 ANNUAL ALCOHOL SCREEN 15 MIN: HCPCS | Mod: 59,S$GLB,, | Performed by: INTERNAL MEDICINE

## 2020-03-24 PROCEDURE — 3077F PR MOST RECENT SYSTOLIC BLOOD PRESSURE >= 140 MM HG: ICD-10-PCS | Mod: CPTII,S$GLB,, | Performed by: INTERNAL MEDICINE

## 2020-03-24 PROCEDURE — 96372 PR INJECTION,THERAP/PROPH/DIAG2ST, IM OR SUBCUT: ICD-10-PCS | Mod: S$GLB,,, | Performed by: INTERNAL MEDICINE

## 2020-03-24 PROCEDURE — G0442 PR  ALCOHOL SCREENING: ICD-10-PCS | Mod: 59,S$GLB,, | Performed by: INTERNAL MEDICINE

## 2020-03-24 PROCEDURE — 3077F SYST BP >= 140 MM HG: CPT | Mod: CPTII,S$GLB,, | Performed by: INTERNAL MEDICINE

## 2020-03-24 PROCEDURE — 99214 OFFICE O/P EST MOD 30 MIN: CPT | Mod: 25,S$GLB,, | Performed by: INTERNAL MEDICINE

## 2020-03-24 PROCEDURE — 99214 PR OFFICE/OUTPT VISIT, EST, LEVL IV, 30-39 MIN: ICD-10-PCS | Mod: 25,S$GLB,, | Performed by: INTERNAL MEDICINE

## 2020-03-24 PROCEDURE — 3079F DIAST BP 80-89 MM HG: CPT | Mod: CPTII,S$GLB,, | Performed by: INTERNAL MEDICINE

## 2020-03-24 PROCEDURE — 3079F PR MOST RECENT DIASTOLIC BLOOD PRESSURE 80-89 MM HG: ICD-10-PCS | Mod: CPTII,S$GLB,, | Performed by: INTERNAL MEDICINE

## 2020-03-24 PROCEDURE — 1160F PR REVIEW ALL MEDS BY PRESCRIBER/CLIN PHARMACIST DOCUMENTED: ICD-10-PCS | Mod: S$GLB,,, | Performed by: INTERNAL MEDICINE

## 2020-03-24 PROCEDURE — 1160F RVW MEDS BY RX/DR IN RCRD: CPT | Mod: S$GLB,,, | Performed by: INTERNAL MEDICINE

## 2020-03-24 PROCEDURE — 1159F MED LIST DOCD IN RCRD: CPT | Mod: S$GLB,,, | Performed by: INTERNAL MEDICINE

## 2020-03-24 PROCEDURE — 1159F PR MEDICATION LIST DOCUMENTED IN MEDICAL RECORD: ICD-10-PCS | Mod: S$GLB,,, | Performed by: INTERNAL MEDICINE

## 2020-03-24 PROCEDURE — 96372 THER/PROPH/DIAG INJ SC/IM: CPT | Mod: S$GLB,,, | Performed by: INTERNAL MEDICINE

## 2020-03-24 RX ORDER — AZITHROMYCIN 250 MG/1
TABLET, FILM COATED ORAL
Qty: 6 TABLET | Refills: 0 | Status: SHIPPED | OUTPATIENT
Start: 2020-03-24 | End: 2020-05-21

## 2020-03-24 RX ORDER — METHYLPREDNISOLONE ACETATE 80 MG/ML
80 INJECTION, SUSPENSION INTRA-ARTICULAR; INTRALESIONAL; INTRAMUSCULAR; SOFT TISSUE ONCE
Status: COMPLETED | OUTPATIENT
Start: 2020-03-24 | End: 2020-03-24

## 2020-03-24 RX ORDER — TRIAMCINOLONE ACETONIDE 40 MG/ML
40 INJECTION, SUSPENSION INTRA-ARTICULAR; INTRAMUSCULAR ONCE
Status: COMPLETED | OUTPATIENT
Start: 2020-03-24 | End: 2020-03-24

## 2020-03-24 RX ORDER — FLUTICASONE FUROATE AND VILANTEROL 100; 25 UG/1; UG/1
1 POWDER RESPIRATORY (INHALATION) DAILY
COMMUNITY
End: 2020-12-10

## 2020-03-24 RX ADMIN — METHYLPREDNISOLONE ACETATE 80 MG: 80 INJECTION, SUSPENSION INTRA-ARTICULAR; INTRALESIONAL; INTRAMUSCULAR; SOFT TISSUE at 03:03

## 2020-03-24 RX ADMIN — TRIAMCINOLONE ACETONIDE 40 MG: 40 INJECTION, SUSPENSION INTRA-ARTICULAR; INTRAMUSCULAR at 03:03

## 2020-03-24 NOTE — PROGRESS NOTES
Subjective:       Patient ID: Ashely Holman is a 85 y.o. female.    Chief Complaint: Follow-up (4 month); Fever; and Cough    She has had a cough for the past 3 weeks.  It is usually nonproductive, but occasionally she coughs up yellow sputum.  She uses Breo and albuterol nebulizers, which helped.  She has not had any known exposure to Covid-19.  Her daughter has stayed home with her and worked from home for the past 3 weeks.    Fever    This is a new problem. The current episode started in the past 7 days. Associated symptoms include coughing. Pertinent negatives include no chest pain.   Cough   This is a recurrent problem. The current episode started 1 to 4 weeks ago. The cough is productive of sputum. Associated symptoms include a fever. Pertinent negatives include no chest pain or shortness of breath.   Diabetes   She presents for her follow-up diabetic visit. She has type 2 diabetes mellitus. Her disease course has been stable. Pertinent negatives for diabetes include no chest pain.   Hypertension   This is a chronic problem. The current episode started more than 1 year ago. The problem is controlled. Pertinent negatives include no chest pain or shortness of breath.     Review of Systems   Constitutional: Positive for fever.   Respiratory: Positive for cough. Negative for shortness of breath.    Cardiovascular: Negative for chest pain.   Psychiatric/Behavioral: Negative for dysphoric mood.       Objective:      Physical Exam   Constitutional: She appears well-developed and well-nourished.   HENT:   Head: Normocephalic.   Eyes: Pupils are equal, round, and reactive to light.   Cardiovascular: Normal rate, regular rhythm and normal heart sounds.   Pulmonary/Chest: Effort normal.   Neurological: She is alert.   Nursing note and vitals reviewed.      Assessment:       1. Iron deficiency anemia, unspecified iron deficiency anemia type    2. Acquired hypothyroidism    3. Type 2 diabetes mellitus with  complication, without long-term current use of insulin    4. Mild intermittent asthma without complication    5. Essential hypertension    6. Cough    7. Fever, unspecified fever cause        Plan:       Per orders and D/C instructions.  Continue meds/diet for DM, HTN, anemia, and hypothyroid, which are stable.     steroid shot and Z-Shamar for asthma exacerbation.  Continue Breo and albuterol nebs.  Low suspicion for covid-19 due to minimal exposure.    Screening: The patient was screened for depression with the PHQ2 questionnaire and possible health consequences were discussed with the patient, who understands (15 minutes spent). The patient was screened for the misuse of alcohol, by asking the number of drinks per average week, and if pt has had more than 4 drinks (more than 3 for women and elderly) in 1 day within the past year. The health and legal consequences of misuse were discussed (15 minutes spent). The patient was screened for obesity (BMI>30), If the current BMI > 30, then the possible consequences of obesity, as well as the benefits of diet, exercise, and weight loss were discussed. Any behavioral risks were identified, and methods to achieve appropriate treatment goals were discussed (15 minutes spent).

## 2020-03-29 RX ORDER — LOSARTAN POTASSIUM 100 MG/1
TABLET ORAL
Qty: 90 TABLET | Refills: 3 | Status: SHIPPED | OUTPATIENT
Start: 2020-03-29 | End: 2021-07-06 | Stop reason: SDUPTHER

## 2020-05-21 ENCOUNTER — OFFICE VISIT (OUTPATIENT)
Dept: URGENT CARE | Facility: CLINIC | Age: 85
End: 2020-05-21
Payer: MEDICARE

## 2020-05-21 ENCOUNTER — HOSPITAL ENCOUNTER (EMERGENCY)
Facility: OTHER | Age: 85
Discharge: HOME | End: 2020-05-21
Attending: EMERGENCY MEDICINE
Payer: MEDICARE

## 2020-05-21 VITALS
TEMPERATURE: 99 F | RESPIRATION RATE: 18 BRPM | DIASTOLIC BLOOD PRESSURE: 70 MMHG | OXYGEN SATURATION: 99 % | SYSTOLIC BLOOD PRESSURE: 154 MMHG | BODY MASS INDEX: 27.48 KG/M2 | HEART RATE: 76 BPM | WEIGHT: 140 LBS | HEIGHT: 60 IN

## 2020-05-21 VITALS
BODY MASS INDEX: 27.48 KG/M2 | TEMPERATURE: 99 F | HEART RATE: 71 BPM | HEIGHT: 60 IN | OXYGEN SATURATION: 90 % | WEIGHT: 140 LBS

## 2020-05-21 DIAGNOSIS — R06.02 SHORTNESS OF BREATH: ICD-10-CM

## 2020-05-21 DIAGNOSIS — J44.1 COPD WITH ACUTE EXACERBATION: Primary | ICD-10-CM

## 2020-05-21 DIAGNOSIS — R09.02 HYPOXIA: Primary | ICD-10-CM

## 2020-05-21 LAB
POCT GLUCOSE: 114 MG/DL (ref 70–110)
SARS-COV-2 RDRP RESP QL NAA+PROBE: NEGATIVE

## 2020-05-21 PROCEDURE — 82962 GLUCOSE BLOOD TEST: CPT

## 2020-05-21 PROCEDURE — 63600175 PHARM REV CODE 636 W HCPCS: Performed by: EMERGENCY MEDICINE

## 2020-05-21 PROCEDURE — 99284 EMERGENCY DEPT VISIT MOD MDM: CPT | Mod: 25

## 2020-05-21 PROCEDURE — 99214 PR OFFICE/OUTPT VISIT, EST, LEVL IV, 30-39 MIN: ICD-10-PCS | Mod: S$GLB,,, | Performed by: FAMILY MEDICINE

## 2020-05-21 PROCEDURE — 99214 OFFICE O/P EST MOD 30 MIN: CPT | Mod: S$GLB,,, | Performed by: FAMILY MEDICINE

## 2020-05-21 PROCEDURE — U0002 COVID-19 LAB TEST NON-CDC: HCPCS

## 2020-05-21 PROCEDURE — 94640 AIRWAY INHALATION TREATMENT: CPT

## 2020-05-21 PROCEDURE — 94761 N-INVAS EAR/PLS OXIMETRY MLT: CPT

## 2020-05-21 PROCEDURE — 25000242 PHARM REV CODE 250 ALT 637 W/ HCPCS: Performed by: EMERGENCY MEDICINE

## 2020-05-21 RX ORDER — PREDNISONE 20 MG/1
60 TABLET ORAL
Status: COMPLETED | OUTPATIENT
Start: 2020-05-21 | End: 2020-05-21

## 2020-05-21 RX ORDER — PREDNISONE 20 MG/1
60 TABLET ORAL DAILY
Qty: 15 TABLET | Refills: 0 | Status: SHIPPED | OUTPATIENT
Start: 2020-05-21 | End: 2020-05-26

## 2020-05-21 RX ORDER — IPRATROPIUM BROMIDE AND ALBUTEROL SULFATE 2.5; .5 MG/3ML; MG/3ML
3 SOLUTION RESPIRATORY (INHALATION) EVERY 4 HOURS PRN
Qty: 1 BOX | Refills: 0 | Status: SHIPPED | OUTPATIENT
Start: 2020-05-21 | End: 2021-02-01

## 2020-05-21 RX ORDER — LEVOTHYROXINE SODIUM 88 UG/1
88 TABLET ORAL
COMMUNITY
End: 2020-12-10

## 2020-05-21 RX ORDER — IPRATROPIUM BROMIDE AND ALBUTEROL SULFATE 2.5; .5 MG/3ML; MG/3ML
3 SOLUTION RESPIRATORY (INHALATION)
Status: COMPLETED | OUTPATIENT
Start: 2020-05-21 | End: 2020-05-21

## 2020-05-21 RX ADMIN — IPRATROPIUM BROMIDE AND ALBUTEROL SULFATE 3 ML: .5; 3 SOLUTION RESPIRATORY (INHALATION) at 09:05

## 2020-05-21 RX ADMIN — PREDNISONE 60 MG: 20 TABLET ORAL at 08:05

## 2020-05-21 NOTE — PROGRESS NOTES
Subjective:       Patient ID: Ashely Holman is a 86 y.o. female.    Vitals:  height is 5' (1.524 m) and weight is 63.5 kg (140 lb). Her temperature is 99.1 °F (37.3 °C). Her pulse is 71. Her oxygen saturation is 90% (abnormal).     Chief Complaint: Shortness of Breath    Pt c/o shortness of breath and cough,pt has a history of COPD, pt was seen last month by her doctor and given a steroid shot, pt felt better but started feeling bad again two weeks ago     Shortness of Breath   This is a new problem. Episode onset: two weeks. The problem has been gradually worsening. Pertinent negatives include no ear pain, fever, hemoptysis, rash, sore throat, sputum production, vomiting or wheezing. The symptoms are aggravated by weather changes. Risk factors: COPD. Treatments tried: steroid shot. The treatment provided mild relief. Her past medical history is significant for COPD.       Constitution: Negative for chills, sweating, fatigue and fever.   HENT: Negative for ear pain, congestion, sinus pain, sinus pressure, sore throat and voice change.    Neck: Negative for painful lymph nodes.   Eyes: Negative for eye redness.   Respiratory: Positive for cough, COPD and shortness of breath. Negative for chest tightness, sputum production, bloody sputum, stridor, wheezing and asthma.    Gastrointestinal: Negative for nausea and vomiting.   Musculoskeletal: Negative for muscle ache.   Skin: Negative for rash.   Allergic/Immunologic: Negative for seasonal allergies and asthma.   Hematologic/Lymphatic: Negative for swollen lymph nodes.       Objective:      Physical Exam   Constitutional: She appears well-developed and well-nourished.   HENT:   Head: Normocephalic and atraumatic.   Pulmonary/Chest: No respiratory distress. She has wheezes (audible across the room).   Nursing note and vitals reviewed.        Assessment:       1. Hypoxia      will defer treatment for possible COPD until we can document negative COVID-19. Will hold  albuterol neb and steroids until rapid testing in the ER. Discussed with daughter who verbalized understanding and agreement with plan. Declines ambulance transfer.  Plan:         Hypoxia  -     Refer to Emergency Dept.

## 2020-05-21 NOTE — PATIENT INSTRUCTIONS
COPD Flare    You have had a flare-up of your COPD.  COPD, or chronic obstructive pulmonary disease, is a common lung disease. It causes your airways to become irritated and narrower. This makes it harder for you to breathe. Emphysema and chronic bronchitis are both types of COPD. This is a chronic condition, which means you always have it. Sometimes it gets worse. When this happens, it is called a flare-up.  Symptoms of COPD  People with COPD may have symptoms most of the time. In a flare-up, your symptoms get worse. These symptoms may mean you are having a flare-up:  · Shortness of breath, shallow or rapid breathing, or wheezing that gets worse  · Lung infection  · Cough that gets worse  · More mucus, thicker mucus or mucus of a different color  · Tiredness, decreased energy, or trouble doing your usual activities  · Fever  · Chest tightness  · Your symptoms dont get better even when you use your usual medicines, inhalers, and nebulizer  · Trouble talking  · You feel confused  Causes of flare-ups  Unfortunately, a flare-up can happen even though you did everything right, and you followed your doctors instructions. Some causes of flare-ups are:  · Smoking or secondhand smoke  · Colds, the flu, or respiratory infections  · Air pollution  · Sudden change in the weather  · Dust, irritating chemicals, or strong fumes  · Not taking your medicines as prescribed  Home care  Here are some things you can do at home to treat a flare-up:  · Try not to panic. This makes it harder to breathe, and keeps you from doing the right things.  · Dont smoke or be around others who are smoking.  · Try to drink more fluids than usual during a flare-up, unless your doctor has told you not to because of heart and kidney problems. More fluids can help loosen the mucus.  · Use your inhalers and nebulizer, if you have one, as you have been told to.  · If you were given antibiotics, take them until they are used up or your doctor tells you  to stop. Its important to finish the antibiotics, even though you feel better. This will make sure the infection has cleared.  · If you were given prednisone or another steroid, finish it even if you feel better.  Preventing a flare-up  Even though flare-ups happen, the best way to treat one is to prevent it before it starts. Here are some pointers:  · Dont smoke or be around others who are smoking.  · Take your medicines as you have been told.  · Talk with your doctor about getting a flu shot every year. Also find out if you need a pneumonia shot.  · If there is a weather advisory warning to stay indoors, try to stay inside when possible.  · Try to eat healthy and get plenty of sleep.  · Try to avoid things that usually set you off, like dust, chemical fumes, hairsprays, or strong perfumes.  Follow-up care  Follow up with your healthcare provider, or as advised.  If a culture was done, you will be told if your treatment needs to be changed. You can call as directed for the results.  If X-rays were done, you will be notified of any new findings that may affect your care.  Call 911  Call 911 if any of these occur:  · You have trouble breathing  · You feel confused or its difficult to wake you up  · You faint or lose consciousness  · You have a rapid heart rate  · You have new pain in your chest, arm, shoulder, neck or upper back  When to seek medical advice  Call your healthcare provider right away if any of these occur:  · Wheezing or shortness of breath gets worse  · You need to use your inhalers more often than usual without relief  · Fever of 100.4°F (38ºC) or higher, or as directed by your healthcare provider  · Coughing up lots of dark-colored or bloody mucus (sputum)  · Chest pain with each breath  · You do not start to get better within 24 hours  · Swelling of your ankles gets worse  · Dizziness or weakness  Date Last Reviewed: 9/1/2016  © 9724-4026 [employer]. [address] [facility],  [place] [address]. All rights reserved. This information is not intended as a substitute for professional medical care. Always follow your healthcare professional's instructions.

## 2020-05-22 NOTE — ED PROVIDER NOTES
"Encounter Date: 5/21/2020    SCRIBE #1 NOTE: I, Chana Neely, am scribing for, and in the presence of, Dr. Pike.       History     Chief Complaint   Patient presents with    COVID-19 Concerns     Pateint was seen at urgent care for COPD symptoms doctor requested COVID test based on symptoms      Time seen by provider: 7:51 PM    This is a 86 y.o. female with hx HTN, DM, and COPD who was sent to the ED from Urgent Care for COVID-19 rule out. Per daughter, she has had progressively worsening COPD for years. She uses Breo inhaler daily and nebulizer for rescue normally. She was seen by her PCP, Dr. Badillo, about one month ago for increased shortness of breath and was given "a steroid shot and a Z-pack" with improvement. She had worsening shortness of breath today and used her nebulizer twice without relief. She went to Urgent Care for this and had SpO2 90%. Per daughter, patient was not examined and was told on the phone to go to the ED for COVID-19 testing. Daughter also reports clear rhinorrhea, worsening nonproductive cough, and chest pain when coughing. Daughter denies fever, chills, congestion, sore throat, nausea, vomiting, or diarrhea. She requests nebulizer refill. She notes diabetes is well-controlled with metformin. This is the extent of the patient's complaints at this time.    The history is provided by the patient, a relative and medical records. The history is limited by a language barrier. A  was used (daughter).     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 Garcia.    DM2 (diabetes mellitus, type 2) 2/25/2014    " Encounter for blood transfusion     Glaucoma     Hypertension     Iron deficiency anemia 2/25/2014    Dr. 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 Ocasio    Post herpetic neuralgia 2/25/2014    Left thigh Dr. Huang    Solitary lung nodule 4/20/2014    Right LL - 4 mm. CT 2007, 2008. CXR 4/14.    Stroke     2015    Thyroid disease      Past Surgical History:   Procedure Laterality Date    CATARACT EXTRACTION W/  INTRAOCULAR LENS IMPLANT  2013    left eye    CHOLECYSTECTOMY  09/2016    right thyroidectomy  2004     Family History   Problem Relation Age of Onset    Hypertension Mother     Arthritis Father     Hypertension Father     Stroke Father     Hypertension Sister     Alcohol abuse Brother     Cancer Brother         colon    Hypertension Brother     Asthma Daughter     Hypertension Daughter     Arthritis Maternal Aunt     Asthma Paternal Grandmother      Social History     Tobacco Use    Smoking status: Never Smoker    Smokeless tobacco: Never Used   Substance Use Topics    Alcohol use: No    Drug use: No     Review of Systems   Constitutional: Negative for chills and fever.   HENT: Positive for rhinorrhea. Negative for congestion and sore throat.    Eyes: Negative for visual disturbance.   Respiratory: Positive for cough and shortness of breath.    Cardiovascular: Positive for chest pain. Negative for palpitations.   Gastrointestinal: Negative for abdominal pain, diarrhea, nausea and vomiting.   Genitourinary: Negative for decreased urine volume, difficulty urinating, dysuria, frequency, urgency and vaginal discharge.   Musculoskeletal: Negative for joint swelling, neck pain and neck stiffness.   Skin: Negative for rash and wound.   Neurological: Negative for weakness, numbness and headaches.   Psychiatric/Behavioral: Negative for confusion.       Physical Exam     Initial Vitals [05/21/20 1937]   BP Pulse Resp Temp  SpO2   (!) 140/66 96 20 98.6 °F (37 °C) 98 %      MAP       --         Physical Exam    Nursing note and vitals reviewed.  Constitutional: She appears well-developed and well-nourished. She is not diaphoretic. No distress.   HENT:   Head: Normocephalic and atraumatic.   Eyes: Conjunctivae and EOM are normal. Pupils are equal, round, and reactive to light. No scleral icterus.   Neck: Normal range of motion. Neck supple.   Cardiovascular: Normal rate, regular rhythm and normal heart sounds. Exam reveals no gallop and no friction rub.    No murmur heard.  Pulmonary/Chest: No respiratory distress. She has wheezes (expiratory throughout). She has no rhonchi. She has no rales.   Good air movement.   Abdominal: Soft. There is no tenderness. There is no rebound and no guarding.   Musculoskeletal: Normal range of motion. She exhibits no edema or tenderness.   Neurological: She is alert and oriented to person, place, and time. She has normal strength. GCS score is 15. GCS eye subscore is 4. GCS verbal subscore is 5. GCS motor subscore is 6.   Skin: Skin is warm and dry.   Psychiatric: She has a normal mood and affect. Thought content normal.         ED Course   Procedures  Labs Reviewed   SARS-COV-2 RNA AMPLIFICATION, QUAL    Narrative:     What symptom criteria does the patient meet?->Shortness of  breath or difficulty breathing   POCT GLUCOSE MONITORING CONTINUOUS          Imaging Results          X-Ray Chest AP Portable (Final result)  Result time 05/21/20 20:23:38    Final result by Matt Hemphill MD (05/21/20 20:23:38)                 Impression:      No detrimental change or radiographic evidence of pneumonia or other source of shortness of breath, noting that early/mild viral pneumonia may be radiographically occult.  Specifically, no large consolidation or new focal opacity.    Grossly similar chronic findings as above.      Electronically signed by: Matt Hemphill MD  Date:    05/21/2020  Time:    20:23              Narrative:    EXAMINATION:  XR CHEST AP PORTABLE    CLINICAL HISTORY:  Shortness of breath    TECHNIQUE:  Single frontal view of the chest was performed.    COMPARISON:  Chest radiograph 06/12/2019 and CT abdomen and pelvis 09/28/2016    FINDINGS:  No detrimental change.  Cardiomediastinal silhouette is midline and prominent similar to prior without evidence of failure.  Prominent senescent costochondral calcifications, slightly more so on the right again noted.  Few scattered linear opacities consistent with platelike scarring versus atelectasis and grossly similar several scattered small nodular densities at the right lung base.  The lungs are otherwise symmetrically well expanded without large consolidation or new focal opacity.  No pleural effusion or pneumothorax.  No acute osseous process seen.  PA and lateral views can be obtained.                              X-Rays:   Independently Interpreted Readings:   Chest X-Ray: Chronic appearing interstitial changes are present. No obvious effusion or pneumothorax. Will defer to radiology.     Medical Decision Making:   History:   Old Medical Records: I decided to obtain old medical records.  Independently Interpreted Test(s):   I have ordered and independently interpreted X-rays - see prior notes.  Clinical Tests:   Lab Tests: Ordered and Reviewed  Radiological Study: Ordered and Reviewed  ED Management:  Emergent evaluation of 86-year-old female with COPD who presents with complaint of worsening shortness of breath.  Vital signs are benign, afebrile.  She was sent here from urgent care with reported hypoxia, here room air pulse oximetry is %.  On exam she is in no respiratory distress, there faint expiratory wheezes in all lung fields.  COVID testing is negative.  Chest x-ray is clear with no pneumonia.  She is treated with oral prednisone and DuoNebs with improvement.  She is discharged home in good condition with prescription for prednisone burst and refill  on nebulizer.  She is encouraged to return for new or worsening symptoms and otherwise follow closely with her PCP.  Patient and her daughter are advised that steroid treatment will elevate blood sugars, patient is non-insulin-dependent diabetic.  They state they will monitor blood sugar closely and follow diabetic diet.            Scribe Attestation:   Scribe #1: I performed the above scribed service and the documentation accurately describes the services I performed. I attest to the accuracy of the note.    Attending Attestation:           Physician Attestation for Scribe:  Physician Attestation Statement for Scribe #1: I, Dr. Pike, reviewed documentation, as scribed by Chana Neely in my presence, and it is both accurate and complete.                               Clinical Impression:     1. COPD with acute exacerbation    2. Shortness of breath              ED Disposition Condition    Discharge Stable        ED Prescriptions     Medication Sig Dispense Start Date End Date Auth. Provider    predniSONE (DELTASONE) 20 MG tablet Take 3 tablets (60 mg total) by mouth once daily. for 5 days 15 tablet 5/21/2020 5/26/2020 Dina Pike MD    albuterol-ipratropium (DUO-NEB) 2.5 mg-0.5 mg/3 mL nebulizer solution Take 3 mLs by nebulization every 4 (four) hours as needed for Wheezing. Rescue 1 Box 5/21/2020 5/21/2021 Dina Pike MD        Follow-up Information     Follow up With Specialties Details Why Contact Info    ANURADHA Badillo MD Internal Medicine Schedule an appointment as soon as possible for a visit   [address]  [address]  [facility] [address]  [phone]      [facility]  As needed, If symptoms worsen [address] [facility] [id number] [phone]                                     Dina Pike MD  05/21/20 3099

## 2020-05-22 NOTE — ED TRIAGE NOTES
"Pt presents to ED from urgent care with c/o "fever and hypoxia". Pt reports worsening cough and SOB. Pt states she has hx of COPD x20yrs and "my symptoms always flare up around this time of year". Pt's temp and pulse ox WDL upon arrival to ED. Reports taking steroids and inhaler at home with no relief. Denies chest pain, chills, abdominal pain, N/V/D, dysuria, and dizziness  "

## 2020-07-27 DIAGNOSIS — E03.9 HYPOTHYROIDISM, UNSPECIFIED TYPE: ICD-10-CM

## 2020-07-27 DIAGNOSIS — D51.0 PERNICIOUS ANEMIA: ICD-10-CM

## 2020-07-27 DIAGNOSIS — E78.9 DISORDER OF LIPID METABOLISM: ICD-10-CM

## 2020-07-27 DIAGNOSIS — D50.8 OTHER IRON DEFICIENCY ANEMIA: ICD-10-CM

## 2020-07-27 DIAGNOSIS — R79.89 OTHER SPECIFIED ABNORMAL FINDINGS OF BLOOD CHEMISTRY: ICD-10-CM

## 2020-07-27 DIAGNOSIS — E55.9 VITAMIN D DEFICIENCY: ICD-10-CM

## 2020-12-10 ENCOUNTER — OFFICE VISIT (OUTPATIENT)
Dept: INTERNAL MEDICINE | Facility: CLINIC | Age: 85
End: 2020-12-10
Attending: INTERNAL MEDICINE
Payer: MEDICARE

## 2020-12-10 VITALS — DIASTOLIC BLOOD PRESSURE: 89 MMHG | SYSTOLIC BLOOD PRESSURE: 135 MMHG

## 2020-12-10 DIAGNOSIS — R05.9 COUGH: ICD-10-CM

## 2020-12-10 DIAGNOSIS — E11.22 TYPE 2 DIABETES MELLITUS WITH STAGE 3A CHRONIC KIDNEY DISEASE, WITHOUT LONG-TERM CURRENT USE OF INSULIN: Primary | ICD-10-CM

## 2020-12-10 DIAGNOSIS — E03.9 ACQUIRED HYPOTHYROIDISM: ICD-10-CM

## 2020-12-10 DIAGNOSIS — N18.31 STAGE 3A CHRONIC KIDNEY DISEASE: ICD-10-CM

## 2020-12-10 DIAGNOSIS — J45.20 MILD INTERMITTENT ASTHMA WITHOUT COMPLICATION: ICD-10-CM

## 2020-12-10 DIAGNOSIS — J44.1 OBSTRUCTIVE CHRONIC BRONCHITIS WITH EXACERBATION: ICD-10-CM

## 2020-12-10 DIAGNOSIS — N18.31 TYPE 2 DIABETES MELLITUS WITH STAGE 3A CHRONIC KIDNEY DISEASE, WITHOUT LONG-TERM CURRENT USE OF INSULIN: Primary | ICD-10-CM

## 2020-12-10 DIAGNOSIS — I10 ESSENTIAL HYPERTENSION: ICD-10-CM

## 2020-12-10 PROCEDURE — 1159F MED LIST DOCD IN RCRD: CPT | Mod: S$GLB,,, | Performed by: INTERNAL MEDICINE

## 2020-12-10 PROCEDURE — 99213 OFFICE O/P EST LOW 20 MIN: CPT | Mod: 95,,, | Performed by: INTERNAL MEDICINE

## 2020-12-10 PROCEDURE — 99213 PR OFFICE/OUTPT VISIT, EST, LEVL III, 20-29 MIN: ICD-10-PCS | Mod: 95,,, | Performed by: INTERNAL MEDICINE

## 2020-12-10 PROCEDURE — 1159F PR MEDICATION LIST DOCUMENTED IN MEDICAL RECORD: ICD-10-PCS | Mod: S$GLB,,, | Performed by: INTERNAL MEDICINE

## 2020-12-10 RX ORDER — FLUTICASONE FUROATE AND VILANTEROL TRIFENATATE 200; 25 UG/1; UG/1
1 POWDER RESPIRATORY (INHALATION) DAILY
Qty: 60 EACH | Refills: 11 | Status: SHIPPED | OUTPATIENT
Start: 2020-12-10 | End: 2021-08-24 | Stop reason: SDUPTHER

## 2020-12-10 RX ORDER — NEBULIZER AND COMPRESSOR
1 EACH MISCELLANEOUS 3 TIMES DAILY PRN
Qty: 1 EACH | Refills: 0 | Status: SHIPPED | OUTPATIENT
Start: 2020-12-10 | End: 2021-02-01

## 2020-12-10 RX ORDER — CODEINE PHOSPHATE AND GUAIFENESIN 10; 100 MG/5ML; MG/5ML
5 SOLUTION ORAL NIGHTLY PRN
Qty: 118 ML | Refills: 0 | Status: SHIPPED | OUTPATIENT
Start: 2020-12-10 | End: 2020-12-20

## 2020-12-10 RX ORDER — METHYLPREDNISOLONE 4 MG/1
TABLET ORAL
Qty: 1 PACKAGE | Refills: 0 | Status: SHIPPED | OUTPATIENT
Start: 2020-12-10 | End: 2021-02-01

## 2020-12-10 RX ORDER — DOXYCYCLINE HYCLATE 100 MG
100 TABLET ORAL 2 TIMES DAILY
Qty: 14 TABLET | Refills: 0 | Status: SHIPPED | OUTPATIENT
Start: 2020-12-10 | End: 2021-02-01

## 2020-12-10 NOTE — PROGRESS NOTES
Telemedicine Visit    The patient verbally consented to proceed with a telemedicine visit, following discussion of the options of face-to-face or telemedicine visit. The telemedicine visit was completed with real-time interactive audio and video using [email or web address] without complication.    The visit was conducted with a limited physical exam (visual exam), and was medically appropriate to meet the patient's needs.    The patient is at their home.  The patient appeared to be in the usual physical state, with normal responses and affect.  I did not notice any abnormal skin tone or abnormal breathing patterns.    I requested that the patient contact my office at [phone] to make an appointment for routine follow-up care, unless more specific instructions were given under A/P.    Chief Complaint: cough    HPI:  For the past several days she has had an increase in her cough.  It is nonproductive.  Her chest feels tight.  Her nebulizer is broken.  Her daughter feels like she is not getting most of the Breo and albuterol into her lungs.  She denies any fever.        A/P:  Per orders and D/C instructions.  Continue meds/diet for DM, HTN, and hypothyroid, which are stable.  Will try to get her a new nebulizer machine.  She will use albuterol nebulizers for her asthma exacerbation.  She use her Breo every morning following an albuterol nebulizer.  Doxycycline, Medrol Dosepak and Robitussin AC for asthma exacerbation.  She will call the office next week and get an appointment.  She would like to get her flu shot at that time.

## 2021-02-01 ENCOUNTER — OFFICE VISIT (OUTPATIENT)
Dept: INTERNAL MEDICINE | Facility: CLINIC | Age: 86
End: 2021-02-01
Attending: INTERNAL MEDICINE
Payer: MEDICARE

## 2021-02-01 VITALS
SYSTOLIC BLOOD PRESSURE: 122 MMHG | HEIGHT: 60 IN | HEART RATE: 84 BPM | BODY MASS INDEX: 26.9 KG/M2 | WEIGHT: 137 LBS | DIASTOLIC BLOOD PRESSURE: 82 MMHG | OXYGEN SATURATION: 98 %

## 2021-02-01 DIAGNOSIS — Z13.31 SCREENING FOR DEPRESSION: ICD-10-CM

## 2021-02-01 DIAGNOSIS — E03.9 ACQUIRED HYPOTHYROIDISM: ICD-10-CM

## 2021-02-01 DIAGNOSIS — E11.40 TYPE 2 DIABETES MELLITUS WITH DIABETIC NEUROPATHY, WITHOUT LONG-TERM CURRENT USE OF INSULIN: ICD-10-CM

## 2021-02-01 DIAGNOSIS — E11.22 CONTROLLED TYPE 2 DIABETES MELLITUS WITH STAGE 3 CHRONIC KIDNEY DISEASE, WITHOUT LONG-TERM CURRENT USE OF INSULIN: ICD-10-CM

## 2021-02-01 DIAGNOSIS — M81.0 OSTEOPOROSIS, POST-MENOPAUSAL: ICD-10-CM

## 2021-02-01 DIAGNOSIS — N18.31 STAGE 3A CHRONIC KIDNEY DISEASE: ICD-10-CM

## 2021-02-01 DIAGNOSIS — N18.30 CONTROLLED TYPE 2 DIABETES MELLITUS WITH STAGE 3 CHRONIC KIDNEY DISEASE, WITHOUT LONG-TERM CURRENT USE OF INSULIN: ICD-10-CM

## 2021-02-01 DIAGNOSIS — I10 ESSENTIAL HYPERTENSION: ICD-10-CM

## 2021-02-01 DIAGNOSIS — N18.31 TYPE 2 DIABETES MELLITUS WITH STAGE 3A CHRONIC KIDNEY DISEASE, WITHOUT LONG-TERM CURRENT USE OF INSULIN: Primary | ICD-10-CM

## 2021-02-01 DIAGNOSIS — D50.9 IRON DEFICIENCY ANEMIA, UNSPECIFIED IRON DEFICIENCY ANEMIA TYPE: ICD-10-CM

## 2021-02-01 DIAGNOSIS — J45.20 MILD INTERMITTENT ASTHMA WITHOUT COMPLICATION: ICD-10-CM

## 2021-02-01 DIAGNOSIS — E11.22 TYPE 2 DIABETES MELLITUS WITH STAGE 3A CHRONIC KIDNEY DISEASE, WITHOUT LONG-TERM CURRENT USE OF INSULIN: Primary | ICD-10-CM

## 2021-02-01 DIAGNOSIS — Z00.00 ROUTINE ADULT HEALTH MAINTENANCE: ICD-10-CM

## 2021-02-01 DIAGNOSIS — Z13.39 SCREENING FOR ALCOHOLISM: ICD-10-CM

## 2021-02-01 PROCEDURE — 1159F PR MEDICATION LIST DOCUMENTED IN MEDICAL RECORD: ICD-10-PCS | Mod: S$GLB,,, | Performed by: INTERNAL MEDICINE

## 2021-02-01 PROCEDURE — G0008 ADMIN INFLUENZA VIRUS VAC: HCPCS | Mod: S$GLB,,, | Performed by: INTERNAL MEDICINE

## 2021-02-01 PROCEDURE — 99214 PR OFFICE/OUTPT VISIT, EST, LEVL IV, 30-39 MIN: ICD-10-PCS | Mod: 25,S$GLB,, | Performed by: INTERNAL MEDICINE

## 2021-02-01 PROCEDURE — 1160F PR REVIEW ALL MEDS BY PRESCRIBER/CLIN PHARMACIST DOCUMENTED: ICD-10-PCS | Mod: S$GLB,,, | Performed by: INTERNAL MEDICINE

## 2021-02-01 PROCEDURE — 3066F PR DOCUMENTATION OF TREATMENT FOR NEPHROPATHY: ICD-10-PCS | Mod: S$GLB,,, | Performed by: INTERNAL MEDICINE

## 2021-02-01 PROCEDURE — 1160F RVW MEDS BY RX/DR IN RCRD: CPT | Mod: S$GLB,,, | Performed by: INTERNAL MEDICINE

## 2021-02-01 PROCEDURE — 90694 VACC AIIV4 NO PRSRV 0.5ML IM: CPT | Mod: S$GLB,,, | Performed by: INTERNAL MEDICINE

## 2021-02-01 PROCEDURE — G0008 FLU VACCINE - QUADRIVALENT - ADJUVANTED: ICD-10-PCS | Mod: S$GLB,,, | Performed by: INTERNAL MEDICINE

## 2021-02-01 PROCEDURE — G0442 PR  ALCOHOL SCREENING: ICD-10-PCS | Mod: S$GLB,,, | Performed by: INTERNAL MEDICINE

## 2021-02-01 PROCEDURE — 1159F MED LIST DOCD IN RCRD: CPT | Mod: S$GLB,,, | Performed by: INTERNAL MEDICINE

## 2021-02-01 PROCEDURE — 99214 OFFICE O/P EST MOD 30 MIN: CPT | Mod: 25,S$GLB,, | Performed by: INTERNAL MEDICINE

## 2021-02-01 PROCEDURE — 90694 FLU VACCINE - QUADRIVALENT - ADJUVANTED: ICD-10-PCS | Mod: S$GLB,,, | Performed by: INTERNAL MEDICINE

## 2021-02-01 PROCEDURE — 3066F NEPHROPATHY DOC TX: CPT | Mod: S$GLB,,, | Performed by: INTERNAL MEDICINE

## 2021-02-01 PROCEDURE — G0442 ANNUAL ALCOHOL SCREEN 15 MIN: HCPCS | Mod: S$GLB,,, | Performed by: INTERNAL MEDICINE

## 2021-02-01 RX ORDER — ALBUTEROL SULFATE 0.83 MG/ML
2.5 SOLUTION RESPIRATORY (INHALATION) EVERY 6 HOURS PRN
Qty: 100 EACH | Refills: 5 | Status: SHIPPED | OUTPATIENT
Start: 2021-02-01 | End: 2023-12-14

## 2021-04-16 ENCOUNTER — PATIENT MESSAGE (OUTPATIENT)
Dept: RESEARCH | Facility: HOSPITAL | Age: 86
End: 2021-04-16

## 2021-07-06 RX ORDER — LOSARTAN POTASSIUM 100 MG/1
100 TABLET ORAL DAILY
Qty: 90 TABLET | Refills: 3 | Status: SHIPPED | OUTPATIENT
Start: 2021-07-06 | End: 2022-06-24

## 2021-08-24 ENCOUNTER — OFFICE VISIT (OUTPATIENT)
Dept: INTERNAL MEDICINE | Facility: CLINIC | Age: 86
End: 2021-08-24
Attending: INTERNAL MEDICINE
Payer: MEDICARE

## 2021-08-24 VITALS
BODY MASS INDEX: 25.32 KG/M2 | HEART RATE: 91 BPM | OXYGEN SATURATION: 98 % | WEIGHT: 129 LBS | SYSTOLIC BLOOD PRESSURE: 122 MMHG | HEIGHT: 60 IN | DIASTOLIC BLOOD PRESSURE: 74 MMHG

## 2021-08-24 DIAGNOSIS — E55.9 VITAMIN D DEFICIENCY: ICD-10-CM

## 2021-08-24 DIAGNOSIS — D50.8 OTHER IRON DEFICIENCY ANEMIA: ICD-10-CM

## 2021-08-24 DIAGNOSIS — R79.89 OTHER SPECIFIED ABNORMAL FINDINGS OF BLOOD CHEMISTRY: ICD-10-CM

## 2021-08-24 DIAGNOSIS — E03.9 HYPOTHYROIDISM, UNSPECIFIED TYPE: ICD-10-CM

## 2021-08-24 DIAGNOSIS — D50.9 IRON DEFICIENCY ANEMIA, UNSPECIFIED IRON DEFICIENCY ANEMIA TYPE: ICD-10-CM

## 2021-08-24 DIAGNOSIS — E11.22 TYPE 2 DIABETES MELLITUS WITH STAGE 3A CHRONIC KIDNEY DISEASE, WITHOUT LONG-TERM CURRENT USE OF INSULIN: Primary | ICD-10-CM

## 2021-08-24 DIAGNOSIS — N18.31 STAGE 3A CHRONIC KIDNEY DISEASE: ICD-10-CM

## 2021-08-24 DIAGNOSIS — E78.9 DISORDER OF LIPID METABOLISM: ICD-10-CM

## 2021-08-24 DIAGNOSIS — D51.0 PERNICIOUS ANEMIA: ICD-10-CM

## 2021-08-24 DIAGNOSIS — N18.31 TYPE 2 DIABETES MELLITUS WITH STAGE 3A CHRONIC KIDNEY DISEASE, WITHOUT LONG-TERM CURRENT USE OF INSULIN: Primary | ICD-10-CM

## 2021-08-24 DIAGNOSIS — J45.20 MILD INTERMITTENT ASTHMA WITHOUT COMPLICATION: ICD-10-CM

## 2021-08-24 DIAGNOSIS — I10 ESSENTIAL HYPERTENSION: ICD-10-CM

## 2021-08-24 DIAGNOSIS — E03.9 ACQUIRED HYPOTHYROIDISM: ICD-10-CM

## 2021-08-24 PROCEDURE — 99214 OFFICE O/P EST MOD 30 MIN: CPT | Mod: S$GLB,,, | Performed by: INTERNAL MEDICINE

## 2021-08-24 PROCEDURE — 99214 PR OFFICE/OUTPT VISIT, EST, LEVL IV, 30-39 MIN: ICD-10-PCS | Mod: S$GLB,,, | Performed by: INTERNAL MEDICINE

## 2021-08-24 PROCEDURE — 1160F RVW MEDS BY RX/DR IN RCRD: CPT | Mod: CPTII,S$GLB,, | Performed by: INTERNAL MEDICINE

## 2021-08-24 PROCEDURE — 1160F PR REVIEW ALL MEDS BY PRESCRIBER/CLIN PHARMACIST DOCUMENTED: ICD-10-PCS | Mod: CPTII,S$GLB,, | Performed by: INTERNAL MEDICINE

## 2021-08-24 PROCEDURE — 1159F MED LIST DOCD IN RCRD: CPT | Mod: CPTII,S$GLB,, | Performed by: INTERNAL MEDICINE

## 2021-08-24 PROCEDURE — 1159F PR MEDICATION LIST DOCUMENTED IN MEDICAL RECORD: ICD-10-PCS | Mod: CPTII,S$GLB,, | Performed by: INTERNAL MEDICINE

## 2021-08-24 RX ORDER — FLUTICASONE FUROATE AND VILANTEROL TRIFENATATE 200; 25 UG/1; UG/1
1 POWDER RESPIRATORY (INHALATION) DAILY
Qty: 60 EACH | Refills: 11 | Status: SHIPPED | OUTPATIENT
Start: 2021-08-24

## 2021-08-24 RX ORDER — FERROUS GLUCONATE 324(38)MG
324 TABLET ORAL
Qty: 30 TABLET | Refills: 5 | Status: SHIPPED | OUTPATIENT
Start: 2021-08-24 | End: 2023-02-27 | Stop reason: SDUPTHER

## 2021-10-06 LAB
25(OH)D3 SERPL-MCNC: 36 NG/ML (ref 30–100)
ALBUMIN SERPL-MCNC: 3.8 G/DL (ref 3.6–5.1)
ALBUMIN/GLOB SERPL: 1.2 (CALC) (ref 1–2.5)
ALP SERPL-CCNC: 78 U/L (ref 37–153)
ALT SERPL-CCNC: 4 U/L (ref 6–29)
AST SERPL-CCNC: 14 U/L (ref 10–35)
BASOPHILS # BLD AUTO: 38 CELLS/UL (ref 0–200)
BASOPHILS NFR BLD AUTO: 0.5 %
BILIRUB SERPL-MCNC: 0.4 MG/DL (ref 0.2–1.2)
BUN SERPL-MCNC: 20 MG/DL (ref 7–25)
BUN/CREAT SERPL: 13 (CALC) (ref 6–22)
CALCIUM SERPL-MCNC: 10.4 MG/DL (ref 8.6–10.4)
CHLORIDE SERPL-SCNC: 108 MMOL/L (ref 98–110)
CHOLEST SERPL-MCNC: 183 MG/DL
CHOLEST/HDLC SERPL: 3.5 (CALC)
CO2 SERPL-SCNC: 26 MMOL/L (ref 20–32)
CREAT SERPL-MCNC: 1.57 MG/DL (ref 0.6–0.88)
EOSINOPHIL # BLD AUTO: 180 CELLS/UL (ref 15–500)
EOSINOPHIL NFR BLD AUTO: 2.4 %
ERYTHROCYTE [DISTWIDTH] IN BLOOD BY AUTOMATED COUNT: 17.6 % (ref 11–15)
GLOBULIN SER CALC-MCNC: 3.2 G/DL (CALC) (ref 1.9–3.7)
GLUCOSE SERPL-MCNC: 89 MG/DL (ref 65–99)
HBA1C MFR BLD: 5.9 % OF TOTAL HGB
HCT VFR BLD AUTO: 33.6 % (ref 35–45)
HDLC SERPL-MCNC: 52 MG/DL
HGB BLD-MCNC: 10.3 G/DL (ref 11.7–15.5)
IRON SATN MFR SERPL: 10 % (CALC) (ref 16–45)
IRON SERPL-MCNC: 37 MCG/DL (ref 45–160)
LDLC SERPL CALC-MCNC: 93 MG/DL (CALC)
LYMPHOCYTES # BLD AUTO: 1350 CELLS/UL (ref 850–3900)
LYMPHOCYTES NFR BLD AUTO: 18 %
MCH RBC QN AUTO: 25.2 PG (ref 27–33)
MCHC RBC AUTO-ENTMCNC: 30.7 G/DL (ref 32–36)
MCV RBC AUTO: 82.4 FL (ref 80–100)
MONOCYTES # BLD AUTO: 833 CELLS/UL (ref 200–950)
MONOCYTES NFR BLD AUTO: 11.1 %
NEUTROPHILS # BLD AUTO: 5100 CELLS/UL (ref 1500–7800)
NEUTROPHILS NFR BLD AUTO: 68 %
NONHDLC SERPL-MCNC: 131 MG/DL (CALC)
PLATELET # BLD AUTO: 168 THOUSAND/UL (ref 140–400)
PMV BLD REES-ECKER: 10.5 FL (ref 7.5–12.5)
POTASSIUM SERPL-SCNC: 4.3 MMOL/L (ref 3.5–5.3)
PROT SERPL-MCNC: 7 G/DL (ref 6.1–8.1)
RBC # BLD AUTO: 4.08 MILLION/UL (ref 3.8–5.1)
SODIUM SERPL-SCNC: 143 MMOL/L (ref 135–146)
T4 FREE SERPL-MCNC: 0.9 NG/DL (ref 0.8–1.8)
TIBC SERPL-MCNC: 377 MCG/DL (CALC) (ref 250–450)
TRIGL SERPL-MCNC: 267 MG/DL
TSH SERPL-ACNC: 55.37 MIU/L (ref 0.4–4.5)
VIT B12 SERPL-MCNC: 516 PG/ML (ref 200–1100)
WBC # BLD AUTO: 7.5 THOUSAND/UL (ref 3.8–10.8)

## 2021-10-07 RX ORDER — LEVOTHYROXINE SODIUM 100 UG/1
100 TABLET ORAL
Qty: 90 TABLET | Refills: 3 | Status: SHIPPED | OUTPATIENT
Start: 2021-10-07 | End: 2022-09-25

## 2021-10-12 ENCOUNTER — CLINICAL SUPPORT (OUTPATIENT)
Dept: INTERNAL MEDICINE | Facility: CLINIC | Age: 86
End: 2021-10-12
Payer: MEDICARE

## 2021-10-12 DIAGNOSIS — Z23 FLU VACCINE NEED: Primary | ICD-10-CM

## 2021-10-12 PROCEDURE — 90694 FLU VACCINE - QUADRIVALENT - ADJUVANTED: ICD-10-PCS | Mod: S$GLB,,, | Performed by: INTERNAL MEDICINE

## 2021-10-12 PROCEDURE — 90694 VACC AIIV4 NO PRSRV 0.5ML IM: CPT | Mod: S$GLB,,, | Performed by: INTERNAL MEDICINE

## 2021-10-12 PROCEDURE — G0008 ADMIN INFLUENZA VIRUS VAC: HCPCS | Mod: S$GLB,,, | Performed by: INTERNAL MEDICINE

## 2021-10-12 PROCEDURE — G0008 FLU VACCINE - QUADRIVALENT - ADJUVANTED: ICD-10-PCS | Mod: S$GLB,,, | Performed by: INTERNAL MEDICINE

## 2021-10-20 ENCOUNTER — PES CALL (OUTPATIENT)
Dept: ADMINISTRATIVE | Facility: CLINIC | Age: 86
End: 2021-10-20

## 2021-11-19 ENCOUNTER — PES CALL (OUTPATIENT)
Dept: ADMINISTRATIVE | Facility: CLINIC | Age: 86
End: 2021-11-19
Payer: MEDICARE

## 2022-02-24 ENCOUNTER — OFFICE VISIT (OUTPATIENT)
Dept: INTERNAL MEDICINE | Facility: CLINIC | Age: 87
End: 2022-02-24
Attending: INTERNAL MEDICINE
Payer: MEDICARE

## 2022-02-24 VITALS
DIASTOLIC BLOOD PRESSURE: 80 MMHG | HEIGHT: 60 IN | WEIGHT: 125 LBS | BODY MASS INDEX: 24.54 KG/M2 | OXYGEN SATURATION: 99 % | HEART RATE: 90 BPM | SYSTOLIC BLOOD PRESSURE: 120 MMHG

## 2022-02-24 DIAGNOSIS — N18.31 TYPE 2 DIABETES MELLITUS WITH STAGE 3A CHRONIC KIDNEY DISEASE, WITHOUT LONG-TERM CURRENT USE OF INSULIN: Primary | ICD-10-CM

## 2022-02-24 DIAGNOSIS — D50.9 IRON DEFICIENCY ANEMIA, UNSPECIFIED IRON DEFICIENCY ANEMIA TYPE: ICD-10-CM

## 2022-02-24 DIAGNOSIS — Z13.31 SCREENING FOR DEPRESSION: ICD-10-CM

## 2022-02-24 DIAGNOSIS — I10 ESSENTIAL HYPERTENSION: ICD-10-CM

## 2022-02-24 DIAGNOSIS — J45.20 MILD INTERMITTENT ASTHMA WITHOUT COMPLICATION: ICD-10-CM

## 2022-02-24 DIAGNOSIS — Z13.39 SCREENING FOR ALCOHOLISM: ICD-10-CM

## 2022-02-24 DIAGNOSIS — E03.9 ACQUIRED HYPOTHYROIDISM: ICD-10-CM

## 2022-02-24 DIAGNOSIS — E11.22 TYPE 2 DIABETES MELLITUS WITH STAGE 3A CHRONIC KIDNEY DISEASE, WITHOUT LONG-TERM CURRENT USE OF INSULIN: Primary | ICD-10-CM

## 2022-02-24 DIAGNOSIS — N18.31 STAGE 3A CHRONIC KIDNEY DISEASE: ICD-10-CM

## 2022-02-24 PROCEDURE — 99499 UNLISTED E&M SERVICE: CPT | Mod: S$GLB,,, | Performed by: INTERNAL MEDICINE

## 2022-02-24 PROCEDURE — 1160F RVW MEDS BY RX/DR IN RCRD: CPT | Mod: CPTII,S$GLB,, | Performed by: INTERNAL MEDICINE

## 2022-02-24 PROCEDURE — 1159F MED LIST DOCD IN RCRD: CPT | Mod: CPTII,S$GLB,, | Performed by: INTERNAL MEDICINE

## 2022-02-24 PROCEDURE — G0442 ANNUAL ALCOHOL SCREEN 15 MIN: HCPCS | Mod: 59,S$GLB,, | Performed by: INTERNAL MEDICINE

## 2022-02-24 PROCEDURE — 1159F PR MEDICATION LIST DOCUMENTED IN MEDICAL RECORD: ICD-10-PCS | Mod: CPTII,S$GLB,, | Performed by: INTERNAL MEDICINE

## 2022-02-24 PROCEDURE — 99499 RISK ADDL DX/OHS AUDIT: ICD-10-PCS | Mod: S$GLB,,, | Performed by: INTERNAL MEDICINE

## 2022-02-24 PROCEDURE — 99214 PR OFFICE/OUTPT VISIT, EST, LEVL IV, 30-39 MIN: ICD-10-PCS | Mod: S$GLB,,, | Performed by: INTERNAL MEDICINE

## 2022-02-24 PROCEDURE — G0444 PR DEPRESSION SCREENING: ICD-10-PCS | Mod: 59,S$GLB,, | Performed by: INTERNAL MEDICINE

## 2022-02-24 PROCEDURE — 99214 OFFICE O/P EST MOD 30 MIN: CPT | Mod: S$GLB,,, | Performed by: INTERNAL MEDICINE

## 2022-02-24 PROCEDURE — 1160F PR REVIEW ALL MEDS BY PRESCRIBER/CLIN PHARMACIST DOCUMENTED: ICD-10-PCS | Mod: CPTII,S$GLB,, | Performed by: INTERNAL MEDICINE

## 2022-02-24 PROCEDURE — G0444 DEPRESSION SCREEN ANNUAL: HCPCS | Mod: 59,S$GLB,, | Performed by: INTERNAL MEDICINE

## 2022-02-24 PROCEDURE — G0442 PR  ALCOHOL SCREENING: ICD-10-PCS | Mod: 59,S$GLB,, | Performed by: INTERNAL MEDICINE

## 2022-02-24 NOTE — PROGRESS NOTES
Subjective:       Patient ID: Ashely Holman is a 87 y.o. female.    Chief Complaint: Follow-up and Chest Congestion (Needs order for another nebulizer)    Her memory is getting worse.  She feels depressed on some days.  She now has a sitter 3 days a week.    Follow-up  Pertinent negatives include no chest pain.   Diabetes  She presents for her follow-up diabetic visit. She has type 2 diabetes mellitus. Her disease course has been stable. Hypoglycemia symptoms include nervousness/anxiousness. Pertinent negatives for diabetes include no chest pain.   Hypertension  This is a chronic problem. The current episode started more than 1 year ago. The problem is controlled. Pertinent negatives include no chest pain or shortness of breath.     Review of Systems   Constitutional: Negative.    Respiratory: Negative for shortness of breath.    Cardiovascular: Negative for chest pain.   Psychiatric/Behavioral: Positive for dysphoric mood. The patient is nervous/anxious.          Objective:      Physical Exam  Vitals and nursing note reviewed.   Constitutional:       Appearance: She is well-developed.   HENT:      Head: Normocephalic.   Eyes:      Pupils: Pupils are equal, round, and reactive to light.   Cardiovascular:      Rate and Rhythm: Normal rate and regular rhythm.      Heart sounds: Normal heart sounds.   Pulmonary:      Effort: Pulmonary effort is normal.   Neurological:      Mental Status: She is alert.         Assessment:       Problem List Items Addressed This Visit        10     Type 2 diabetes mellitus with diabetic chronic kidney disease - Primary    Hypothyroid    Essential hypertension    CKD (chronic kidney disease) stage 3, GFR 30-59 ml/min    Asthma, mild intermittent       Unprioritized    Iron deficiency anemia      Other Visit Diagnoses     Screening for depression        Screening for alcoholism              Plan:       Per orders and D/C instructions.  Continue diet and/or meds for Asthma, CKD,  hypothyroid, DM, HTN, and high cholesterol, which are stable.     I suggested starting Remeron 7.5 mg nightly for depression, sleep, and appetite.  She in the daughter want to wait and see if she feels better with the sitter being there.    Screening: The patient was screened for depression with the PHQ2 questionnaire and possible health consequences were discussed with the patient, who understands (15 minutes spent).       The patient was screened for the misuse of alcohol, by asking the number of drinks per average week, and if pt has had more than 4 drinks (more than 3 for women and elderly) in 1 day within the past year. The health and legal consequences of misuse were discussed (15 minutes spent).

## 2022-08-25 ENCOUNTER — LAB VISIT (OUTPATIENT)
Dept: LAB | Facility: OTHER | Age: 87
End: 2022-08-25
Attending: INTERNAL MEDICINE
Payer: MEDICARE

## 2022-08-25 ENCOUNTER — OFFICE VISIT (OUTPATIENT)
Dept: INTERNAL MEDICINE | Facility: CLINIC | Age: 87
End: 2022-08-25
Attending: INTERNAL MEDICINE
Payer: MEDICARE

## 2022-08-25 VITALS
HEART RATE: 94 BPM | BODY MASS INDEX: 24.15 KG/M2 | SYSTOLIC BLOOD PRESSURE: 138 MMHG | HEIGHT: 60 IN | DIASTOLIC BLOOD PRESSURE: 80 MMHG | OXYGEN SATURATION: 98 % | WEIGHT: 123 LBS

## 2022-08-25 DIAGNOSIS — R79.89 OTHER SPECIFIED ABNORMAL FINDINGS OF BLOOD CHEMISTRY: ICD-10-CM

## 2022-08-25 DIAGNOSIS — E03.9 ACQUIRED HYPOTHYROIDISM: ICD-10-CM

## 2022-08-25 DIAGNOSIS — E55.9 VITAMIN D DEFICIENCY: ICD-10-CM

## 2022-08-25 DIAGNOSIS — D50.9 IRON DEFICIENCY ANEMIA, UNSPECIFIED IRON DEFICIENCY ANEMIA TYPE: ICD-10-CM

## 2022-08-25 DIAGNOSIS — E03.9 HYPOTHYROIDISM, UNSPECIFIED TYPE: ICD-10-CM

## 2022-08-25 DIAGNOSIS — I10 ESSENTIAL HYPERTENSION: ICD-10-CM

## 2022-08-25 DIAGNOSIS — D50.8 OTHER IRON DEFICIENCY ANEMIA: ICD-10-CM

## 2022-08-25 DIAGNOSIS — Z86.39 HISTORY OF DIET-CONTROLLED DIABETES: Primary | ICD-10-CM

## 2022-08-25 DIAGNOSIS — J45.20 MILD INTERMITTENT ASTHMA WITHOUT COMPLICATION: ICD-10-CM

## 2022-08-25 DIAGNOSIS — E78.9 DISORDER OF LIPID METABOLISM: ICD-10-CM

## 2022-08-25 DIAGNOSIS — D51.0 PERNICIOUS ANEMIA: ICD-10-CM

## 2022-08-25 DIAGNOSIS — N18.31 STAGE 3A CHRONIC KIDNEY DISEASE: ICD-10-CM

## 2022-08-25 LAB
ALBUMIN SERPL BCP-MCNC: 3.7 G/DL (ref 3.5–5.2)
ALP SERPL-CCNC: 95 U/L (ref 55–135)
ALT SERPL W/O P-5'-P-CCNC: 11 U/L (ref 10–44)
ANION GAP SERPL CALC-SCNC: 11 MMOL/L (ref 8–16)
AST SERPL-CCNC: 19 U/L (ref 10–40)
BASOPHILS # BLD AUTO: 0.05 K/UL (ref 0–0.2)
BASOPHILS NFR BLD: 0.6 % (ref 0–1.9)
BILIRUB SERPL-MCNC: 0.4 MG/DL (ref 0.1–1)
BUN SERPL-MCNC: 26 MG/DL (ref 8–23)
CALCIUM SERPL-MCNC: 11.1 MG/DL (ref 8.7–10.5)
CHLORIDE SERPL-SCNC: 111 MMOL/L (ref 95–110)
CHOLEST SERPL-MCNC: 165 MG/DL (ref 120–199)
CHOLEST/HDLC SERPL: 3.4 {RATIO} (ref 2–5)
CO2 SERPL-SCNC: 20 MMOL/L (ref 23–29)
CREAT SERPL-MCNC: 1.5 MG/DL (ref 0.5–1.4)
DIFFERENTIAL METHOD: ABNORMAL
EOSINOPHIL # BLD AUTO: 0.2 K/UL (ref 0–0.5)
EOSINOPHIL NFR BLD: 2.6 % (ref 0–8)
ERYTHROCYTE [DISTWIDTH] IN BLOOD BY AUTOMATED COUNT: 15.4 % (ref 11.5–14.5)
EST. GFR  (NO RACE VARIABLE): 33 ML/MIN/1.73 M^2
GLUCOSE SERPL-MCNC: 103 MG/DL (ref 70–110)
HCT VFR BLD AUTO: 38.2 % (ref 37–48.5)
HDLC SERPL-MCNC: 48 MG/DL (ref 40–75)
HDLC SERPL: 29.1 % (ref 20–50)
HGB BLD-MCNC: 11.9 G/DL (ref 12–16)
IMM GRANULOCYTES # BLD AUTO: 0.03 K/UL (ref 0–0.04)
IMM GRANULOCYTES NFR BLD AUTO: 0.3 % (ref 0–0.5)
LDLC SERPL CALC-MCNC: 74.6 MG/DL (ref 63–159)
LYMPHOCYTES # BLD AUTO: 1.6 K/UL (ref 1–4.8)
LYMPHOCYTES NFR BLD: 17.7 % (ref 18–48)
MCH RBC QN AUTO: 28.5 PG (ref 27–31)
MCHC RBC AUTO-ENTMCNC: 31.2 G/DL (ref 32–36)
MCV RBC AUTO: 92 FL (ref 82–98)
MONOCYTES # BLD AUTO: 0.9 K/UL (ref 0.3–1)
MONOCYTES NFR BLD: 9.9 % (ref 4–15)
NEUTROPHILS # BLD AUTO: 6.1 K/UL (ref 1.8–7.7)
NEUTROPHILS NFR BLD: 68.9 % (ref 38–73)
NONHDLC SERPL-MCNC: 117 MG/DL
NRBC BLD-RTO: 0 /100 WBC
PLATELET # BLD AUTO: 136 K/UL (ref 150–450)
PLATELET BLD QL SMEAR: ABNORMAL
PMV BLD AUTO: 10.9 FL (ref 9.2–12.9)
POTASSIUM SERPL-SCNC: 4.9 MMOL/L (ref 3.5–5.1)
PROT SERPL-MCNC: 7.8 G/DL (ref 6–8.4)
RBC # BLD AUTO: 4.17 M/UL (ref 4–5.4)
SODIUM SERPL-SCNC: 142 MMOL/L (ref 136–145)
T4 FREE SERPL-MCNC: 1.1 NG/DL (ref 0.71–1.51)
TRIGL SERPL-MCNC: 212 MG/DL (ref 30–150)
TSH SERPL DL<=0.005 MIU/L-ACNC: 0.89 UIU/ML (ref 0.4–4)
WBC # BLD AUTO: 8.78 K/UL (ref 3.9–12.7)

## 2022-08-25 PROCEDURE — 1159F MED LIST DOCD IN RCRD: CPT | Mod: CPTII,S$GLB,, | Performed by: INTERNAL MEDICINE

## 2022-08-25 PROCEDURE — 82607 VITAMIN B-12: CPT | Performed by: INTERNAL MEDICINE

## 2022-08-25 PROCEDURE — 80061 LIPID PANEL: CPT | Performed by: INTERNAL MEDICINE

## 2022-08-25 PROCEDURE — 99214 PR OFFICE/OUTPT VISIT, EST, LEVL IV, 30-39 MIN: ICD-10-PCS | Mod: S$GLB,,, | Performed by: INTERNAL MEDICINE

## 2022-08-25 PROCEDURE — 85025 COMPLETE CBC W/AUTO DIFF WBC: CPT | Performed by: INTERNAL MEDICINE

## 2022-08-25 PROCEDURE — 80053 COMPREHEN METABOLIC PANEL: CPT | Performed by: INTERNAL MEDICINE

## 2022-08-25 PROCEDURE — 82306 VITAMIN D 25 HYDROXY: CPT | Performed by: INTERNAL MEDICINE

## 2022-08-25 PROCEDURE — 1160F RVW MEDS BY RX/DR IN RCRD: CPT | Mod: CPTII,S$GLB,, | Performed by: INTERNAL MEDICINE

## 2022-08-25 PROCEDURE — 1159F PR MEDICATION LIST DOCUMENTED IN MEDICAL RECORD: ICD-10-PCS | Mod: CPTII,S$GLB,, | Performed by: INTERNAL MEDICINE

## 2022-08-25 PROCEDURE — 36415 COLL VENOUS BLD VENIPUNCTURE: CPT | Performed by: INTERNAL MEDICINE

## 2022-08-25 PROCEDURE — 99214 OFFICE O/P EST MOD 30 MIN: CPT | Mod: S$GLB,,, | Performed by: INTERNAL MEDICINE

## 2022-08-25 PROCEDURE — 83036 HEMOGLOBIN GLYCOSYLATED A1C: CPT | Performed by: INTERNAL MEDICINE

## 2022-08-25 PROCEDURE — 84443 ASSAY THYROID STIM HORMONE: CPT | Performed by: INTERNAL MEDICINE

## 2022-08-25 PROCEDURE — 84439 ASSAY OF FREE THYROXINE: CPT | Performed by: INTERNAL MEDICINE

## 2022-08-25 PROCEDURE — 1160F PR REVIEW ALL MEDS BY PRESCRIBER/CLIN PHARMACIST DOCUMENTED: ICD-10-PCS | Mod: CPTII,S$GLB,, | Performed by: INTERNAL MEDICINE

## 2022-08-25 NOTE — PROGRESS NOTES
Subjective:       Patient ID: Ashely Holman is a 88 y.o. female.    Chief Complaint: Follow-up and Chest Congestion    Follow-up  Associated symptoms include arthralgias. Pertinent negatives include no chest pain.   Hypertension  This is a chronic problem. The current episode started more than 1 year ago. The problem is controlled. Pertinent negatives include no chest pain or shortness of breath.     Review of Systems   Constitutional: Negative.    Respiratory: Negative for shortness of breath.    Cardiovascular: Negative for chest pain.   Musculoskeletal: Positive for arthralgias.         Objective:      Physical Exam  Vitals and nursing note reviewed.   Constitutional:       Appearance: She is well-developed.   HENT:      Head: Normocephalic.   Eyes:      Pupils: Pupils are equal, round, and reactive to light.   Cardiovascular:      Rate and Rhythm: Normal rate and regular rhythm.      Heart sounds: Normal heart sounds.   Pulmonary:      Effort: Pulmonary effort is normal.      Breath sounds: Rales present.   Neurological:      Mental Status: She is alert.         Assessment:       Problem List Items Addressed This Visit        10     History of diet-controlled diabetes - Primary    Hypothyroid    Asthma, mild intermittent    CKD (chronic kidney disease) stage 3, GFR 30-59 ml/min    Essential hypertension       Unprioritized    Iron deficiency anemia          Plan:       Per orders and D/C instructions.  Continue diet and/or meds for hypothyroid, HTN, asthma and CKD, which are stable.     check routine labs.    Between 30 and 39 min of total time for evaluation and management services were spent on the patient today.  The medical problems and treatment options were discussed, and all questions were answered.

## 2022-08-26 LAB
25(OH)D3+25(OH)D2 SERPL-MCNC: 46 NG/ML (ref 30–96)
ESTIMATED AVG GLUCOSE: 111 MG/DL (ref 68–131)
HBA1C MFR BLD: 5.5 % (ref 4–5.6)
VIT B12 SERPL-MCNC: 521 PG/ML (ref 210–950)

## 2022-08-30 ENCOUNTER — PATIENT MESSAGE (OUTPATIENT)
Dept: INTERNAL MEDICINE | Facility: CLINIC | Age: 87
End: 2022-08-30
Payer: MEDICARE

## 2022-12-05 DIAGNOSIS — N64.59 ABNORMAL BREAST EXAM: ICD-10-CM

## 2022-12-05 DIAGNOSIS — N63.20 MASS OF LEFT BREAST, UNSPECIFIED QUADRANT: ICD-10-CM

## 2022-12-05 DIAGNOSIS — N63.20 MASS OF LEFT BREAST, UNSPECIFIED QUADRANT: Primary | ICD-10-CM

## 2022-12-29 ENCOUNTER — HOSPITAL ENCOUNTER (OUTPATIENT)
Dept: RADIOLOGY | Facility: OTHER | Age: 87
Discharge: HOME | End: 2022-12-29
Attending: INTERNAL MEDICINE
Payer: MEDICARE

## 2022-12-29 DIAGNOSIS — N63.20 MASS OF LEFT BREAST, UNSPECIFIED QUADRANT: ICD-10-CM

## 2022-12-29 PROCEDURE — 76642 US BREAST BILATERAL LIMITED: ICD-10-PCS | Mod: 26,50,, | Performed by: RADIOLOGY

## 2022-12-29 PROCEDURE — 77062 MAMMO DIGITAL DIAGNOSTIC BILAT WITH TOMO: ICD-10-PCS | Mod: 26,,, | Performed by: RADIOLOGY

## 2022-12-29 PROCEDURE — 77066 DX MAMMO INCL CAD BI: CPT | Mod: TC

## 2022-12-29 PROCEDURE — 76642 ULTRASOUND BREAST LIMITED: CPT | Mod: TC,50

## 2022-12-29 PROCEDURE — 77066 MAMMO DIGITAL DIAGNOSTIC BILAT WITH TOMO: ICD-10-PCS | Mod: 26,,, | Performed by: RADIOLOGY

## 2022-12-29 PROCEDURE — 76642 ULTRASOUND BREAST LIMITED: CPT | Mod: 26,50,, | Performed by: RADIOLOGY

## 2022-12-29 PROCEDURE — 77062 BREAST TOMOSYNTHESIS BI: CPT | Mod: 26,,, | Performed by: RADIOLOGY

## 2022-12-29 PROCEDURE — 77066 DX MAMMO INCL CAD BI: CPT | Mod: 26,,, | Performed by: RADIOLOGY

## 2022-12-30 DIAGNOSIS — N63.20 MASS OF LEFT BREAST, UNSPECIFIED QUADRANT: Primary | ICD-10-CM

## 2023-01-12 ENCOUNTER — HOSPITAL ENCOUNTER (OUTPATIENT)
Dept: RADIOLOGY | Facility: OTHER | Age: 88
Discharge: HOME | End: 2023-01-12
Attending: INTERNAL MEDICINE
Payer: MEDICARE

## 2023-01-12 DIAGNOSIS — R92.8 ABNORMAL MAMMOGRAM: ICD-10-CM

## 2023-01-12 DIAGNOSIS — R92.8 ABNORMAL MAMMOGRAM: Primary | ICD-10-CM

## 2023-01-12 PROCEDURE — 88360 PR  TUMOR IMMUNOHISTOCHEM/MANUAL: ICD-10-PCS | Mod: 26,,, | Performed by: PATHOLOGY

## 2023-01-12 PROCEDURE — 88305 TISSUE EXAM BY PATHOLOGIST: CPT | Mod: 26,,, | Performed by: PATHOLOGY

## 2023-01-12 PROCEDURE — 88342 CHG IMMUNOCYTOCHEMISTRY: ICD-10-PCS | Mod: 26,59,, | Performed by: PATHOLOGY

## 2023-01-12 PROCEDURE — 77065 DX MAMMO INCL CAD UNI: CPT | Mod: 26,LT,, | Performed by: RADIOLOGY

## 2023-01-12 PROCEDURE — 25000003 PHARM REV CODE 250: Performed by: INTERNAL MEDICINE

## 2023-01-12 PROCEDURE — 19083 BX BREAST 1ST LESION US IMAG: CPT | Mod: LT,,, | Performed by: RADIOLOGY

## 2023-01-12 PROCEDURE — 88342 IMHCHEM/IMCYTCHM 1ST ANTB: CPT | Mod: 26,59,, | Performed by: PATHOLOGY

## 2023-01-12 PROCEDURE — A4648 IMPLANTABLE TISSUE MARKER: HCPCS

## 2023-01-12 PROCEDURE — 88341 IMHCHEM/IMCYTCHM EA ADD ANTB: CPT | Mod: 26,59,, | Performed by: PATHOLOGY

## 2023-01-12 PROCEDURE — 88341 PR IHC OR ICC EACH ADD'L SINGLE ANTIBODY  STAINPR: ICD-10-PCS | Mod: 26,59,, | Performed by: PATHOLOGY

## 2023-01-12 PROCEDURE — 88305 TISSUE EXAM BY PATHOLOGIST: CPT | Performed by: PATHOLOGY

## 2023-01-12 PROCEDURE — 77065 MAMMO DIGITAL DIAGNOSTIC LEFT: ICD-10-PCS | Mod: 26,LT,, | Performed by: RADIOLOGY

## 2023-01-12 PROCEDURE — 88360 TUMOR IMMUNOHISTOCHEM/MANUAL: CPT | Performed by: PATHOLOGY

## 2023-01-12 PROCEDURE — 88341 IMHCHEM/IMCYTCHM EA ADD ANTB: CPT | Performed by: PATHOLOGY

## 2023-01-12 PROCEDURE — 88305 TISSUE EXAM BY PATHOLOGIST: ICD-10-PCS | Mod: 26,,, | Performed by: PATHOLOGY

## 2023-01-12 PROCEDURE — 27201068 US BREAST BIOPSY WITH IMAGING 1ST SITE LEFT

## 2023-01-12 PROCEDURE — 19083 US BREAST BIOPSY WITH IMAGING 1ST SITE LEFT: ICD-10-PCS | Mod: LT,,, | Performed by: RADIOLOGY

## 2023-01-12 PROCEDURE — 88342 IMHCHEM/IMCYTCHM 1ST ANTB: CPT | Performed by: PATHOLOGY

## 2023-01-12 PROCEDURE — 77065 DX MAMMO INCL CAD UNI: CPT | Mod: TC,LT

## 2023-01-12 PROCEDURE — 88360 TUMOR IMMUNOHISTOCHEM/MANUAL: CPT | Mod: 26,,, | Performed by: PATHOLOGY

## 2023-01-12 RX ORDER — LIDOCAINE HYDROCHLORIDE AND EPINEPHRINE 20; 10 MG/ML; UG/ML
10 INJECTION, SOLUTION INFILTRATION; PERINEURAL ONCE
Status: COMPLETED | OUTPATIENT
Start: 2023-01-12 | End: 2023-01-12

## 2023-01-12 RX ORDER — LIDOCAINE HYDROCHLORIDE 10 MG/ML
5 INJECTION INFILTRATION; PERINEURAL ONCE
Status: COMPLETED | OUTPATIENT
Start: 2023-01-12 | End: 2023-01-12

## 2023-01-12 RX ADMIN — LIDOCAINE HYDROCHLORIDE 5 ML: 10 INJECTION, SOLUTION EPIDURAL; INFILTRATION; INTRACAUDAL at 02:01

## 2023-01-12 RX ADMIN — LIDOCAINE HYDROCHLORIDE,EPINEPHRINE BITARTRATE 10 ML: 20; .01 INJECTION, SOLUTION INFILTRATION; PERINEURAL at 02:01

## 2023-01-18 ENCOUNTER — TELEPHONE (OUTPATIENT)
Dept: RADIOLOGY | Facility: OTHER | Age: 88
End: 2023-01-18
Payer: MEDICARE

## 2023-01-18 ENCOUNTER — DOCUMENTATION ONLY (OUTPATIENT)
Dept: HEMATOLOGY/ONCOLOGY | Facility: CLINIC | Age: 88
End: 2023-01-18
Payer: MEDICARE

## 2023-01-18 NOTE — NURSING
Pt's dtr declined an interpretor      Spoke with pt's dtr, Anastasia, who schedules all of her mothers appts.  The pt is scheduled to see Dr Cotter, tomorrow 1/19/23 at [facility].  Reviewed location, provided my direct contact information and encouraged her to call for assistance.  Oncology Navigation   Intake  Date of Diagnosis: 01/12/23  Cancer Type: Breast  Internal / External Referral: Internal  Date of Referral: 12/30/22  Initial Nurse Navigator Contact: 01/18/23  Referral to Initial Contact Timeline (days): 19  Date Worked: 01/18/23  First Appointment Available: 01/19/23  Appointment Date: 01/19/23  First Available Date vs. Scheduled Date (days): 0     Treatment  Current Status: Staging work-up    Surgical Oncologist: Vahe  Consult Date: 01/19/23          Procedures: Biopsy  Biopsy Schedule Date: 01/12/23             Support Systems: Family members     Acuity  Treatment Tolerability: Has not started treatment yet/treatment fully completed and side effects resolved   Needed: 0  Support: 0  Verbalizes Financial Concerns: 0  Transportation: 0  History of noncompliance/frequent no shows and cancellations: 0  Verbalizes the need for more education: 0  Navigation Acuity: 0     Follow Up  No follow-ups on file.

## 2023-01-18 NOTE — TELEPHONE ENCOUNTER
Per pts request patient's daughter, Anastasia, notified of left breast biopsy results per pathology reported on 1/18/2023. Diagnosis: DCIS. Explained results are positive for breast cancer. Instructed on need for consultation with breast surgeon. Questions answered. Appointment with breast surgeon requested. Office will call patient directly to confirm appointment. Verbalized understanding. Biopsy site WNL per pt via daughter. Encouraged to call [phone] for further questions or concerns.

## 2023-01-19 ENCOUNTER — OFFICE VISIT (OUTPATIENT)
Dept: SURGERY | Facility: CLINIC | Age: 88
End: 2023-01-19
Payer: MEDICARE

## 2023-01-19 VITALS
HEIGHT: 64 IN | DIASTOLIC BLOOD PRESSURE: 86 MMHG | OXYGEN SATURATION: 97 % | HEART RATE: 77 BPM | SYSTOLIC BLOOD PRESSURE: 143 MMHG | BODY MASS INDEX: 21 KG/M2 | TEMPERATURE: 98 F | WEIGHT: 123 LBS

## 2023-01-19 DIAGNOSIS — D05.12 BREAST NEOPLASM, TIS (DCIS), LEFT: Primary | ICD-10-CM

## 2023-01-19 DIAGNOSIS — Z01.818 PRE-OP TESTING: ICD-10-CM

## 2023-01-19 PROCEDURE — 1101F PR PT FALLS ASSESS DOC 0-1 FALLS W/OUT INJ PAST YR: ICD-10-PCS | Mod: CPTII,S$GLB,, | Performed by: SURGERY

## 2023-01-19 PROCEDURE — 3288F PR FALLS RISK ASSESSMENT DOCUMENTED: ICD-10-PCS | Mod: CPTII,S$GLB,, | Performed by: SURGERY

## 2023-01-19 PROCEDURE — 99999 PR PBB SHADOW E&M-EST. PATIENT-LVL III: CPT | Mod: PBBFAC,,, | Performed by: SURGERY

## 2023-01-19 PROCEDURE — 1101F PT FALLS ASSESS-DOCD LE1/YR: CPT | Mod: CPTII,S$GLB,, | Performed by: SURGERY

## 2023-01-19 PROCEDURE — 99205 PR OFFICE/OUTPT VISIT, NEW, LEVL V, 60-74 MIN: ICD-10-PCS | Mod: S$GLB,,, | Performed by: SURGERY

## 2023-01-19 PROCEDURE — 99205 OFFICE O/P NEW HI 60 MIN: CPT | Mod: S$GLB,,, | Performed by: SURGERY

## 2023-01-19 PROCEDURE — 1126F PR PAIN SEVERITY QUANTIFIED, NO PAIN PRESENT: ICD-10-PCS | Mod: CPTII,S$GLB,, | Performed by: SURGERY

## 2023-01-19 PROCEDURE — 99999 PR PBB SHADOW E&M-EST. PATIENT-LVL III: ICD-10-PCS | Mod: PBBFAC,,, | Performed by: SURGERY

## 2023-01-19 PROCEDURE — 1160F RVW MEDS BY RX/DR IN RCRD: CPT | Mod: CPTII,S$GLB,, | Performed by: SURGERY

## 2023-01-19 PROCEDURE — 1159F MED LIST DOCD IN RCRD: CPT | Mod: CPTII,S$GLB,, | Performed by: SURGERY

## 2023-01-19 PROCEDURE — 3288F FALL RISK ASSESSMENT DOCD: CPT | Mod: CPTII,S$GLB,, | Performed by: SURGERY

## 2023-01-19 PROCEDURE — 1159F PR MEDICATION LIST DOCUMENTED IN MEDICAL RECORD: ICD-10-PCS | Mod: CPTII,S$GLB,, | Performed by: SURGERY

## 2023-01-19 PROCEDURE — 1126F AMNT PAIN NOTED NONE PRSNT: CPT | Mod: CPTII,S$GLB,, | Performed by: SURGERY

## 2023-01-19 PROCEDURE — 1160F PR REVIEW ALL MEDS BY PRESCRIBER/CLIN PHARMACIST DOCUMENTED: ICD-10-PCS | Mod: CPTII,S$GLB,, | Performed by: SURGERY

## 2023-01-19 NOTE — PROGRESS NOTES
Breast Surgery  [facility]  Department of Surgery      REFERRING PROVIDER: ANURADHA Badillo MD  [address]  [address]  [place] [address]    Chief Complaint: L breast mass     Subjective:      Patient ID: Ashely Holman is a 88 y.o. female who presents with left breast mass.    Follow-up mammogram (2022) showed oval mass with circumscribed margins seen in the left breast at 12 o'clock (known fibroadenoma) and bloody nipple discharge on mammographic compression. Subsequent US showed a chain intraductal masses in one of the ductal systems leading to the nipple measuring approximately 5 mm x 4 mm x 5 mm. 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     Patient does routinely do self breast exams. Patient has noted a change on breast exam.  Patient reports non-spontaneous nipple discharge. Patient has to previous breast biopsy. Patient denies a personal history of breast cancer.    Findings at that time were the following:   Tumor size: multiple 5mm masses spanning 2.5 cm  Tumor ndgndrndanddndend:nd nd2nd Estrogen Receptor: +   Progesterone Receptor: n/a   Her-2 nancy: n/a   Lymph node status: n/a   Lymphatic invasion: n/a     GYN History:  Age of menarche was 10. Patient denies hormonal therapy. Patient is . Age of first live birth was 20. Patient did breast feed.    Past Medical History:   Diagnosis Date    Asthma 2014    CKD (chronic kidney disease) 2014    COPD (chronic obstructive pulmonary disease)     Diastolic dysfunction 2014    Echo     DJD (degenerative joint disease) of knee 2014    Severe Dr. Garcia.    DM2 (diabetes mellitus, type 2) 2014    Encounter for blood transfusion     Glaucoma     Hypertension     Iron deficiency anemia 2014    Dr. Lo    Osteoporosis, post-menopausal 2014    Heel scan     Pharyngeal dysphagia 2/3/2016      Amarjit    Post herpetic neuralgia 2/25/2014    Left thigh Dr. Huang    Solitary lung nodule 4/20/2014    Right LL - 4 mm. CT 2007, 2008. CXR 4/14.    Stroke     2015    Thyroid disease      Past Surgical History:   Procedure Laterality Date    BREAST BIOPSY Left     core bx, benign    CATARACT EXTRACTION W/  INTRAOCULAR LENS IMPLANT  2013    left eye    CHOLECYSTECTOMY  09/2016    right thyroidectomy  2004     Current Outpatient Medications on File Prior to Visit   Medication Sig Dispense Refill    albuterol (PROVENTIL) 2.5 mg /3 mL (0.083 %) nebulizer solution Take 3 mLs (2.5 mg total) by nebulization every 6 (six) hours as needed for Wheezing. 100 each 5    cholecalciferol, vitamin D3, 2,000 unit Cap Take 1 capsule by mouth once daily.      ferrous gluconate (FERGON) 324 MG tablet Take 1 tablet (324 mg total) by mouth daily with breakfast. 30 tablet 5    fluticasone furoate-vilanteroL (BREO ELLIPTA) 200-25 mcg/dose DsDv diskus inhaler Inhale 1 puff into the lungs once daily. Controller 60 each 11    levothyroxine (SYNTHROID) 100 MCG tablet TAKE 1 TABLET(100 MCG) BY MOUTH BEFORE BREAKFAST 90 tablet 3    loratadine (CLARITIN) 10 mg tablet Take 10 mg by mouth once daily.      losartan (COZAAR) 100 MG tablet TAKE 1 TABLET(100 MG) BY MOUTH EVERY DAY 90 tablet 3    timolol maleate 0.5% (TIMOPTIC) 0.5 % Drop       travoprost, benzalkonium, (TRAVATAN) 0.004 % ophthalmic solution 1 drop every evening.      traZODone (DESYREL) 50 MG tablet TAKE 1/2 TO 1 TABLET BY MOUTH EVERY NIGHT AS NEEDED FOR SLEEP 90 tablet 3     No current facility-administered medications on file prior to visit.     Social History     Socioeconomic History    Marital status: Single    Number of children: 1   Tobacco Use    Smoking status: Never    Smokeless tobacco: Never   Substance and Sexual Activity    Alcohol use: No    Drug use: No    Sexual activity: Never   Social History Narrative    Uses a walker. Lives with daughter.     at  "'[employer] for 30 years.    Only speaks Venezuelan.    Has a sitter for 3 hours Thursdays.    2022 - she has a sitter 3 days a week.     Family History   Problem Relation Age of Onset    Hypertension Mother     Arthritis Father     Hypertension Father     Stroke Father     Hypertension Sister     Alcohol abuse Brother     Cancer Brother         colon    Hypertension Brother     Asthma Daughter     Hypertension Daughter     Arthritis Maternal Aunt     Asthma Paternal Grandmother         Review of Systems   Constitutional: Negative.    HENT: Negative.     Eyes: Negative.    Respiratory: Negative.     Cardiovascular: Negative.    Gastrointestinal: Negative.    Endocrine: Negative.    Genitourinary: Negative.    Musculoskeletal: Negative.    Allergic/Immunologic: Negative.    Neurological: Negative.    Hematological:  Bruises/bleeds easily.   Objective:   BP (!) 143/86 (BP Location: Left arm, Patient Position: Sitting, BP Method: Medium (Automatic))   Pulse 77   Temp 98.4 °F (36.9 °C) (Oral)   Ht 5' 4" (1.626 m)   Wt 55.8 kg (123 lb)   SpO2 97%   BMI 21.11 kg/m²     Physical Exam   HENT:   Mouth/Throat: Mucous membranes are moist.   Eyes: Pupils are equal, round, and reactive to light.   Cardiovascular:  Normal rate and normal pulses.            Pulmonary/Chest: Effort normal. Right breast exhibits no inverted nipple, no mass, no nipple discharge, no skin change and no tenderness. Left breast exhibits mass, skin change and tenderness. Left breast exhibits no inverted nipple and no nipple discharge.   Large superficial left breast hematoma spanning width of breast       Abdominal: Soft.   Neurological: She is alert.     Radiology review: Images personally reviewed by me in the clinic.   Mammogram:  Result:   Mammo Digital Diagnostic Bilat with Arvin  US Breast Bilateral Limited     History:  Patient is 88 y.o. and is seen for diagnostic imaging.     Films Compared:  There are no prior studies available for comparison. "      Findings:  This procedure was performed using tomosynthesis. Computer-aided detection was utilized in the interpretation of this examination.  The breasts are extremely dense, which lowers the sensitivity of mammography.      Left  Mammo Digital Diagnostic with Arvin  There is an oval mass with circumscribed margins seen in the left breast at 12 o'clock. The mass correlates with the palpable mass reported by the patient. A ribbon biopsy marker is present within this mass.      US Breast Limited  There is a 2 cm x 1.1 cm x 2.5 cm oval, hypoechoic mass with circumscribed margins seen in the left breast at 12 o'clock, 4 cm from the nipple. The mass correlates with the palpable mass reported by the patient and with the mass seen on the mammogram. This finding represents a previously biopsied benign mass such as fibroadenoma.      Upon mammographic compression today, the patient experienced non spontaneous left bloody nipple discharge. For this reason, sonographic evaluation of the retroareolar ducts was performed. Intraductal masses can be seen in one of the ductal systems leading to the nipple. For reference, one of the masses measures approximately 5 mm x 4 mm x 5 mm and has internal vascularity. There is mild associated duct ectasia. There is no mammographic correlate.      Right  Mammo Digital Diagnostic with Arvin  There is an oval mass with circumscribed margins seen in the upper outer quadrant of the right breast.      There is no evidence of suspicious masses, calcifications, or other abnormal findings in the right breast.     US Breast Limited  There is a 2.8 cm simple cyst seen in the right breast at 10 o'clock, 7 cm from the nipple. The cyst correlates with the mass seen on today's mammogram.      There is no evidence of suspicious masses or other abnormal findings in the right breast.     Impression:  Left  Mass: Left breast mass at the retroareolar position. Assessment: 4 - Suspicious finding.  Ultrasound-guided biopsy is recommended. The patient does not speak grade anguish.  I discussed the results with the patient and her daughter in great detail.  I explained that the palpable area of concern on the left breast represented a previously biopsied benign mass.  The intraductal masses leading to the non spontaneous left bloody nipple discharge are almost certainly related to benign papillomas.  Considering the patient's age I explained that follow-up would be completely reasonable.  The patient was in different regarding follow-up versus biopsy.  The patient's daughter would like to proceed with biopsy.  We spoke for approximately 15 minutes about the results and recommendations and all questions were answered prior to leaving the department.      Right  Mammo Digital Diagnostic with Arvin  There is no mammographic evidence of malignancy in the right breast.     US Breast Limited  There is no sonographic evidence of malignancy in the right breast.     BI-RADS Category:   Overall: 4 - Suspicious  Assessment:       1. Breast neoplasm, Tis (DCIS), left    2. Pre-op testing        Plan:     Options for management were discussed with the patient and her family. We reviewed the existing data noting the equivalency of breast conserving surgery with radiation therapy and mastectomy. We also reviewed the guidelines of the National Comprehensive Cancer Network for Stage 0 breast carcinoma. We discussed the need for lumpectomy margins to be negative for carcinoma, the necessity for postoperative radiation therapy after breast conservation in most cases, the possibility of a failed or false negative sentinel lymph node biopsy and the potential need for complete lymphadenectomy for a failed or positive sentinel lymph node biopsy were fully discussed. In the setting of mastectomy, delayed or immediate reconstruction options are available and were discussed.     In the setting of lumpectomy, radiation therapy would be  recommended majority of the time.  The duration and treatment side effects were discussed with the patient.  This will coordinated with the radiation oncologist pending final pathology.    We also discussed the role of systemic therapy in the treatment of early stage breast cancer.  We discussed that this is based on tumor biology and humera status and will be determined based on final pathology.  We discussed that if the cancer is hormone positive, endocrine therapy would be recommended in most cases and its use can reduce the risk of recurrence as well as improve survival. Side effects of treatment were briefly discussed. We also discussed the potential role for chemotherapy based on a number of factors such as tumor phenotype (ER+ vs. triple negative vs. Otu4gak+) and this would be determined in coordination with the medical oncologist.    The patient, in consultation with her family, has elected to proceed with left partial mastectomy. The operative risks of bleeding, infection, recurrence, scarring, and anesthetic complications and the possibility of requiring further surgery were all noted and informed consent obtained.    Plan for US guided L partial mastectomy and duct excision  Will need clearance with PCP     Surgery scheduled. Follow-up in clinic roughly 14 days after surgery.     Patient was educated on breast cancer, receptors, wire localization lumpectomy, mastectomy, sentinel lymph node mapping and biopsy, axillary lymph node dissection, reconstruction, breast prosthesis with post-mastectomy bra and radiation therapy. Patient was given [facility] information [facility].  All her questions were answered.    Total time spent with the patient: 65 minutes.  45 minutes of face to face consultation and 20 minutes of chart review and coordination of care.

## 2023-01-19 NOTE — Clinical Note
Hi Dr. Badillo,   I am seeing her for a new diagnosis of DCIS in the L breast.  She appears to have an area of abnormality that is likely to cause her some nipple discharge or pain at some point if it progresses.  We would like to excise the area and the surrounding milk duct to relieve her of this problem. Would you be able to clear her for that minor procedure?  Thanks for sending her! Rosmery Cotter MD Breast Surgical Oncology

## 2023-01-20 LAB
COMMENT: NORMAL
FINAL PATHOLOGIC DIAGNOSIS: NORMAL
GROSS: NORMAL
Lab: NORMAL
SUPPLEMENTAL DIAGNOSIS: NORMAL

## 2023-01-24 PROBLEM — D05.12 BREAST NEOPLASM, TIS (DCIS), LEFT: Status: ACTIVE | Noted: 2023-01-24

## 2023-01-25 RX ORDER — SODIUM CHLORIDE 9 MG/ML
INJECTION, SOLUTION INTRAVENOUS CONTINUOUS
Status: CANCELLED | OUTPATIENT
Start: 2023-01-25

## 2023-02-13 ENCOUNTER — ANESTHESIA EVENT (OUTPATIENT)
Dept: SURGERY | Facility: OTHER | Age: 88
End: 2023-02-13
Payer: MEDICARE

## 2023-02-16 ENCOUNTER — HOSPITAL ENCOUNTER (OUTPATIENT)
Dept: PREADMISSION TESTING | Facility: OTHER | Age: 88
Discharge: HOME | End: 2023-02-16
Attending: SURGERY
Payer: MEDICARE

## 2023-02-16 VITALS
OXYGEN SATURATION: 98 % | HEART RATE: 85 BPM | TEMPERATURE: 98 F | SYSTOLIC BLOOD PRESSURE: 142 MMHG | WEIGHT: 123 LBS | BODY MASS INDEX: 23.22 KG/M2 | RESPIRATION RATE: 20 BRPM | HEIGHT: 61 IN | DIASTOLIC BLOOD PRESSURE: 78 MMHG

## 2023-02-16 DIAGNOSIS — Z01.818 PRE-OP TESTING: ICD-10-CM

## 2023-02-16 LAB
ALBUMIN SERPL BCP-MCNC: 3.7 G/DL (ref 3.5–5.2)
ALP SERPL-CCNC: 75 U/L (ref 55–135)
ALT SERPL W/O P-5'-P-CCNC: 12 U/L (ref 10–44)
ANION GAP SERPL CALC-SCNC: 7 MMOL/L (ref 8–16)
AST SERPL-CCNC: 20 U/L (ref 10–40)
BASOPHILS # BLD AUTO: 0.03 K/UL (ref 0–0.2)
BASOPHILS NFR BLD: 0.3 % (ref 0–1.9)
BILIRUB SERPL-MCNC: 0.3 MG/DL (ref 0.1–1)
BUN SERPL-MCNC: 25 MG/DL (ref 8–23)
CALCIUM SERPL-MCNC: 10.8 MG/DL (ref 8.7–10.5)
CHLORIDE SERPL-SCNC: 111 MMOL/L (ref 95–110)
CO2 SERPL-SCNC: 22 MMOL/L (ref 23–29)
CREAT SERPL-MCNC: 1.5 MG/DL (ref 0.5–1.4)
DIFFERENTIAL METHOD: ABNORMAL
EOSINOPHIL # BLD AUTO: 0.1 K/UL (ref 0–0.5)
EOSINOPHIL NFR BLD: 1.5 % (ref 0–8)
ERYTHROCYTE [DISTWIDTH] IN BLOOD BY AUTOMATED COUNT: 15.2 % (ref 11.5–14.5)
EST. GFR  (NO RACE VARIABLE): 33 ML/MIN/1.73 M^2
GLUCOSE SERPL-MCNC: 95 MG/DL (ref 70–110)
HCT VFR BLD AUTO: 36.9 % (ref 37–48.5)
HGB BLD-MCNC: 11.5 G/DL (ref 12–16)
IMM GRANULOCYTES # BLD AUTO: 0.04 K/UL (ref 0–0.04)
IMM GRANULOCYTES NFR BLD AUTO: 0.4 % (ref 0–0.5)
LYMPHOCYTES # BLD AUTO: 1.3 K/UL (ref 1–4.8)
LYMPHOCYTES NFR BLD: 14.2 % (ref 18–48)
MCH RBC QN AUTO: 29.3 PG (ref 27–31)
MCHC RBC AUTO-ENTMCNC: 31.2 G/DL (ref 32–36)
MCV RBC AUTO: 94 FL (ref 82–98)
MONOCYTES # BLD AUTO: 0.8 K/UL (ref 0.3–1)
MONOCYTES NFR BLD: 8.8 % (ref 4–15)
NEUTROPHILS # BLD AUTO: 7 K/UL (ref 1.8–7.7)
NEUTROPHILS NFR BLD: 74.8 % (ref 38–73)
NRBC BLD-RTO: 0 /100 WBC
PLATELET # BLD AUTO: 106 K/UL (ref 150–450)
PMV BLD AUTO: 10.7 FL (ref 9.2–12.9)
POTASSIUM SERPL-SCNC: 4.6 MMOL/L (ref 3.5–5.1)
PROT SERPL-MCNC: 7 G/DL (ref 6–8.4)
RBC # BLD AUTO: 3.93 M/UL (ref 4–5.4)
SODIUM SERPL-SCNC: 140 MMOL/L (ref 136–145)
WBC # BLD AUTO: 9.31 K/UL (ref 3.9–12.7)

## 2023-02-16 PROCEDURE — 36415 COLL VENOUS BLD VENIPUNCTURE: CPT | Performed by: SURGERY

## 2023-02-16 PROCEDURE — 93010 EKG 12-LEAD: ICD-10-PCS | Mod: ,,, | Performed by: INTERNAL MEDICINE

## 2023-02-16 PROCEDURE — 93005 ELECTROCARDIOGRAM TRACING: CPT

## 2023-02-16 PROCEDURE — 93010 ELECTROCARDIOGRAM REPORT: CPT | Mod: ,,, | Performed by: INTERNAL MEDICINE

## 2023-02-16 PROCEDURE — 85025 COMPLETE CBC W/AUTO DIFF WBC: CPT | Performed by: SURGERY

## 2023-02-16 PROCEDURE — 80053 COMPREHEN METABOLIC PANEL: CPT | Performed by: SURGERY

## 2023-02-16 RX ORDER — IPRATROPIUM BROMIDE AND ALBUTEROL SULFATE 2.5; .5 MG/3ML; MG/3ML
3 SOLUTION RESPIRATORY (INHALATION) ONCE
Qty: 75 ML | Refills: 0 | Status: SHIPPED | OUTPATIENT
Start: 2023-02-16 | End: 2023-12-14 | Stop reason: SDUPTHER

## 2023-02-16 RX ORDER — LIDOCAINE HYDROCHLORIDE 10 MG/ML
0.5 INJECTION, SOLUTION EPIDURAL; INFILTRATION; INTRACAUDAL; PERINEURAL ONCE
Status: CANCELLED | OUTPATIENT
Start: 2023-02-16 | End: 2023-02-16

## 2023-02-16 RX ORDER — ACETAMINOPHEN 500 MG
1000 TABLET ORAL
Status: CANCELLED | OUTPATIENT
Start: 2023-02-16 | End: 2023-02-16

## 2023-02-16 RX ORDER — SODIUM CHLORIDE, SODIUM LACTATE, POTASSIUM CHLORIDE, CALCIUM CHLORIDE 600; 310; 30; 20 MG/100ML; MG/100ML; MG/100ML; MG/100ML
INJECTION, SOLUTION INTRAVENOUS CONTINUOUS
Status: CANCELLED | OUTPATIENT
Start: 2023-02-16

## 2023-02-16 NOTE — DISCHARGE INSTRUCTIONS
Information to Prepare you for your Surgery    PRE-ADMIT TESTING -  [phone]    [address] [facility]          Your surgery has been scheduled at [facility]. We are pleased to have the opportunity to serve you. For Further Information please call [phone].    On the day of surgery please report to the Information Desk on the 1st floor.    CONTACT YOUR PHYSICIAN'S OFFICE THE DAY PRIOR TO YOUR SURGERY TO OBTAIN YOUR ARRIVAL TIME.     The evening before surgery do not eat anything after 9 p.m. ( this includes hard candy, chewing gum and mints).  You may only have GATORADE, POWERADE AND WATER  from 9 p.m. until you leave your home.   DO NOT DRINK ANY LIQUIDS ON THE WAY TO THE HOSPITAL.      Why does your anesthesiologist allow you to drink Gatorade/Powerade before surgery?  Gatorade/Powerade helps to increase your comfort before surgery and to decrease your nausea after surgery. The carbohydrates in Gatorade/Powerade help reduce your body's stress response to surgery.  If you are a diabetic-drink only water prior to surgery.      Current Visitor policy(12/27/2021) - Patients may have 2 visitors pre and post procedure. Only 2 visitors will be allowed in the Surgical building with the patient. No one under the age of 12 will be allowed into the facility.    SPECIAL MEDICATION INSTRUCTIONS: TAKE medications checked off by the Anesthesiologist on your Medication List.    Angiogram Patients: Take medications as instructed by your physician, including aspirin.     Surgery Patients:    If you take ASPIRIN - Your PHYSICIAN/SURGEON will need to inform you IF/OR when you need to stop taking aspirin prior to your surgery.     The week prior to surgery do not ot take any medications containing IBUPROFEN or NSAIDS ( Advil, Motrin, Goodys, BC, Aleve, Naproxen etc) If you are not sure if you should take a medicine please call your surgeon's office.  Ok to take  Tylenol    Do Not Wear any make-up (especially eye make-up) to surgery. Please remove any false eyelashes or eyelash extensions. If you arrive the day of surgery with makeup/eyelashes on you will be required to remove prior to surgery. (There is a risk of corneal abrasions if eye makeup/eyelash extensions are not removed)      Leave all valuables at home.   Do Not wear any jewelry or watches, including any metal in body piercings. Jewelry must be removed prior to coming to the hospital.  There is a possibility that rings that are unable to be removed may be cut off if they are on the surgical extremity.    Please remove all hair extensions, wigs, clips and any other metal accessories/ ornaments from your hair.  These items may pose a flammable/fire risk in Surgery and must be removed.    Do not shave your surgical area at least 5 days prior to your surgery. The surgical prep will be performed at the hospital according to Infection Control regulations.    Contact Lens must be removed before surgery. Either do not wear the contact lens or bring a case and solution for storage.  Please bring a container for eyeglasses or dentures as required.  Bring any paperwork your physician has provided, such as consent forms,  history and physicals, doctor's orders, etc.   Bring comfortable clothes that are loose fitting to wear upon discharge. Take into consideration the type of surgery being performed.  Maintain your diet as advised per your physician the day prior to surgery.      Adequate rest the night before surgery is advised.   Park in the Parking lot behind the hospital or in the [facility] across the street from the parking lot. Parking is complimentary.  If you will be discharged the same day as your procedure, please arrange for a responsible adult to drive you home or to accompany you if traveling by taxi.   YOU WILL NOT BE PERMITTED TO DRIVE OR TO LEAVE THE HOSPITAL ALONE AFTER SURGERY.   If you are  being discharged the same day, it is strongly recommended that you arrange for someone to remain with you for the first 24 hrs following your surgery.    The Surgeon will speak to your family/visitor after your surgery regarding the outcome of your surgery and post op care.  The Surgeon may speak to you after your surgery, but there is a possibility you may not remember the details.  Please check with your family members regarding the conversation with the Surgeon.    We strongly recommend whoever is bringing you home be present for discharge instructions.  This will ensure a thorough understanding for your post op home care.    ALL CHILDREN MUST ALWAYS BE ACCOMPANIED BY AN ADULT.    Visitors-Refer to current Visitor policy handouts.    Thank you for your cooperation.  The Staff of [facility].            Bathing Instructions with Hibiclens    Shower the evening before and morning of your procedure with Chlorhexidine (Hibiclens)  do not use Chlorhexidine on your face or genitals. Do not get in your eyes.  Wash your face with water and your regular face wash/soap  Use your regular shampoo  Apply Chlorhexidine (Hibiclens) directly on your skin or on a wet washcloth and wash gently. When showering: Move away from the shower stream when applying Chlorhexidine (Hibiclens) to avoid rinsing off too soon.  Rinse thoroughly with warm water  Do not dilute Chlorhexidine (Hibiclens)   Dry off as usual, do not use any deodorant, powder, body lotions, perfume, after shave or cologne.

## 2023-02-16 NOTE — ANESTHESIA PREPROCEDURE EVALUATION
02/16/2023  Ashely Holman is a 88 y.o., female.      Pre-op Assessment    I have reviewed the Patient Summary Reports.     I have reviewed the Nursing Notes. I have reviewed the NPO Status.   I have reviewed the Medications.     Review of Systems  Anesthesia Hx:  Denies Family Hx of Anesthesia complications.   Denies Personal Hx of Anesthesia complications.   Social:  Non-Smoker    Hematology/Oncology:         -- Anemia: Current/Recent Cancer. Breast   Cardiovascular:   Hypertension CHF (diastolic)    Pulmonary:   COPD, moderate    Renal/:   Chronic Renal Disease, CKD    Hepatic/GI:   PUD,    Musculoskeletal:   Arthritis     Neurological:   CVA    Endocrine:   Diabetes Hypothyroidism        Physical Exam  General: Well nourished, Cooperative, Alert and Oriented    Airway:  Mallampati: II   Mouth Opening: Normal  TM Distance: Normal  Tongue: Normal  Neck ROM: Normal ROM    Dental:  Intact        Anesthesia Plan  Type of Anesthesia, risks & benefits discussed:    Anesthesia Type: Gen ETT  Intra-op Monitoring Plan: Standard ASA Monitors  Post Op Pain Control Plan: multimodal analgesia  Induction:  IV  Airway Plan: Video, Post-Induction  Informed Consent: Informed consent signed with the Patient and all parties understand the risks and agree with anesthesia plan.  All questions answered.   ASA Score: 3  Anesthesia Plan Notes: CBC, BMP, EKG    Day of surgery update: hb 11.5, creat 1.5  EKG SR 82, PACs    Ready For Surgery From Anesthesia Perspective.     .

## 2023-02-27 ENCOUNTER — OFFICE VISIT (OUTPATIENT)
Dept: INTERNAL MEDICINE | Facility: CLINIC | Age: 88
End: 2023-02-27
Attending: INTERNAL MEDICINE
Payer: MEDICARE

## 2023-02-27 ENCOUNTER — DOCUMENTATION ONLY (OUTPATIENT)
Dept: SURGERY | Facility: CLINIC | Age: 88
End: 2023-02-27
Payer: MEDICARE

## 2023-02-27 VITALS
SYSTOLIC BLOOD PRESSURE: 112 MMHG | OXYGEN SATURATION: 96 % | DIASTOLIC BLOOD PRESSURE: 68 MMHG | HEART RATE: 105 BPM | WEIGHT: 127 LBS | BODY MASS INDEX: 23.98 KG/M2 | HEIGHT: 61 IN

## 2023-02-27 DIAGNOSIS — J45.20 MILD INTERMITTENT ASTHMA WITHOUT COMPLICATION: ICD-10-CM

## 2023-02-27 DIAGNOSIS — I10 ESSENTIAL HYPERTENSION: ICD-10-CM

## 2023-02-27 DIAGNOSIS — N18.31 STAGE 3A CHRONIC KIDNEY DISEASE: ICD-10-CM

## 2023-02-27 DIAGNOSIS — E03.9 ACQUIRED HYPOTHYROIDISM: Primary | ICD-10-CM

## 2023-02-27 DIAGNOSIS — D69.6 THROMBOCYTOPENIA: ICD-10-CM

## 2023-02-27 DIAGNOSIS — Z13.39 SCREENING FOR ALCOHOLISM: ICD-10-CM

## 2023-02-27 DIAGNOSIS — M81.0 OSTEOPOROSIS, POST-MENOPAUSAL: ICD-10-CM

## 2023-02-27 DIAGNOSIS — D05.12 BREAST NEOPLASM, TIS (DCIS), LEFT: ICD-10-CM

## 2023-02-27 DIAGNOSIS — Z13.31 SCREENING FOR DEPRESSION: ICD-10-CM

## 2023-02-27 PROCEDURE — 1160F PR REVIEW ALL MEDS BY PRESCRIBER/CLIN PHARMACIST DOCUMENTED: ICD-10-PCS | Mod: CPTII,S$GLB,, | Performed by: INTERNAL MEDICINE

## 2023-02-27 PROCEDURE — 1159F PR MEDICATION LIST DOCUMENTED IN MEDICAL RECORD: ICD-10-PCS | Mod: CPTII,S$GLB,, | Performed by: INTERNAL MEDICINE

## 2023-02-27 PROCEDURE — G0444 PR DEPRESSION SCREENING: ICD-10-PCS | Mod: 59,S$GLB,, | Performed by: INTERNAL MEDICINE

## 2023-02-27 PROCEDURE — 99215 OFFICE O/P EST HI 40 MIN: CPT | Mod: 25,S$GLB,, | Performed by: INTERNAL MEDICINE

## 2023-02-27 PROCEDURE — 99215 PR OFFICE/OUTPT VISIT, EST, LEVL V, 40-54 MIN: ICD-10-PCS | Mod: 25,S$GLB,, | Performed by: INTERNAL MEDICINE

## 2023-02-27 PROCEDURE — G0442 ANNUAL ALCOHOL SCREEN 15 MIN: HCPCS | Mod: 59,S$GLB,, | Performed by: INTERNAL MEDICINE

## 2023-02-27 PROCEDURE — G0444 DEPRESSION SCREEN ANNUAL: HCPCS | Mod: 59,S$GLB,, | Performed by: INTERNAL MEDICINE

## 2023-02-27 PROCEDURE — 1159F MED LIST DOCD IN RCRD: CPT | Mod: CPTII,S$GLB,, | Performed by: INTERNAL MEDICINE

## 2023-02-27 PROCEDURE — G0442 PR  ALCOHOL SCREENING: ICD-10-PCS | Mod: 59,S$GLB,, | Performed by: INTERNAL MEDICINE

## 2023-02-27 PROCEDURE — 1160F RVW MEDS BY RX/DR IN RCRD: CPT | Mod: CPTII,S$GLB,, | Performed by: INTERNAL MEDICINE

## 2023-02-27 RX ORDER — FERROUS GLUCONATE 324(38)MG
324 TABLET ORAL DAILY
Qty: 30 TABLET | Refills: 11 | Status: SHIPPED | OUTPATIENT
Start: 2023-02-27

## 2023-02-27 NOTE — PROGRESS NOTES
"  "Her risk of serious cardiovascular complications 0.5% and her risk of respiratory failure is 0.5%.  The benefits of surgery clearly outweigh the risk.  She is medically optimized for left partial mastectomy." Per Dr. Badillo.        "

## 2023-02-27 NOTE — PATIENT INSTRUCTIONS
The only medication you should take on the morning of surgery is your levothyroxine with a few sips of water.  Do not take the losartan on the morning of surgery.  Take your Breo the night before surgery as usual.

## 2023-02-27 NOTE — PROGRESS NOTES
Subjective:       Patient ID: Ashely Holman is a 88 y.o. female.    Chief Complaint: Pre-op Exam ( L breast removal )    She is scheduled for a partial left mastectomy by Dr. Cotter on March 3, 2023.  She uses Breo every night for asthma, which has been stable.    Hypertension  This is a chronic problem. The current episode started more than 1 year ago. The problem is controlled. Pertinent negatives include no chest pain or shortness of breath.   Review of Systems   Constitutional: Negative.    Respiratory:  Negative for shortness of breath.    Cardiovascular:  Negative for chest pain.       Objective:      Physical Exam  Vitals and nursing note reviewed.   Constitutional:       Appearance: She is well-developed.   HENT:      Head: Normocephalic.   Eyes:      Pupils: Pupils are equal, round, and reactive to light.   Cardiovascular:      Rate and Rhythm: Normal rate and regular rhythm.      Heart sounds: Normal heart sounds.   Pulmonary:      Effort: Pulmonary effort is normal.      Breath sounds: Examination of the right-middle field reveals rales. Examination of the right-lower field reveals rales. Examination of the left-lower field reveals rales. Rales present.   Neurological:      Mental Status: She is alert.       Assessment:       Problem List Items Addressed This Visit          10     Hypothyroid - Primary    Asthma, mild intermittent    CKD (chronic kidney disease) stage 3, GFR 30-59 ml/min    Essential hypertension       Unprioritized    Thrombocytopenia    Osteoporosis, post-menopausal    Breast neoplasm, Tis (DCIS), left         Plan:       Per orders and D/C instructions.  Continue diet and/or meds for asthma, HTN, hypothyroidism, CKD, thrombocytopenia, and osteoporosis, which are stable.    Recent labs and EKG are satisfactory.  Her platelets are 106,000 which is adequate.  She has had mild thrombocytopenia since 2016.  I have advised her to use her Breo the night before surgery as usual.  She will  take her Synthroid the morning of surgery with sips water, but she will not take her losartan.  She should use albuterol nebulizers postoperatively as needed for shortness of breath.  She is ASA class 2.  Her risk of serious cardiovascular complications 0.5% and her risk of respiratory failure is 0.5%.  The benefits of surgery clearly outweigh the risk.  She is medically optimized for left partial mastectomy.    Screening: The patient was screened for depression with the PHQ2 questionnaire and possible health consequences were discussed with the patient, who understands (15 minutes spent).       The patient was screened for the misuse of alcohol, by asking the number of drinks per average week, and if pt has had more than 4 drinks (more than 3 for women and elderly) in 1 day within the past year. The health and legal consequences of misuse were discussed (15 minutes spent).

## 2023-03-02 ENCOUNTER — TELEPHONE (OUTPATIENT)
Dept: SURGERY | Facility: CLINIC | Age: 88
End: 2023-03-02
Payer: MEDICARE

## 2023-03-02 ENCOUNTER — PATIENT MESSAGE (OUTPATIENT)
Dept: SURGERY | Facility: CLINIC | Age: 88
End: 2023-03-02
Payer: MEDICARE

## 2023-03-02 NOTE — TELEPHONE ENCOUNTER
Tried to confirm arrival time for Surgery with  on 03/03/23 at the [facility] with Dr.Amy Cotter. Patient doesn't speak any english . I tried to also reach her daughter Anastasia no answer. Message was left on the cousin  Naima cell phone . To please call Enedelia tejeda @ [phone] [phone]. Message was also sent through  the portal.

## 2023-03-03 ENCOUNTER — HOSPITAL ENCOUNTER (OUTPATIENT)
Facility: OTHER | Age: 88
Discharge: HOME | End: 2023-03-03
Attending: SURGERY | Admitting: SURGERY
Payer: MEDICARE

## 2023-03-03 ENCOUNTER — ANESTHESIA (OUTPATIENT)
Dept: SURGERY | Facility: OTHER | Age: 88
End: 2023-03-03
Payer: MEDICARE

## 2023-03-03 ENCOUNTER — HOSPITAL ENCOUNTER (OUTPATIENT)
Dept: RADIOLOGY | Facility: OTHER | Age: 88
Discharge: HOME | End: 2023-03-03
Attending: SURGERY
Payer: MEDICARE

## 2023-03-03 VITALS
HEART RATE: 55 BPM | DIASTOLIC BLOOD PRESSURE: 65 MMHG | RESPIRATION RATE: 16 BRPM | SYSTOLIC BLOOD PRESSURE: 114 MMHG | OXYGEN SATURATION: 100 % | TEMPERATURE: 97 F

## 2023-03-03 DIAGNOSIS — D05.12 BREAST NEOPLASM, TIS (DCIS), LEFT: ICD-10-CM

## 2023-03-03 PROCEDURE — 88342 IMHCHEM/IMCYTCHM 1ST ANTB: CPT | Mod: 26,59,, | Performed by: PATHOLOGY

## 2023-03-03 PROCEDURE — 88342 CHG IMMUNOCYTOCHEMISTRY: ICD-10-PCS | Mod: 26,59,, | Performed by: PATHOLOGY

## 2023-03-03 PROCEDURE — 76098 PR  X-RAY EXAM, BREAST SPECIMEN: ICD-10-PCS | Mod: 26,,, | Performed by: SURGERY

## 2023-03-03 PROCEDURE — 88360 PR  TUMOR IMMUNOHISTOCHEM/MANUAL: ICD-10-PCS | Mod: 26,,, | Performed by: PATHOLOGY

## 2023-03-03 PROCEDURE — 71000033 HC RECOVERY, INTIAL HOUR: Performed by: SURGERY

## 2023-03-03 PROCEDURE — 36000706: Performed by: SURGERY

## 2023-03-03 PROCEDURE — 71000016 HC POSTOP RECOV ADDL HR: Performed by: SURGERY

## 2023-03-03 PROCEDURE — 88307 TISSUE EXAM BY PATHOLOGIST: CPT | Mod: 26,,, | Performed by: PATHOLOGY

## 2023-03-03 PROCEDURE — 25000003 PHARM REV CODE 250: Performed by: STUDENT IN AN ORGANIZED HEALTH CARE EDUCATION/TRAINING PROGRAM

## 2023-03-03 PROCEDURE — 71000015 HC POSTOP RECOV 1ST HR: Performed by: SURGERY

## 2023-03-03 PROCEDURE — 27201423 OPTIME MED/SURG SUP & DEVICES STERILE SUPPLY: Performed by: SURGERY

## 2023-03-03 PROCEDURE — 88360 TUMOR IMMUNOHISTOCHEM/MANUAL: CPT | Mod: 26,,, | Performed by: PATHOLOGY

## 2023-03-03 PROCEDURE — 88360 TUMOR IMMUNOHISTOCHEM/MANUAL: CPT | Mod: 59 | Performed by: PATHOLOGY

## 2023-03-03 PROCEDURE — 37000008 HC ANESTHESIA 1ST 15 MINUTES: Performed by: SURGERY

## 2023-03-03 PROCEDURE — 88341 PR IHC OR ICC EACH ADD'L SINGLE ANTIBODY  STAINPR: ICD-10-PCS | Mod: 26,59,, | Performed by: PATHOLOGY

## 2023-03-03 PROCEDURE — 88307 TISSUE EXAM BY PATHOLOGIST: CPT | Mod: 59 | Performed by: PATHOLOGY

## 2023-03-03 PROCEDURE — 63600175 PHARM REV CODE 636 W HCPCS: Mod: JG | Performed by: SURGERY

## 2023-03-03 PROCEDURE — 25000242 PHARM REV CODE 250 ALT 637 W/ HCPCS: Performed by: STUDENT IN AN ORGANIZED HEALTH CARE EDUCATION/TRAINING PROGRAM

## 2023-03-03 PROCEDURE — 88342 IMHCHEM/IMCYTCHM 1ST ANTB: CPT | Performed by: PATHOLOGY

## 2023-03-03 PROCEDURE — 88341 IMHCHEM/IMCYTCHM EA ADD ANTB: CPT | Mod: 59 | Performed by: PATHOLOGY

## 2023-03-03 PROCEDURE — 63600175 PHARM REV CODE 636 W HCPCS: Performed by: STUDENT IN AN ORGANIZED HEALTH CARE EDUCATION/TRAINING PROGRAM

## 2023-03-03 PROCEDURE — 19301 PR MASTECTOMY, PARTIAL: ICD-10-PCS | Mod: LT,,, | Performed by: SURGERY

## 2023-03-03 PROCEDURE — 76098 X-RAY EXAM SURGICAL SPECIMEN: CPT | Mod: 26,,, | Performed by: SURGERY

## 2023-03-03 PROCEDURE — 25000003 PHARM REV CODE 250: Performed by: ANESTHESIOLOGY

## 2023-03-03 PROCEDURE — 25000003 PHARM REV CODE 250: Performed by: SURGERY

## 2023-03-03 PROCEDURE — 36000707: Performed by: SURGERY

## 2023-03-03 PROCEDURE — 63600175 PHARM REV CODE 636 W HCPCS: Performed by: ANESTHESIOLOGY

## 2023-03-03 PROCEDURE — 19301 PARTIAL MASTECTOMY: CPT | Mod: LT,,, | Performed by: SURGERY

## 2023-03-03 PROCEDURE — 37000009 HC ANESTHESIA EA ADD 15 MINS: Performed by: SURGERY

## 2023-03-03 PROCEDURE — 88307 PR  SURG PATH,LEVEL V: ICD-10-PCS | Mod: 26,,, | Performed by: PATHOLOGY

## 2023-03-03 PROCEDURE — 76098 X-RAY EXAM SURGICAL SPECIMEN: CPT | Mod: TC

## 2023-03-03 PROCEDURE — 88341 IMHCHEM/IMCYTCHM EA ADD ANTB: CPT | Mod: 26,59,, | Performed by: PATHOLOGY

## 2023-03-03 RX ORDER — SODIUM CHLORIDE 9 MG/ML
INJECTION, SOLUTION INTRAVENOUS CONTINUOUS
Status: DISCONTINUED | OUTPATIENT
Start: 2023-03-03 | End: 2023-03-03 | Stop reason: HOSPADM

## 2023-03-03 RX ORDER — SODIUM CHLORIDE 0.9 % (FLUSH) 0.9 %
3 SYRINGE (ML) INJECTION
Status: DISCONTINUED | OUTPATIENT
Start: 2023-03-03 | End: 2023-03-03 | Stop reason: HOSPADM

## 2023-03-03 RX ORDER — HYDROMORPHONE HYDROCHLORIDE 2 MG/ML
0.4 INJECTION, SOLUTION INTRAMUSCULAR; INTRAVENOUS; SUBCUTANEOUS EVERY 5 MIN PRN
Status: DISCONTINUED | OUTPATIENT
Start: 2023-03-03 | End: 2023-03-03 | Stop reason: HOSPADM

## 2023-03-03 RX ORDER — ACETAMINOPHEN 500 MG
1000 TABLET ORAL
Status: COMPLETED | OUTPATIENT
Start: 2023-03-03 | End: 2023-03-03

## 2023-03-03 RX ORDER — LIDOCAINE HYDROCHLORIDE 20 MG/ML
INJECTION INTRAVENOUS
Status: DISCONTINUED | OUTPATIENT
Start: 2023-03-03 | End: 2023-03-03

## 2023-03-03 RX ORDER — PROPOFOL 10 MG/ML
VIAL (ML) INTRAVENOUS
Status: DISCONTINUED | OUTPATIENT
Start: 2023-03-03 | End: 2023-03-03

## 2023-03-03 RX ORDER — PROCHLORPERAZINE EDISYLATE 5 MG/ML
5 INJECTION INTRAMUSCULAR; INTRAVENOUS EVERY 30 MIN PRN
Status: DISCONTINUED | OUTPATIENT
Start: 2023-03-03 | End: 2023-03-03 | Stop reason: HOSPADM

## 2023-03-03 RX ORDER — SODIUM CHLORIDE, SODIUM LACTATE, POTASSIUM CHLORIDE, CALCIUM CHLORIDE 600; 310; 30; 20 MG/100ML; MG/100ML; MG/100ML; MG/100ML
INJECTION, SOLUTION INTRAVENOUS CONTINUOUS
Status: DISCONTINUED | OUTPATIENT
Start: 2023-03-03 | End: 2023-03-03 | Stop reason: HOSPADM

## 2023-03-03 RX ORDER — LIDOCAINE HYDROCHLORIDE 10 MG/ML
0.5 INJECTION, SOLUTION EPIDURAL; INFILTRATION; INTRACAUDAL; PERINEURAL ONCE
Status: DISCONTINUED | OUTPATIENT
Start: 2023-03-03 | End: 2023-03-03 | Stop reason: HOSPADM

## 2023-03-03 RX ORDER — OXYCODONE HYDROCHLORIDE 5 MG/1
5 TABLET ORAL
Status: DISCONTINUED | OUTPATIENT
Start: 2023-03-03 | End: 2023-03-03 | Stop reason: HOSPADM

## 2023-03-03 RX ORDER — HYDROCODONE BITARTRATE AND ACETAMINOPHEN 5; 325 MG/1; MG/1
1 TABLET ORAL EVERY 6 HOURS PRN
Qty: 8 TABLET | Refills: 0 | Status: SHIPPED | OUTPATIENT
Start: 2023-03-03 | End: 2023-04-17

## 2023-03-03 RX ORDER — METHYLENE BLUE 5 MG/ML
INJECTION INTRAVENOUS
Status: DISCONTINUED | OUTPATIENT
Start: 2023-03-03 | End: 2023-03-03 | Stop reason: HOSPADM

## 2023-03-03 RX ORDER — FENTANYL CITRATE 50 UG/ML
INJECTION, SOLUTION INTRAMUSCULAR; INTRAVENOUS
Status: DISCONTINUED | OUTPATIENT
Start: 2023-03-03 | End: 2023-03-03

## 2023-03-03 RX ORDER — ROCURONIUM BROMIDE 10 MG/ML
INJECTION, SOLUTION INTRAVENOUS
Status: DISCONTINUED | OUTPATIENT
Start: 2023-03-03 | End: 2023-03-03

## 2023-03-03 RX ORDER — PHENYLEPHRINE HYDROCHLORIDE 10 MG/ML
INJECTION INTRAVENOUS
Status: DISCONTINUED | OUTPATIENT
Start: 2023-03-03 | End: 2023-03-03

## 2023-03-03 RX ORDER — DIPHENHYDRAMINE HYDROCHLORIDE 50 MG/ML
12.5 INJECTION INTRAMUSCULAR; INTRAVENOUS EVERY 30 MIN PRN
Status: DISCONTINUED | OUTPATIENT
Start: 2023-03-03 | End: 2023-03-03 | Stop reason: HOSPADM

## 2023-03-03 RX ORDER — BUPIVACAINE HYDROCHLORIDE 2.5 MG/ML
INJECTION, SOLUTION EPIDURAL; INFILTRATION; INTRACAUDAL
Status: DISCONTINUED | OUTPATIENT
Start: 2023-03-03 | End: 2023-03-03 | Stop reason: HOSPADM

## 2023-03-03 RX ORDER — DEXAMETHASONE SODIUM PHOSPHATE 4 MG/ML
INJECTION, SOLUTION INTRA-ARTICULAR; INTRALESIONAL; INTRAMUSCULAR; INTRAVENOUS; SOFT TISSUE
Status: DISCONTINUED | OUTPATIENT
Start: 2023-03-03 | End: 2023-03-03

## 2023-03-03 RX ORDER — CEFAZOLIN SODIUM 1 G/3ML
2 INJECTION, POWDER, FOR SOLUTION INTRAMUSCULAR; INTRAVENOUS
Status: DISCONTINUED | OUTPATIENT
Start: 2023-03-03 | End: 2023-03-03 | Stop reason: HOSPADM

## 2023-03-03 RX ORDER — ONDANSETRON 2 MG/ML
INJECTION INTRAMUSCULAR; INTRAVENOUS
Status: DISCONTINUED | OUTPATIENT
Start: 2023-03-03 | End: 2023-03-03

## 2023-03-03 RX ORDER — ALBUTEROL SULFATE 90 UG/1
AEROSOL, METERED RESPIRATORY (INHALATION)
Status: DISCONTINUED | OUTPATIENT
Start: 2023-03-03 | End: 2023-03-03

## 2023-03-03 RX ORDER — CEFAZOLIN SODIUM 1 G/3ML
INJECTION, POWDER, FOR SOLUTION INTRAMUSCULAR; INTRAVENOUS
Status: DISCONTINUED | OUTPATIENT
Start: 2023-03-03 | End: 2023-03-03

## 2023-03-03 RX ADMIN — SODIUM CHLORIDE, SODIUM LACTATE, POTASSIUM CHLORIDE, AND CALCIUM CHLORIDE: 600; 310; 30; 20 INJECTION, SOLUTION INTRAVENOUS at 06:03

## 2023-03-03 RX ADMIN — SODIUM CHLORIDE, SODIUM LACTATE, POTASSIUM CHLORIDE, AND CALCIUM CHLORIDE: 600; 310; 30; 20 INJECTION, SOLUTION INTRAVENOUS at 08:03

## 2023-03-03 RX ADMIN — PHENYLEPHRINE HYDROCHLORIDE 100 MCG: 10 INJECTION INTRAVENOUS at 07:03

## 2023-03-03 RX ADMIN — ACETAMINOPHEN 1000 MG: 500 TABLET, FILM COATED ORAL at 06:03

## 2023-03-03 RX ADMIN — CEFAZOLIN 2 G: 330 INJECTION, POWDER, FOR SOLUTION INTRAMUSCULAR; INTRAVENOUS at 07:03

## 2023-03-03 RX ADMIN — SUGAMMADEX 200 MG: 100 INJECTION, SOLUTION INTRAVENOUS at 08:03

## 2023-03-03 RX ADMIN — ONDANSETRON HYDROCHLORIDE 4 MG: 2 INJECTION INTRAMUSCULAR; INTRAVENOUS at 08:03

## 2023-03-03 RX ADMIN — DEXAMETHASONE SODIUM PHOSPHATE 8 MG: 4 INJECTION, SOLUTION INTRAMUSCULAR; INTRAVENOUS at 07:03

## 2023-03-03 RX ADMIN — ROCURONIUM BROMIDE 40 MG: 10 INJECTION, SOLUTION INTRAVENOUS at 07:03

## 2023-03-03 RX ADMIN — FENTANYL CITRATE 100 MCG: 50 INJECTION, SOLUTION INTRAMUSCULAR; INTRAVENOUS at 07:03

## 2023-03-03 RX ADMIN — FENTANYL CITRATE 50 MCG: 50 INJECTION, SOLUTION INTRAMUSCULAR; INTRAVENOUS at 08:03

## 2023-03-03 RX ADMIN — FENTANYL CITRATE 25 MCG: 50 INJECTION, SOLUTION INTRAMUSCULAR; INTRAVENOUS at 08:03

## 2023-03-03 RX ADMIN — PHENYLEPHRINE HYDROCHLORIDE 50 MCG: 10 INJECTION INTRAVENOUS at 08:03

## 2023-03-03 RX ADMIN — PHENYLEPHRINE HYDROCHLORIDE 100 MCG: 10 INJECTION INTRAVENOUS at 08:03

## 2023-03-03 RX ADMIN — ALBUTEROL SULFATE 2 PUFF: 90 AEROSOL, METERED RESPIRATORY (INHALATION) at 08:03

## 2023-03-03 RX ADMIN — LIDOCAINE HYDROCHLORIDE 100 MG: 20 INJECTION, SOLUTION INTRAVENOUS at 07:03

## 2023-03-03 RX ADMIN — PROPOFOL 150 MG: 10 INJECTION, EMULSION INTRAVENOUS at 07:03

## 2023-03-03 RX ADMIN — GLYCOPYRROLATE 0.1 MG: 0.2 INJECTION, SOLUTION INTRAMUSCULAR; INTRAVITREAL at 07:03

## 2023-03-03 NOTE — BRIEF OP NOTE
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]    Procedure(s) (LRB):  MASTECTOMY, PARTIAL-Left ultrasound guided (Left)  EXCISIONAL BIOPSY-Left duct (Left)    Anesthesia: General    Operative Findings: left duct excision and partial mastectomy performed using ultrasound guidance; additional lateral and inferior margins taken, nipple core taken    Estimated Blood Loss: 10 cc         Specimens:   Specimen (24h ago, onward)       Start     Ordered    03/03/23 0830  Specimen to Pathology, Surgery Breast  Once        Comments: Pre-op Diagnosis: Breast neoplasm, Tis (DCIS), 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 LEFT LUMPECTOMY WITH END DUCT EXCISION, GREEN INFERIOR, BLUE SUPERIOR, ORANGE LATERAL, YELLOW ANTERIOR, BLACK POSTERIOR, RED MEDIAL, STITCH MARKS END DUCT2. LEFT NIPPLE PORE, INK MUÑOZ NEW MARGIN3. NEW INFERIOR MARGIN, INK MUÑOZ NEW MARGIN4. NEW LATERAL MARGIN, INK MARKS NEW MARGIN     References:    Click here for ordering Quick Tip   Question Answer Comment   Procedure Type: Breast    Specimen Class: Known or suspected malignancy    Which provider would you like to cc? ROSMERY COTTER    Release to patient Immediate        03/03/23 0838                      Discharge Note    OUTCOME: Patient tolerated treatment/procedure well without complication and is now ready for discharge.    DISPOSITION: Home or Self Care    FINAL DIAGNOSIS:  Breast neoplasm, Tis (DCIS), left    FOLLOWUP: In clinic    DISCHARGE INSTRUCTIONS:    Discharge Procedure Orders   Diet Adult Regular     Notify your health care provider if you  experience any of the following:  temperature >100.4     Notify your health care provider if you experience any of the following:  persistent nausea and vomiting or diarrhea     Notify your health care provider if you experience any of the following:  severe uncontrolled pain     Notify your health care provider if you experience any of the following:  redness, tenderness, or signs of infection (pain, swelling, redness, odor or green/yellow discharge around incision site)     Notify your health care provider if you experience any of the following:  difficulty breathing or increased cough     Weight bearing restrictions (specify):   Order Comments: Do not lift, push, or pull greater than 10 lbs for 2 weeks; avoid vigorous activity using your upper body

## 2023-03-03 NOTE — H&P
FOCUSED SURGICAL H&P    Ashely Holman is a 88 y.o. female. MRN is [id number].    CC: Here today for the following surgical procedure(s):  MASTECTOMY, PARTIAL-Left ultrasound guided (Left: Breast)  EXCISIONAL BIOPSY-Left duct (Left)    HPI: For a detailed history of the patients history of present illness please refer to the last progress note. In brief, this is a 88 y.o. 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 There has been no recent changes in the patients health, including fevers, chest pain, or shortness of breath, and no new medications have been started. The patient has not had anything to eat or drink for the last 8 hours.       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 Garcia.    DM2 (diabetes mellitus, type 2) 2/25/2014    Encounter for blood transfusion     Glaucoma     Hypertension     Iron deficiency anemia 2/25/2014    Dr. 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 Ocasio    Post herpetic neuralgia 2/25/2014    Left thigh Dr. Huang    Solitary lung nodule 4/20/2014    Right LL - 4 mm. CT 2007, 2008. CXR 4/14.    Stroke     2015    Thyroid disease        Past Surgical History:   Past Surgical History:   Procedure Laterality Date    BREAST BIOPSY Left     core bx, benign    CATARACT EXTRACTION W/  INTRAOCULAR LENS IMPLANT  2013    left eye    CHOLECYSTECTOMY  09/2016    right thyroidectomy  2004       Social History:   Social History     Socioeconomic History    Marital status: Single    Number of children: 1   Tobacco Use    Smoking status: Never    Smokeless tobacco:  Never   Substance and Sexual Activity    Alcohol use: No    Drug use: No    Sexual activity: Never   Social History Narrative    Uses a walker. Lives with daughter.    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    Only speaks Angolan.    Has a sitter for 3 hours Thursdays.    2022 - she has a sitter 3 days a week.       Family History:   Family History   Problem Relation Age of Onset    Hypertension Mother     Arthritis Father     Hypertension Father     Stroke Father     Hypertension Sister     Alcohol abuse Brother     Cancer Brother         colon    Hypertension Brother     Asthma Daughter     Hypertension Daughter     Arthritis Maternal Aunt     Asthma Paternal Grandmother           Allergies:  Review of patient's allergies indicates:   Allergen Reactions    Lyrica [pregabalin] Rash    Cymbalta [duloxetine] Nausea And Vomiting         Medications:    Current Facility-Administered Medications:     0.9%  NaCl infusion, , Intravenous, Continuous, Rosmery Cotter MD    ceFAZolin injection 2 g, 2 g, Intravenous, On Call Procedure, Rosmery Cotter MD    lactated ringers infusion, , Intravenous, Continuous, Trey Morgan MD    LIDOcaine (PF) 10 mg/ml (1%) injection 5 mg, 0.5 mL, Intradermal, Once, Trey Morgan MD                  Vital Signs:  Vitals:    03/03/23 0605   BP: (!) 162/79   Pulse: 75   Resp: 18   Temp: 97.7 °F (36.5 °C)         Physical Exam:  Neuro: awake, alert, no acute distress.  HEENT: PERRLA, neck supple, no lymphadenopathy.  Heart: regular rate/rhythm  Lungs: equal chest expansion bilaterally, no increased work of breathing on RA  Abdomen: soft, non-distended, non-tender to palpation.  Extremities: warm, well-perfused       Labs:  Lab Results   Component Value Date/Time    WBC 9.31 02/16/2023 03:10 PM    HGB 11.5 (L) 02/16/2023 03:10 PM    HCT 36.9 (L) 02/16/2023 03:10 PM     (L) 02/16/2023 03:10 PM    BAND 5.0 10/18/2015 05:21 AM    MCV 94 02/16/2023 03:10 PM     Lab Results   Component  Value Date/Time     02/16/2023 03:10 PM    K 4.6 02/16/2023 03:10 PM     (H) 02/16/2023 03:10 PM    CO2 22 (L) 02/16/2023 03:10 PM    BUN 25 (H) 02/16/2023 03:10 PM    GLU 95 02/16/2023 03:10 PM    MG 1.8 10/07/2016 05:07 AM    PHOS 3.1 10/07/2016 05:07 AM     Lab Results   Component Value Date/Time    INR 0.9 05/04/2018 08:41 PM     No components found for: TROPI  Lab Results   Component Value Date/Time    ALT 12 02/16/2023 03:10 PM    AST 20 02/16/2023 03:10 PM    LIPASE 212 (H) 10/01/2016 01:35 PM    LIPASE 212 (H) 10/01/2016 01:35 PM          Assessment/Plan:  88 y.o. female here today for the following surgical procedure:    MASTECTOMY, PARTIAL-Left ultrasound guided (Left: Breast)  EXCISIONAL BIOPSY-Left duct (Left)       The indications for surgery, highlighting the risks and benefits of the procedure were discussed with the patient. These included but are not limited to swelling, bleeding, pain, infection, seroma formation, and adverse anesthesia-related event. The patient seems to understand the risks, as well as the alternatives including nonoperative observation/survellience and wishes to proceed with the surgical intervention.    Patient has been examined, consented, and marked for laterality.      Pily Belcher MD  General Surgery, PGY 1

## 2023-03-03 NOTE — TRANSFER OF CARE
Anesthesia Transfer of Care Note    Patient: Ashely Holman    Procedure(s) Performed: Procedure(s) (LRB):  MASTECTOMY, PARTIAL-Left ultrasound guided (Left)  EXCISIONAL BIOPSY-Left duct (Left)    Patient location: PACU    Anesthesia Type: general    Transport from OR: Transported from OR on 2-3 L/min O2 by NC with adequate spontaneous ventilation    Post pain: adequate analgesia    Post assessment: no apparent anesthetic complications and tolerated procedure well    Post vital signs: stable    Level of consciousness: responds to stimulation    Nausea/Vomiting: no nausea/vomiting    Complications: none    Transfer of care protocol was followed      Last vitals:   Visit Vitals  BP (!) 162/79 (BP Location: Right arm, Patient Position: Lying)   Pulse 75   Temp 36.5 °C (97.7 °F) (Oral)   Resp 18   SpO2 97%   Breastfeeding No

## 2023-03-03 NOTE — ANESTHESIA POSTPROCEDURE EVALUATION
Anesthesia Post Evaluation    Patient: Ashely Holman    Procedure(s) Performed: Procedure(s) (LRB):  MASTECTOMY, PARTIAL-Left ultrasound guided (Left)  EXCISIONAL BIOPSY-Left duct (Left)    Final Anesthesia Type: general      Patient location during evaluation: PACU  Patient participation: Yes- Able to Participate  Level of consciousness: awake and alert  Post-procedure vital signs: reviewed and stable  Pain management: adequate  Airway patency: patent  JOHANA mitigation strategies: Extubation while patient is awake  PONV status at discharge: No PONV  Anesthetic complications: no      Cardiovascular status: hemodynamically stable  Respiratory status: unassisted  Hydration status: euvolemic  Follow-up not needed.          Vitals Value Taken Time   /58 03/03/23 0935   Temp 36 °C (96.8 °F) 03/03/23 0902   Pulse 59 03/03/23 0938   Resp 16 03/03/23 0902   SpO2 98 % 03/03/23 0938   Vitals shown include unvalidated device data.      Event Time   Out of Recovery 09:38:47         Pain/Viktor Score: Pain Rating Prior to Med Admin: 0 (3/3/2023  6:27 AM)  Viktor Score: 9 (3/3/2023  9:32 AM)

## 2023-03-03 NOTE — OP NOTE
DATE OF PROCEDURE: 3/3/2023    SURGEON: Surgeon(s) and Role:     * Rosmery Cotter MD - Primary    PREOPERATIVE DIAGNOSIS: DUCTAL CARCINOMA IN SITU of the left breast upper inner quadrant  Bloody nipple discharge    POSTOPERATIVE DIAGNOSIS: same    ANESTHESIA: local and general    PROCEDURES PERFORMED:   1. left breast ultrasound localization partial mastectomy (lumpectomy) with excision for clear margins   2.  Ultrasound guided localization of breast lesion  3. Surgeon interpretation of specimen radiograph  4. End duct excision    PROCEDURE IN DETAIL:   The patient underwent informed consent.  The films were reviewed.    She was then brought to the Operating Room and placed in the supine position. Anesthesia with local/general anesthesia care with sedation was administered.  The left breast, anterior chest, arm and axilla were then prepped and draped in a sterile fashion.     Ultrasound was used to localize the two clips.  Discharge was identified.  The duct was intubated with a lacrimal probe first followed by a 3-0 into the duct followed by a 24 gauge angiocath placed over the suture with a Seldinger technique.  Methylene blue was injected to stain the duct.    Next, we turned our attention to the left breast. Local anesthetic was injected into the area.  An incision was made in the periareolar position of the left breast over the anticipated tract of the lesion. The specimen was dissected circumferentially around the cancer.  We did not dissect all the way down to the underlying pectoralis fascia. The ultrasound localization lumpectomy specimen was inked on the back table using green ink inferiorly, blue ink superiorly, orange ink laterally, yellow ink anteriorly, black ink posteriorly, and the red ink medially with a suture on the end duct.  It was then fixed with acetic acid and submitted for specimen radiograph, which confirmed the clip and area of interest within the specimen. Given the appearance and  location of the lesion, additional margins were taken including inferior past the end duct and lateral as well as a core of the nipple at the blue end duct.       Within the lumpectomy cavity, hemostasis was achieved with cautery. The wound was irrigated until clear. There was no evidence of bleeding. It was closed in multiple layers with deep dermal and subcutaneous interrupted Vicryl sutures and a running 4-0 vicryl subcuticular skin closure.    Dermabond was applied. Sterile fluff gauze was placed and a post-procedure bra was placed. She tolerated the procedure well without complication and was turned over to Anesthesia for transport to the recovery area in a satisfactory condition. All specimens were sent to Pathology for permanent sectioning.    ESTIMATED BLOOD LOSS: 5ml    COMPLICATIONS: none    DISPOSITION:  PACU--hemodynamically stable    ATTESTATION:  I was present and scrubbed for the entire procedure.

## 2023-03-03 NOTE — OR NURSING
Pt Lao speaking, interpretor called @ bedside (#[id number]), pt oriented to PACU and explained that surgery is over. Pt denies pain or nausea @ present.

## 2023-03-03 NOTE — ANESTHESIA PROCEDURE NOTES
Intubation    Date/Time: 3/3/2023 7:10 AM  Performed by: Tesfaye Mc CRNA  Authorized by: Fei Gomez MD     Intubation:     Induction:  Intravenous    Intubated:  Postinduction    Mask Ventilation:  Easy mask    Attempts:  1    Attempted By:  CRNA    Method of Intubation:  Video laryngoscopy    Blade:  Vail 3    Laryngeal View Grade: Grade I - full view of cords      Difficult Airway Encountered?: No      Complications:  None    Airway Device:  Oral endotracheal tube    Airway Device Size:  7.0    Style/Cuff Inflation:  Cuffed (inflated to minimal occlusive pressure)    Tube secured:  22    Secured at:  The lips    Placement Verified By:  Capnometry    Complicating Factors:  None    Findings Post-Intubation:  BS equal bilateral and atraumatic/condition of teeth unchanged

## 2023-03-03 NOTE — PATIENT INSTRUCTIONS
POSTOPERATIVE INSTRUCTIONS FOLLOWING BIOPSY OR LUMPECTOMY    The following are post-operative instructions that will help you to recover from your surgery.  Please read over these instructions carefully and contact us if we can answer any of your questions or concerns.    Dressing/breast binder (surgi-bra)  A surgical bra may be placed around your chest after your surgery.  If you are given the bra, please wear it as close to 24 hours a day as possible until your post-operative clinic appointment.  If the elastic around the bra irritates your skin, you may wear a soft t-shirt underneath the bra.  You may go without wearing the bra long enough to shower, to launder and dry the bra.  If the bra is extremely uncomfortable, you may wear a supportive sports bra instead after 2 days.  You may shower the day after surgery.  Do not take a tub bath and do not soak the surgical site.    Activity   You should be able to return to your regular activities 2 days after your surgery.  However, do not engage in strenuous activities in which you use your upper body such as:  golf, tennis, aerobics, washing windows, raking the yard, mopping, vacuuming, heavy lifting (e.g children) until you are seen for your follow-up appointment in clinic.    Medication for pain  You may find that over the counter pain medications may be sufficient for your pain.  You will be given a prescription for pain medication for more severe pain.  You should not drive or operate machinery while taking these.  Please take narcotics with food.  Narcotics can cause, or worse, constipation.  You will need to increase your fluid intake, eat high fiber foods (such as fruits and bran) and make sure that you are up and walking. You may need to take an over the counter stool softener for constipation.    Please report the following:  Temperature greater than 101 degrees  Discharge or bad odor from the wound  Excessive bleeding, such as bloody dressing or extreme  bruising  Redness at incision and/or drain sites  Swelling or buildup of fluid around incision    Additional information  I will see you approximately 2 weeks following your surgery.  If this follow-up appointment has not been made, please call the office.    If you have any questions or problems, please call my office or my nurse.    MD Jeannie Ambriz, RN  [phone]    After hours and on weekends, you may call the main [facility] line at [phone] and ask to have the general surgery resident paged or have me paged.

## 2023-03-03 NOTE — PLAN OF CARE
Ashely Holman has met all discharge criteria from Phase II. Vital Signs are stable, ambulating  without difficulty. Discharge instructions given, patient verbalized understanding. Discharged from facility via wheelchair in stable condition.

## 2023-03-16 LAB
COMMENT: NORMAL
FINAL PATHOLOGIC DIAGNOSIS: NORMAL
GROSS: NORMAL
Lab: NORMAL
MICROSCOPIC EXAM: NORMAL

## 2023-03-21 ENCOUNTER — OFFICE VISIT (OUTPATIENT)
Dept: SURGERY | Facility: CLINIC | Age: 88
End: 2023-03-21
Payer: MEDICARE

## 2023-03-21 VITALS
SYSTOLIC BLOOD PRESSURE: 191 MMHG | HEIGHT: 61 IN | WEIGHT: 127 LBS | DIASTOLIC BLOOD PRESSURE: 85 MMHG | HEART RATE: 85 BPM | BODY MASS INDEX: 23.98 KG/M2

## 2023-03-21 DIAGNOSIS — Z17.0 MALIGNANT NEOPLASM OF CENTRAL PORTION OF LEFT BREAST IN FEMALE, ESTROGEN RECEPTOR POSITIVE: Primary | ICD-10-CM

## 2023-03-21 DIAGNOSIS — Z12.31 SCREENING MAMMOGRAM, ENCOUNTER FOR: ICD-10-CM

## 2023-03-21 DIAGNOSIS — C50.112 MALIGNANT NEOPLASM OF CENTRAL PORTION OF LEFT BREAST IN FEMALE, ESTROGEN RECEPTOR POSITIVE: Primary | ICD-10-CM

## 2023-03-21 DIAGNOSIS — D05.12 BREAST NEOPLASM, TIS (DCIS), LEFT: ICD-10-CM

## 2023-03-21 PROCEDURE — 1159F MED LIST DOCD IN RCRD: CPT | Mod: CPTII,S$GLB,, | Performed by: SURGERY

## 2023-03-21 PROCEDURE — 1126F PR PAIN SEVERITY QUANTIFIED, NO PAIN PRESENT: ICD-10-PCS | Mod: CPTII,S$GLB,, | Performed by: SURGERY

## 2023-03-21 PROCEDURE — 3288F PR FALLS RISK ASSESSMENT DOCUMENTED: ICD-10-PCS | Mod: CPTII,S$GLB,, | Performed by: SURGERY

## 2023-03-21 PROCEDURE — 99024 PR POST-OP FOLLOW-UP VISIT: ICD-10-PCS | Mod: S$GLB,,, | Performed by: SURGERY

## 2023-03-21 PROCEDURE — 1159F PR MEDICATION LIST DOCUMENTED IN MEDICAL RECORD: ICD-10-PCS | Mod: CPTII,S$GLB,, | Performed by: SURGERY

## 2023-03-21 PROCEDURE — 1101F PT FALLS ASSESS-DOCD LE1/YR: CPT | Mod: CPTII,S$GLB,, | Performed by: SURGERY

## 2023-03-21 PROCEDURE — 1101F PR PT FALLS ASSESS DOC 0-1 FALLS W/OUT INJ PAST YR: ICD-10-PCS | Mod: CPTII,S$GLB,, | Performed by: SURGERY

## 2023-03-21 PROCEDURE — 1126F AMNT PAIN NOTED NONE PRSNT: CPT | Mod: CPTII,S$GLB,, | Performed by: SURGERY

## 2023-03-21 PROCEDURE — 99999 PR PBB SHADOW E&M-EST. PATIENT-LVL III: CPT | Mod: PBBFAC,,, | Performed by: SURGERY

## 2023-03-21 PROCEDURE — 99999 PR PBB SHADOW E&M-EST. PATIENT-LVL III: ICD-10-PCS | Mod: PBBFAC,,, | Performed by: SURGERY

## 2023-03-21 PROCEDURE — 3288F FALL RISK ASSESSMENT DOCD: CPT | Mod: CPTII,S$GLB,, | Performed by: SURGERY

## 2023-03-21 PROCEDURE — 99024 POSTOP FOLLOW-UP VISIT: CPT | Mod: S$GLB,,, | Performed by: SURGERY

## 2023-03-21 NOTE — PROGRESS NOTES
Post-Op  [facility]  Department of Surgery    REFERRING PROVIDER: No referring provider defined for this encounter. ANURADHA Badillo MD  MEDICAL ONCOLOGIST:    pending  RADIATION ONCOLOGIST:   pending    DIAGNOSIS:    This is a 88 y.o. female with a stage pT1a Nx Mx grade 1 ER + DC NA HER2 NA invasive ductal carcinoma with DCIS of the left breast.    TREATMENT SUMMARY:  The patient is status post left partial mastectomy and left duct excision on 3/3/2023.  Final pathology showed multifocal invasive ductal carcinoma with DCIS. All margins negative for invasive carcinoma. Medial margin positive for DCIS and DCIS is <1mm from superior, inferior, and lateral margins. DCIS is 1 mm from inferior margin and 2 mm from anterior margin.    INTERVAL HISTORY:   Ashely Holman comes in for a post-op check.  She denies fever, chills, chest pain or shortness of breath.  Her pain is well controlled.      MEDICATIONS:  Current Outpatient Medications   Medication Sig Dispense Refill    cholecalciferol, vitamin D3, 2,000 unit Cap Take 1 capsule by mouth once daily.      ferrous gluconate (FERGON) 324 MG tablet Take 1 tablet (324 mg total) by mouth once daily. WITH LUNCH 30 tablet 11    fluticasone furoate-vilanteroL (BREO ELLIPTA) 200-25 mcg/dose DsDv diskus inhaler Inhale 1 puff into the lungs once daily. Controller (Patient taking differently: Inhale 1 puff into the lungs nightly. Controller) 60 each 11    HYDROcodone-acetaminophen (NORCO) 5-325 mg per tablet Take 1 tablet by mouth every 6 (six) hours as needed for Pain. 8 tablet 0    levothyroxine (SYNTHROID) 100 MCG tablet TAKE 1 TABLET(100 MCG) BY MOUTH BEFORE BREAKFAST 90 tablet 3    losartan (COZAAR) 100 MG tablet TAKE 1 TABLET(100 MG) BY MOUTH EVERY DAY 90 tablet 3    timolol maleate 0.5% (TIMOPTIC) 0.5 % Drop       travoprost, benzalkonium, (TRAVATAN) 0.004 % ophthalmic solution 1 drop every evening.      albuterol (PROVENTIL) 2.5 mg /3 mL (0.083 %) nebulizer  solution Take 3 mLs (2.5 mg total) by nebulization every 6 (six) hours as needed for Wheezing. 100 each 5    albuterol-ipratropium (DUO-NEB) 2.5 mg-0.5 mg/3 mL nebulizer solution Take 3 mLs by nebulization once. Rescue for 1 dose 75 mL 0     No current facility-administered medications for this visit.       ALLERGIES:   Review of patient's allergies indicates:   Allergen Reactions    Lyrica [pregabalin] Rash    Cymbalta [duloxetine] Nausea And Vomiting       PHYSICAL EXAMINATION:   General:  This is a well appearing female with appropriate speech, affect and gait.     Breast:  Incision clean, dry, and intact      IMPRESSION:   The patient has had an uneventful postoperative course.    PLAN:   1. Will discuss at  due to < 1 mm medial margin  2. return in December for a follow up office visit and breast exam  2. Mammogram in December  3. The patient is advised in continued exam of the breast chest wall and to report to this office sooner should she note any areas of abnormality or concern.   4.  She has been instructed to meet with med onc and rad onc for discussion of adjuvant treatment recommendations

## 2023-03-21 NOTE — Clinical Note
She is recovering well from lumpectomy for early stage breast cancer that appears very favorable.  I expect her to have an excellent prognosis.  Thanks again for sending her! Rosmery Cotter MD Breast Surgical Oncology

## 2023-03-22 ENCOUNTER — DOCUMENTATION ONLY (OUTPATIENT)
Dept: HEMATOLOGY/ONCOLOGY | Facility: CLINIC | Age: 88
End: 2023-03-22
Payer: MEDICARE

## 2023-03-22 ENCOUNTER — PATIENT MESSAGE (OUTPATIENT)
Dept: HEMATOLOGY/ONCOLOGY | Facility: CLINIC | Age: 88
End: 2023-03-22
Payer: MEDICARE

## 2023-03-22 PROBLEM — Z17.0 MALIGNANT NEOPLASM OF CENTRAL PORTION OF LEFT BREAST IN FEMALE, ESTROGEN RECEPTOR POSITIVE: Status: ACTIVE | Noted: 2023-03-22

## 2023-03-22 PROBLEM — C50.112 MALIGNANT NEOPLASM OF CENTRAL PORTION OF LEFT BREAST IN FEMALE, ESTROGEN RECEPTOR POSITIVE: Status: ACTIVE | Noted: 2023-03-22

## 2023-03-22 NOTE — PROGRESS NOTES
Pt met with Dr Cotter for post op appointment.  Med onc and Rad onc appts made, reviewed location, date and time, pt verbalized understanding.  No additional needs at present.   Oncology Navigation   Intake  Date of Diagnosis: 01/12/23  Cancer Type: Breast  Internal / External Referral: Internal  Date of Referral: 12/30/22  Initial Nurse Navigator Contact: 01/18/23  Referral to Initial Contact Timeline (days): 19  Date Worked: 03/22/23  First Appointment Available: 01/19/23  Appointment Date: 01/19/23  First Available Date vs. Scheduled Date (days): 0     Treatment  Current Status: Staging work-up  Date Presented to Tumor Board: 03/28/23    Surgical Oncologist: Vahe  Type of Surgery: left lumpectomy  Consult Date: 01/19/23  Surgery Schedule Date: 03/03/23    Medical Oncologist: Noreen  Consult Date: 03/31/23    Radiation Oncologist: Ly    Procedures: Biopsy  Biopsy Schedule Date: 01/12/23          Radiation Oncologist: Ly    Support Systems: Family members     Acuity  Treatment Tolerability: Has not started treatment yet/treatment fully completed and side effects resolved   Needed: 0  Support: 0  Verbalizes Financial Concerns: 0  Transportation: 0  History of noncompliance/frequent no shows and cancellations: 0  Verbalizes the need for more education: 0  Navigation Acuity: 0     Follow Up  No follow-ups on file.

## 2023-03-28 ENCOUNTER — PATIENT MESSAGE (OUTPATIENT)
Dept: HEMATOLOGY/ONCOLOGY | Facility: CLINIC | Age: 88
End: 2023-03-28
Payer: MEDICARE

## 2023-03-28 ENCOUNTER — TUMOR BOARD CONFERENCE (OUTPATIENT)
Dept: SURGERY | Facility: CLINIC | Age: 88
End: 2023-03-28
Payer: MEDICARE

## 2023-03-28 NOTE — PROGRESS NOTES
Oncology History   Breast neoplasm, Tis (DCIS), left   1/12/2023 Initial Diagnosis    Breast neoplasm, Tis (DCIS), left     1/12/2023 Biopsy    BREAST, LEFT, INTRADUCTAL RETROAREOLAR MASS, BIOPSY:   - Ductal carcinoma in-situ (DCIS), low nuclear grade, cribriform type,   arising in an intraductal papilloma without central necrosis.   - Size of DCIS: 2 MM.   - Microcalcifications: Seen in association with DCIS.   - No invasive carcinoma identified.   - Breast biomarkers are pending and the findings will be included in a   supplemental report.     1/12/2023 Breast Tumor Markers         BREAST BIOMARKERS:   ER:  Positive (100% of tumor cells demonstrate strong nuclear   immunoreactivity).   NE:  Positive (70% of tumor cells demonstrate moderate nuclear   immunoreactivity).   Immunohistochemical staining is interpreted in the setting of appropriate   positive and negative controls.            3/3/2023 Breast Surgery    Left partial mastectomy and SLNB with left end duct excision.        3/22/2023 Cancer Staged    Staging form: Breast, AJCC 8th Edition  - Pathologic stage from 3/22/2023: Stage Unknown (pT1a, pNX, cM0, G1, ER+, NE+, HER2-)       3/28/2023 Tumor Conference    On surgical pathology, multifocal invasive disease with associated low grade DCIS. All margins are negative for invasive disease. Medial margin is positive for DCIS. Superior and posterior are close at less than 1 mm from DCIS. Patient is a poor candidate for mastectomy. Team discussed proceeding with XRT and endocrine vs re-excision. Breast surgery will discuss options with patient. If margins were cleared following re-excision, patient could defer endocrine therapy.

## 2023-03-31 ENCOUNTER — OFFICE VISIT (OUTPATIENT)
Dept: RADIATION ONCOLOGY | Facility: CLINIC | Age: 88
End: 2023-03-31
Payer: MEDICARE

## 2023-03-31 ENCOUNTER — OFFICE VISIT (OUTPATIENT)
Dept: HEMATOLOGY/ONCOLOGY | Facility: CLINIC | Age: 88
End: 2023-03-31
Payer: MEDICARE

## 2023-03-31 VITALS
HEART RATE: 81 BPM | WEIGHT: 118.63 LBS | WEIGHT: 118.63 LBS | BODY MASS INDEX: 22.4 KG/M2 | BODY MASS INDEX: 22.4 KG/M2 | TEMPERATURE: 98 F | DIASTOLIC BLOOD PRESSURE: 89 MMHG | OXYGEN SATURATION: 98 % | HEIGHT: 61 IN | SYSTOLIC BLOOD PRESSURE: 183 MMHG | SYSTOLIC BLOOD PRESSURE: 183 MMHG | HEIGHT: 61 IN | TEMPERATURE: 98 F | HEART RATE: 81 BPM | DIASTOLIC BLOOD PRESSURE: 89 MMHG | OXYGEN SATURATION: 98 % | RESPIRATION RATE: 19 BRPM

## 2023-03-31 DIAGNOSIS — C50.112 MALIGNANT NEOPLASM OF CENTRAL PORTION OF LEFT BREAST IN FEMALE, ESTROGEN RECEPTOR POSITIVE: Primary | ICD-10-CM

## 2023-03-31 DIAGNOSIS — D05.12 BREAST NEOPLASM, TIS (DCIS), LEFT: ICD-10-CM

## 2023-03-31 DIAGNOSIS — D05.12 DUCTAL CARCINOMA IN SITU (DCIS) OF LEFT BREAST: Primary | ICD-10-CM

## 2023-03-31 DIAGNOSIS — Z17.0 MALIGNANT NEOPLASM OF CENTRAL PORTION OF LEFT BREAST IN FEMALE, ESTROGEN RECEPTOR POSITIVE: Primary | ICD-10-CM

## 2023-03-31 PROCEDURE — 99999 PR PBB SHADOW E&M-EST. PATIENT-LVL III: ICD-10-PCS | Mod: PBBFAC,,, | Performed by: RADIOLOGY

## 2023-03-31 PROCEDURE — 1101F PR PT FALLS ASSESS DOC 0-1 FALLS W/OUT INJ PAST YR: ICD-10-PCS | Mod: CPTII,S$GLB,, | Performed by: INTERNAL MEDICINE

## 2023-03-31 PROCEDURE — 1101F PT FALLS ASSESS-DOCD LE1/YR: CPT | Mod: CPTII,S$GLB,, | Performed by: RADIOLOGY

## 2023-03-31 PROCEDURE — 99204 PR OFFICE/OUTPT VISIT, NEW, LEVL IV, 45-59 MIN: ICD-10-PCS | Mod: S$GLB,,, | Performed by: RADIOLOGY

## 2023-03-31 PROCEDURE — 99204 OFFICE O/P NEW MOD 45 MIN: CPT | Mod: S$GLB,,, | Performed by: RADIOLOGY

## 2023-03-31 PROCEDURE — 99999 PR PBB SHADOW E&M-EST. PATIENT-LVL IV: CPT | Mod: PBBFAC,,, | Performed by: INTERNAL MEDICINE

## 2023-03-31 PROCEDURE — 99999 PR PBB SHADOW E&M-EST. PATIENT-LVL III: CPT | Mod: PBBFAC,,, | Performed by: RADIOLOGY

## 2023-03-31 PROCEDURE — 99205 OFFICE O/P NEW HI 60 MIN: CPT | Mod: S$GLB,,, | Performed by: INTERNAL MEDICINE

## 2023-03-31 PROCEDURE — 3288F PR FALLS RISK ASSESSMENT DOCUMENTED: ICD-10-PCS | Mod: CPTII,S$GLB,, | Performed by: RADIOLOGY

## 2023-03-31 PROCEDURE — 1126F AMNT PAIN NOTED NONE PRSNT: CPT | Mod: CPTII,S$GLB,, | Performed by: RADIOLOGY

## 2023-03-31 PROCEDURE — 1126F PR PAIN SEVERITY QUANTIFIED, NO PAIN PRESENT: ICD-10-PCS | Mod: CPTII,S$GLB,, | Performed by: INTERNAL MEDICINE

## 2023-03-31 PROCEDURE — 3288F FALL RISK ASSESSMENT DOCD: CPT | Mod: CPTII,S$GLB,, | Performed by: RADIOLOGY

## 2023-03-31 PROCEDURE — 99205 PR OFFICE/OUTPT VISIT, NEW, LEVL V, 60-74 MIN: ICD-10-PCS | Mod: S$GLB,,, | Performed by: INTERNAL MEDICINE

## 2023-03-31 PROCEDURE — 99999 PR PBB SHADOW E&M-EST. PATIENT-LVL IV: ICD-10-PCS | Mod: PBBFAC,,, | Performed by: INTERNAL MEDICINE

## 2023-03-31 PROCEDURE — 1160F PR REVIEW ALL MEDS BY PRESCRIBER/CLIN PHARMACIST DOCUMENTED: ICD-10-PCS | Mod: CPTII,S$GLB,, | Performed by: RADIOLOGY

## 2023-03-31 PROCEDURE — 1159F PR MEDICATION LIST DOCUMENTED IN MEDICAL RECORD: ICD-10-PCS | Mod: CPTII,S$GLB,, | Performed by: RADIOLOGY

## 2023-03-31 PROCEDURE — 3288F PR FALLS RISK ASSESSMENT DOCUMENTED: ICD-10-PCS | Mod: CPTII,S$GLB,, | Performed by: INTERNAL MEDICINE

## 2023-03-31 PROCEDURE — 3288F FALL RISK ASSESSMENT DOCD: CPT | Mod: CPTII,S$GLB,, | Performed by: INTERNAL MEDICINE

## 2023-03-31 PROCEDURE — 1160F RVW MEDS BY RX/DR IN RCRD: CPT | Mod: CPTII,S$GLB,, | Performed by: RADIOLOGY

## 2023-03-31 PROCEDURE — 1159F MED LIST DOCD IN RCRD: CPT | Mod: CPTII,S$GLB,, | Performed by: RADIOLOGY

## 2023-03-31 PROCEDURE — 1126F AMNT PAIN NOTED NONE PRSNT: CPT | Mod: CPTII,S$GLB,, | Performed by: INTERNAL MEDICINE

## 2023-03-31 PROCEDURE — 1126F PR PAIN SEVERITY QUANTIFIED, NO PAIN PRESENT: ICD-10-PCS | Mod: CPTII,S$GLB,, | Performed by: RADIOLOGY

## 2023-03-31 PROCEDURE — 1101F PR PT FALLS ASSESS DOC 0-1 FALLS W/OUT INJ PAST YR: ICD-10-PCS | Mod: CPTII,S$GLB,, | Performed by: RADIOLOGY

## 2023-03-31 PROCEDURE — 1101F PT FALLS ASSESS-DOCD LE1/YR: CPT | Mod: CPTII,S$GLB,, | Performed by: INTERNAL MEDICINE

## 2023-03-31 NOTE — PROGRESS NOTES
[facility]/ [facility]   at [facility] Note:      Chief Complaint:   Encounter Diagnosis   Name Primary?    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 female who presents today for evaluation of newly diagnosed DCIS.     Oncology History  Mammogram (2022) showed oval mass with circumscribed margins seen in the left breast at 12 o'clock (known fibroadenoma) and bloody nipple discharge on mammographic compression. Subsequent US showed a chain intraductal masses in one of the ductal systems leading to the nipple measuring approximately 5 mm x 4 mm x 5 mm.   Ultrasound guided biopsy 2023 with pathology revealing DCIS of the breast arising in an intraductal papilloma, grade 1, ER:  Positive (100%)/ CA:  Positive (70%)    L breast lumpectomy 3/23/23: multifocal IDC, largest measuring 3mm, grade 1; no LN resected;   ER: Positive (%, strong)   CA:  Positive (%, strong   HER2:  Negative (Score 0)   Ki-67:  5%           GYN History:  Age of menarche was 10.   Patient denies hormonal therapy.   Patient is . Age of first live birth was 20. Patient did breast feed.      Social History     Tobacco Use    Smoking status: Never    Smokeless tobacco: Never   Substance Use Topics    Alcohol use: No    Drug use: No     Family History   Problem Relation Age of Onset    Hypertension Mother     Arthritis Father     Hypertension Father     Stroke Father     Hypertension Sister     Alcohol abuse Brother     Cancer Brother         colon    Hypertension Brother     Asthma Daughter     Hypertension Daughter     Arthritis Maternal Aunt     Asthma Paternal Grandmother      Past Medical  History:   Diagnosis Date    Asthma 4/20/2014    Breast neoplasm, Tis (DCIS), left 1/24/2023    CKD (chronic kidney disease) 2/25/2014    COPD (chronic obstructive pulmonary disease)     Diastolic dysfunction 4/22/2014    Echo 4/14    DJD (degenerative joint disease) of knee 2/25/2014    Severe Dr. Garcia.    DM2 (diabetes mellitus, type 2) 2/25/2014    Encounter for blood transfusion     Glaucoma     Hypertension     Iron deficiency anemia 2/25/2014    Dr. 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 Ocasio    Post herpetic neuralgia 2/25/2014    Left thigh Dr. Huang    Solitary lung nodule 4/20/2014    Right LL - 4 mm. CT 2007, 2008. CXR 4/14.    Stroke     2015    Thyroid disease      Past Surgical History:   Procedure Laterality Date    BREAST BIOPSY Left     core bx, benign    CATARACT EXTRACTION W/  INTRAOCULAR LENS IMPLANT  2013    left eye    CHOLECYSTECTOMY  09/2016    EXCISIONAL BIOPSY Left 3/3/2023    Procedure: EXCISIONAL BIOPSY;  Surgeon: Rosmery Cotter MD;  [facility]: [facility];  Service: General;  Laterality: Left;  -Left duct    MASTECTOMY, PARTIAL Left 3/3/2023    Procedure: MASTECTOMY, PARTIAL;  Surgeon: Rosmery Cotter MD;  Location: [facility] OR;  Service: General;  Laterality: Left;  -Left ultrasound guided    right thyroidectomy  2004       Patient Active Problem List   Diagnosis    Iron deficiency anemia    DJD (degenerative joint disease) of knee    Post herpetic neuralgia    History of diet-controlled diabetes    Hypothyroid    Solitary lung nodule    Diastolic dysfunction    Thrombocytopenia    Low back pain    Compression fracture of L2    Renal cyst, left    Asthma, mild intermittent    Blood type O+    Osteoporosis, post-menopausal    CKD (chronic kidney disease) stage 3, GFR 30-59 ml/min    Gastric ulcer    Pharyngeal dysphagia    Pancreatitis    Essential hypertension    Glaucoma    Hyperparathyroidism due to renal insufficiency     "Breast neoplasm, Tis (DCIS), left    Malignant neoplasm of central portion of left breast in female, estrogen receptor positive       Current Outpatient Medications   Medication Instructions    albuterol (PROVENTIL) 2.5 mg, Nebulization, Every 6 hours PRN    albuterol-ipratropium (DUO-NEB) 2.5 mg-0.5 mg/3 mL nebulizer solution 3 mLs, Nebulization, Once, Rescue    cholecalciferol, vitamin D3, 2,000 unit Cap 1 capsule, Oral, Daily    ferrous gluconate (FERGON) 324 mg, Oral, Daily, WITH LUNCH    fluticasone furoate-vilanteroL (BREO ELLIPTA) 200-25 mcg/dose DsDv diskus inhaler 1 puff, Inhalation, Daily, Controller    HYDROcodone-acetaminophen (NORCO) 5-325 mg per tablet 1 tablet, Oral, Every 6 hours PRN    levothyroxine (SYNTHROID) 100 MCG tablet TAKE 1 TABLET(100 MCG) BY MOUTH BEFORE BREAKFAST    losartan (COZAAR) 100 MG tablet TAKE 1 TABLET(100 MG) BY MOUTH EVERY DAY    timolol maleate 0.5% (TIMOPTIC) 0.5 % Drop No dose, route, or frequency recorded.    travoprost, benzalkonium, (TRAVATAN) 0.004 % ophthalmic solution 1 drop, Nightly       Review of Systems:   Review of Systems   Constitutional: Negative.    Respiratory:  Negative for cough and shortness of breath.    Cardiovascular:  Negative for chest pain.   Gastrointestinal:  Negative for abdominal pain and diarrhea.   Genitourinary:  Negative for frequency.   Musculoskeletal:  Negative for back pain.   Skin:  Negative for rash.   Neurological:  Negative for headaches.   Psychiatric/Behavioral:  The patient is not nervous/anxious.    All other systems reviewed and are negative.    PHYSICAL EXAM:  BP (!) 183/89   Pulse 81   Temp 98.2 °F (36.8 °C) (Oral)   Resp 19   Ht 5' 1" (1.549 m)   Wt 53.8 kg (118 lb 9.7 oz)   SpO2 98%   BMI 22.41 kg/m²     General Appearance:    Alert, cooperative, no distress, appears stated age   Head:    Normocephalic, without obvious abnormality, atraumatic   Throat:   Lips, mucosa, and tongue normal; teeth and gums normal "   Extremities:   Extremities normal, atraumatic, no cyanosis or edema   Pulses:   2+ and symmetric all extremities   Skin:   Skin color, texture, turgor normal, no rashes or lesions           Pertinent Labs & Imaging:  Specimen (730h ago, onward)      None            No results found for this or any previous visit (from the past 24 hour(s)).    Assessment & Plan:    1. Ductal carcinoma in situ (DCIS) of left breast      Reviewed patients referring notes, imaging and pathology. Discussed diagnosis, staging, and treatment in detail with patient.   Patient with T1a low grade Stage I ER+ breast cancer in the setting of advanced age and dementia  Given age and risk models (PredictUK), minimal OS benefit (0.1%) of adding endocrine therapy   Patient with meet with radiation oncology and surgery for further adjuvant treatment for positive margins  Continue routine follow up with surgery. No indication for further treatment from medical oncology     Route Chart for Scheduling    Med Onc Chart Routing      Follow up with physician No follow up needed. follow up with surgery and radiation oncology   Follow up with [facility]    Infusion scheduling note    Injection scheduling note    Labs    Imaging    Pharmacy appointment    Other referrals                MDM includes  :    - Acute or chronic illness or injury that poses a threat to life or bodily function  - Review of prior external notes from unique source  - Independent review and explanation of 3+ results from unique tests  - Extensive discussion of treatment and management  - Discussion of results with another health care professional        Jeanne Sorto MD   03/31/2023

## 2023-03-31 NOTE — Clinical Note
Given age and dementia, no indication for adjuvant endocrine therapy.I told her she can follow up with surgery team

## 2023-03-31 NOTE — PROGRESS NOTES
REFERRING PHYSICIAN:   Rosmery Cotter MD, Jeanne Sorto MD    DIAGNOSIS:  pmT1a Nx M0, stage IA, invasive ductal carcinoma the left breast    HISTORY OF PRESENT ILLNESS:   Ms. Holman is an 89-year-old female with dimentia who was diagnosed with left breast cancer after evaluation for a palpable mass in the central aspect of the left breast.  Mammogram and ultrasound on 2023 revealed an oval mass corresponding to the palpable lesion.  This measured 2 x 1.1 x 2.5 cm.  A core needle biopsy of this lesion on 2023 was consistent with low nuclear grade ductal carcinoma in-situ, which is ER positive (100%) and AR positive (70%).  She underwent lumpectomy on March 3, 2023.  Pathology revealed the left breast with multifocal, grade 1, invasive ductal carcinoma measuring 0.3 cm, 0.15 cm, and 1 mm with associated grade 1, DCIS, measuring 40 mm.  The closest margin from invasive carcinoma is 2 mm anteriorly and medially and the medial margin from DCIS is positive for carcinoma over an area of 1 mm.  Superior and posterior margins are less than 1 mm.  She is here today for recommendations regarding further treatment.    At present, patient is healing from surgery without any unexpected side effects.  She denies left breast pain, edema, erythema, nipple discharge.  She also denies fever, night sweats, or weight loss. Patient is Estonian speaking and is here today with her daughter who is the power .     REVIEW OF SYSTEMS:  As above.  In addition, patient denies headaches, visual problems, dizziness, chest pain, shortness of breath, cough, nausea, vomiting, diarrhea, or any new bony pains. Patient also denies easy bruising, skin rashes, or numbness or tingling.    GYN HISTORY:   Menarche at age 10.  .  She denies hormone replacement therapy.    ECO-2    PAST MEDICAL HISTORY:  Past Medical History:   Diagnosis Date    Asthma 2014    Breast neoplasm, Tis (DCIS), left 2023    CKD  (chronic kidney disease) 2/25/2014    COPD (chronic obstructive pulmonary disease)     Diastolic dysfunction 4/22/2014    Echo 4/14    DJD (degenerative joint disease) of knee 2/25/2014    Severe Dr. Garcia.    DM2 (diabetes mellitus, type 2) 2/25/2014    Encounter for blood transfusion     Glaucoma     Hypertension     Iron deficiency anemia 2/25/2014    Dr. 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 Ocasio    Post herpetic neuralgia 2/25/2014    Left thigh Dr. Huang    Solitary lung nodule 4/20/2014    Right LL - 4 mm. CT 2007, 2008. CXR 4/14.    Stroke     2015    Thyroid disease        PAST SURGICAL HISTORY:  Past Surgical History:   Procedure Laterality Date    BREAST BIOPSY Left     core bx, benign    CATARACT EXTRACTION W/  INTRAOCULAR LENS IMPLANT  2013    left eye    CHOLECYSTECTOMY  09/2016    EXCISIONAL BIOPSY Left 3/3/2023    Procedure: EXCISIONAL BIOPSY;  Surgeon: Rosmery Cotter MD;  Location: [facility] OR;  Service: General;  Laterality: Left;  -Left duct    MASTECTOMY, PARTIAL Left 3/3/2023    Procedure: MASTECTOMY, PARTIAL;  Surgeon: Rosmery Cotter MD;  Location: [facility] OR;  Service: General;  Laterality: Left;  -Left ultrasound guided    right thyroidectomy  2004       ALLERGIES:   Review of patient's allergies indicates:   Allergen Reactions    Lyrica [pregabalin] Rash    Cymbalta [duloxetine] Nausea And Vomiting       MEDICATIONS:  Current Outpatient Medications   Medication Sig    albuterol (PROVENTIL) 2.5 mg /3 mL (0.083 %) nebulizer solution Take 3 mLs (2.5 mg total) by nebulization every 6 (six) hours as needed for Wheezing.    albuterol-ipratropium (DUO-NEB) 2.5 mg-0.5 mg/3 mL nebulizer solution Take 3 mLs by nebulization once. Rescue for 1 dose    cholecalciferol, vitamin D3, 2,000 unit Cap Take 1 capsule by mouth once daily.    ferrous gluconate (FERGON) 324 MG tablet Take 1 tablet (324 mg total) by mouth once daily. WITH LUNCH     "fluticasone furoate-vilanteroL (BREO ELLIPTA) 200-25 mcg/dose DsDv diskus inhaler Inhale 1 puff into the lungs once daily. Controller (Patient taking differently: Inhale 1 puff into the lungs nightly. Controller)    HYDROcodone-acetaminophen (NORCO) 5-325 mg per tablet Take 1 tablet by mouth every 6 (six) hours as needed for Pain.    levothyroxine (SYNTHROID) 100 MCG tablet TAKE 1 TABLET(100 MCG) BY MOUTH BEFORE BREAKFAST    losartan (COZAAR) 100 MG tablet TAKE 1 TABLET(100 MG) BY MOUTH EVERY DAY    timolol maleate 0.5% (TIMOPTIC) 0.5 % Drop     travoprost, benzalkonium, (TRAVATAN) 0.004 % ophthalmic solution 1 drop every evening.     No current facility-administered medications for this visit.       SOCIAL HISTORY:  Social History     Socioeconomic History    Marital status: Single    Number of children: 1   Tobacco Use    Smoking status: Never    Smokeless tobacco: Never   Substance and Sexual Activity    Alcohol use: No    Drug use: No    Sexual activity: Never   Social History Narrative    Uses a walker. Lives with daughter.     at [employer] for 30 years.    Only speaks Chinese.    Has a sitter for 3 hours Thursdays.    2022 - she has a sitter 3 days a week.       FAMILY HISTORY:  Family History   Problem Relation Age of Onset    Hypertension Mother     Arthritis Father     Hypertension Father     Stroke Father     Hypertension Sister     Alcohol abuse Brother     Cancer Brother         colon    Hypertension Brother     Asthma Daughter     Hypertension Daughter     Arthritis Maternal Aunt     Asthma Paternal Grandmother          PHYSICAL EXAMINATION:  Vitals:    03/31/23 1325   BP: (!) 183/89   Patient Position: Sitting   Pulse: 81   Temp: 98.2 °F (36.8 °C)   TempSrc: Oral   SpO2: 98%   Weight: 53.8 kg (118 lb 9.7 oz)   Height: 5' 1" (1.549 m)   Body mass index is 22.41 kg/m².    GENERAL: Patient is alert and oriented, in no acute distress.  HEENT:Extraocular muscles are intact.  Oropharynx is clear " without lesions.  There is no cervical or supraclavicular lymphadenopathy palpated.  No thyromegaly noted.  HEART: Regular rate and rhythm.  LUNGS: Clear to auscultation bilaterally.  BREAST EXAM:  The scar secondary to lumpectomy is noted in the amilcar areolar region of the left breast. There is fibrosis palpated associated with the scar. No abnormal masses palpated in the left breast or left axilla.  ABDOMEN:Soft, nontender, nondistended, without hepatosplenomegaly.  Normoactive bowel sounds.  EXTREMITIES: No clubbing, cyanosis, or edema.  NEUROLOGICAL: Cranial nerve II through XII grossly intact.  Sensation is intact.  Strength is 5 out of 5 in the upper and lower extremities bilaterally.     ASSESSMENT:   This is an 89-year-old female with multifocal pT1a Nx M0, stage IA, grade 1, invasive ductal carcinoma of the left breast who underwent lumpectomy on March 3, 2023 with 3 subcentimeter foci of invasive carcinoma associated with 40 mm area of DCIS, with pathology revealing positive medial margin and multiple close margins from DCIS, ER/KS positive.    PLAN:   After discussion in the multidisciplinary breast conference and review of the pathology and images of the radiological studies, Ms. Holman is noted to have positive medial margin and superior and medial margin at less than 1 mm from DCIS.  After discussion the breast Conference, recommendation is to discuss re-excision with the patient to obtain negative margins. She is scheduled to follow up with Dr. Vahe hernandez for further discussion regarding that. If she is able to get negative margins, post operative radiation could be omitted. If not, I discussed the option of partial breast irradiation vs. Observation in detail with her daughter given the patients history of dementia and KPS. All questions were answered. I plan to deliver approximately 3000 cGu in 5 fractions if she required radiation. I plan to followup with her final decision.      The risks,  benefits, and side effects of radiation were explained in detail to the patient and her daughter who is the power of .  All questions were answered and informed consent was signed.  I plan to see the patient back for radiation planning CT if warranted.    Psychosocial Distress screening score of Distress Score: 0 - No Distress noted and reviewed. No intervention indicated.     I spent approximately 60 minutes reviewing the available records and evaluating the patient, out of which over 50% of the time was spent face to face with the patient in counseling and coordinating this patient's care.

## 2023-03-31 NOTE — Clinical Note
Follow up with path after reexcision
[facility]  ENT  [address], [address]  [place] [address]  Phone: [phone]  Fax:   Follow Up Time: Urgent

## 2023-04-06 ENCOUNTER — OFFICE VISIT (OUTPATIENT)
Dept: SURGERY | Facility: CLINIC | Age: 88
End: 2023-04-06
Payer: MEDICARE

## 2023-04-06 VITALS
BODY MASS INDEX: 22.84 KG/M2 | WEIGHT: 121 LBS | HEART RATE: 74 BPM | DIASTOLIC BLOOD PRESSURE: 72 MMHG | HEIGHT: 61 IN | SYSTOLIC BLOOD PRESSURE: 155 MMHG | OXYGEN SATURATION: 96 %

## 2023-04-06 DIAGNOSIS — Z17.0 MALIGNANT NEOPLASM OF CENTRAL PORTION OF LEFT BREAST IN FEMALE, ESTROGEN RECEPTOR POSITIVE: Primary | ICD-10-CM

## 2023-04-06 DIAGNOSIS — C50.112 MALIGNANT NEOPLASM OF CENTRAL PORTION OF LEFT BREAST IN FEMALE, ESTROGEN RECEPTOR POSITIVE: Primary | ICD-10-CM

## 2023-04-06 PROCEDURE — 99999 PR PBB SHADOW E&M-EST. PATIENT-LVL IV: ICD-10-PCS | Mod: PBBFAC,,, | Performed by: SURGERY

## 2023-04-06 PROCEDURE — 99024 POSTOP FOLLOW-UP VISIT: CPT | Mod: S$GLB,,, | Performed by: SURGERY

## 2023-04-06 PROCEDURE — 99999 PR PBB SHADOW E&M-EST. PATIENT-LVL IV: CPT | Mod: PBBFAC,,, | Performed by: SURGERY

## 2023-04-06 PROCEDURE — 1101F PR PT FALLS ASSESS DOC 0-1 FALLS W/OUT INJ PAST YR: ICD-10-PCS | Mod: CPTII,S$GLB,, | Performed by: SURGERY

## 2023-04-06 PROCEDURE — 1159F MED LIST DOCD IN RCRD: CPT | Mod: CPTII,S$GLB,, | Performed by: SURGERY

## 2023-04-06 PROCEDURE — 3288F PR FALLS RISK ASSESSMENT DOCUMENTED: ICD-10-PCS | Mod: CPTII,S$GLB,, | Performed by: SURGERY

## 2023-04-06 PROCEDURE — 1160F PR REVIEW ALL MEDS BY PRESCRIBER/CLIN PHARMACIST DOCUMENTED: ICD-10-PCS | Mod: CPTII,S$GLB,, | Performed by: SURGERY

## 2023-04-06 PROCEDURE — 1159F PR MEDICATION LIST DOCUMENTED IN MEDICAL RECORD: ICD-10-PCS | Mod: CPTII,S$GLB,, | Performed by: SURGERY

## 2023-04-06 PROCEDURE — 3288F FALL RISK ASSESSMENT DOCD: CPT | Mod: CPTII,S$GLB,, | Performed by: SURGERY

## 2023-04-06 PROCEDURE — 1126F PR PAIN SEVERITY QUANTIFIED, NO PAIN PRESENT: ICD-10-PCS | Mod: CPTII,S$GLB,, | Performed by: SURGERY

## 2023-04-06 PROCEDURE — 1160F RVW MEDS BY RX/DR IN RCRD: CPT | Mod: CPTII,S$GLB,, | Performed by: SURGERY

## 2023-04-06 PROCEDURE — 1101F PT FALLS ASSESS-DOCD LE1/YR: CPT | Mod: CPTII,S$GLB,, | Performed by: SURGERY

## 2023-04-06 PROCEDURE — 99024 PR POST-OP FOLLOW-UP VISIT: ICD-10-PCS | Mod: S$GLB,,, | Performed by: SURGERY

## 2023-04-06 PROCEDURE — 1126F AMNT PAIN NOTED NONE PRSNT: CPT | Mod: CPTII,S$GLB,, | Performed by: SURGERY

## 2023-04-06 NOTE — H&P (VIEW-ONLY)
[facility]       Post-Op        REFERRING PHYSICIAN:  No referring provider defined for this encounter.       ANURADHA Badillo MD    MEDICAL ONCOLOGIST:    Jeanne Sorto MD  RADIATION ONCOLOGIST:   Avelina Modi MD    DIAGNOSIS:    This is a 88 y.o. female with a stage pT1a Nx Mx grade 1 ER + NM NA HER2 NA invasive ductal carcinoma with DCIS of the left breast.     TREATMENT SUMMARY:  The patient is status post left partial mastectomy and left duct excision on 3/3/2023.  Final pathology showed multifocal invasive ductal carcinoma with DCIS. All margins negative for invasive carcinoma. Medial margin positive for DCIS and DCIS is <1mm from superior, inferior, and lateral margins. DCIS is 1 mm from inferior margin and 2 mm from anterior margin.    INTERVAL HISTORY:   Ashely Holman presents for discussion of treatment options given the positive DCIS margins on pathology.     Her case was discussed at tumor conference on 3/28:  On surgical pathology, multifocal invasive disease with associated low grade DCIS. All margins are negative for invasive disease. Medial margin is positive for DCIS. Superior and posterior are close at less than 1 mm from DCIS. Patient is a poor candidate for mastectomy. Team discussed proceeding with XRT and endocrine vs re-excision. Breast surgery will discuss options with patient. If margins were cleared following re-excision, patient could defer endocrine therapy.    MEDICATIONS:  Current Outpatient Medications   Medication Sig Dispense Refill    albuterol (PROVENTIL) 2.5 mg /3 mL (0.083 %) nebulizer solution Take 3 mLs (2.5 mg total) by nebulization every 6 (six) hours as needed for Wheezing. 100 each 5    albuterol-ipratropium (DUO-NEB) 2.5 mg-0.5 mg/3 mL nebulizer solution Take 3 mLs by nebulization once. Rescue for 1 dose 75 mL 0    cholecalciferol, vitamin D3, 2,000 unit Cap Take 1 capsule by mouth once daily.      ferrous gluconate (FERGON) 324 MG tablet Take 1 tablet (324  mg total) by mouth once daily. WITH LUNCH 30 tablet 11    fluticasone furoate-vilanteroL (BREO ELLIPTA) 200-25 mcg/dose DsDv diskus inhaler Inhale 1 puff into the lungs once daily. Controller (Patient taking differently: Inhale 1 puff into the lungs nightly. Controller) 60 each 11    HYDROcodone-acetaminophen (NORCO) 5-325 mg per tablet Take 1 tablet by mouth every 6 (six) hours as needed for Pain. 8 tablet 0    levothyroxine (SYNTHROID) 100 MCG tablet TAKE 1 TABLET(100 MCG) BY MOUTH BEFORE BREAKFAST 90 tablet 3    losartan (COZAAR) 100 MG tablet TAKE 1 TABLET(100 MG) BY MOUTH EVERY DAY 90 tablet 3    timolol maleate 0.5% (TIMOPTIC) 0.5 % Drop       travoprost, benzalkonium, (TRAVATAN) 0.004 % ophthalmic solution 1 drop every evening.       No current facility-administered medications for this visit.       ALLERGIES:   Review of patient's allergies indicates:   Allergen Reactions    Lyrica [pregabalin] Rash    Cymbalta [duloxetine] Nausea And Vomiting       PHYSICAL EXAMINATION:   General:  This is a well appearing female with appropriate speech, affect and gait.     Breast:  Incision clean, dry, and intact    IMPRESSION:   The patient has had an uneventful postoperative course.    PLAN:   1. Planning re-excision of positive margin.   2. Will be in contact with patient to schedule surgery.   3. The patient is advised in continued exam of the breast chest wall and to report to this office sooner should she note any areas of abnormality or concern.   4.  If negative margins with re-excision, ok to consider elimination of XRT or endocrine.

## 2023-04-06 NOTE — PROGRESS NOTES
[facility]       Post-Op        REFERRING PHYSICIAN:  No referring provider defined for this encounter.       ANURADHA Badillo MD    MEDICAL ONCOLOGIST:    Jeanne Sorto MD  RADIATION ONCOLOGIST:   Avelina Modi MD    DIAGNOSIS:    This is a 88 y.o. female with a stage pT1a Nx Mx grade 1 ER + DE NA HER2 NA invasive ductal carcinoma with DCIS of the left breast.     TREATMENT SUMMARY:  The patient is status post left partial mastectomy and left duct excision on 3/3/2023.  Final pathology showed multifocal invasive ductal carcinoma with DCIS. All margins negative for invasive carcinoma. Medial margin positive for DCIS and DCIS is <1mm from superior, inferior, and lateral margins. DCIS is 1 mm from inferior margin and 2 mm from anterior margin.    INTERVAL HISTORY:   Ashely Holman presents for discussion of treatment options given the positive DCIS margins on pathology.     Her case was discussed at tumor conference on 3/28:  On surgical pathology, multifocal invasive disease with associated low grade DCIS. All margins are negative for invasive disease. Medial margin is positive for DCIS. Superior and posterior are close at less than 1 mm from DCIS. Patient is a poor candidate for mastectomy. Team discussed proceeding with XRT and endocrine vs re-excision. Breast surgery will discuss options with patient. If margins were cleared following re-excision, patient could defer endocrine therapy.    MEDICATIONS:  Current Outpatient Medications   Medication Sig Dispense Refill    albuterol (PROVENTIL) 2.5 mg /3 mL (0.083 %) nebulizer solution Take 3 mLs (2.5 mg total) by nebulization every 6 (six) hours as needed for Wheezing. 100 each 5    albuterol-ipratropium (DUO-NEB) 2.5 mg-0.5 mg/3 mL nebulizer solution Take 3 mLs by nebulization once. Rescue for 1 dose 75 mL 0    cholecalciferol, vitamin D3, 2,000 unit Cap Take 1 capsule by mouth once daily.      ferrous gluconate (FERGON) 324 MG tablet Take 1 tablet (324  mg total) by mouth once daily. WITH LUNCH 30 tablet 11    fluticasone furoate-vilanteroL (BREO ELLIPTA) 200-25 mcg/dose DsDv diskus inhaler Inhale 1 puff into the lungs once daily. Controller (Patient taking differently: Inhale 1 puff into the lungs nightly. Controller) 60 each 11    HYDROcodone-acetaminophen (NORCO) 5-325 mg per tablet Take 1 tablet by mouth every 6 (six) hours as needed for Pain. 8 tablet 0    levothyroxine (SYNTHROID) 100 MCG tablet TAKE 1 TABLET(100 MCG) BY MOUTH BEFORE BREAKFAST 90 tablet 3    losartan (COZAAR) 100 MG tablet TAKE 1 TABLET(100 MG) BY MOUTH EVERY DAY 90 tablet 3    timolol maleate 0.5% (TIMOPTIC) 0.5 % Drop       travoprost, benzalkonium, (TRAVATAN) 0.004 % ophthalmic solution 1 drop every evening.       No current facility-administered medications for this visit.       ALLERGIES:   Review of patient's allergies indicates:   Allergen Reactions    Lyrica [pregabalin] Rash    Cymbalta [duloxetine] Nausea And Vomiting       PHYSICAL EXAMINATION:   General:  This is a well appearing female with appropriate speech, affect and gait.     Breast:  Incision clean, dry, and intact    IMPRESSION:   The patient has had an uneventful postoperative course.    PLAN:   1. Planning re-excision of positive margin.   2. Will be in contact with patient to schedule surgery.   3. The patient is advised in continued exam of the breast chest wall and to report to this office sooner should she note any areas of abnormality or concern.   4.  If negative margins with re-excision, ok to consider elimination of XRT or endocrine.

## 2023-04-11 ENCOUNTER — DOCUMENTATION ONLY (OUTPATIENT)
Dept: HEMATOLOGY/ONCOLOGY | Facility: CLINIC | Age: 88
End: 2023-04-11
Payer: MEDICARE

## 2023-04-11 NOTE — NURSING
Pt met with Dr Cotter to discuss possible re-excision.  Scheduled for 5/3/23, pt verbalized understanding.  No additional needs at present.   Oncology Navigation   Intake  Date of Diagnosis: 01/12/23  Cancer Type: Breast  Internal / External Referral: Internal  Date of Referral: 12/30/22  Initial Nurse Navigator Contact: 01/18/23  Referral to Initial Contact Timeline (days): 19  Date Worked: 04/07/23  First Appointment Available: 01/19/23  Appointment Date: 01/19/23  First Available Date vs. Scheduled Date (days): 0     Treatment  Current Status: Staging work-up  Date Presented to Tumor Board: 03/28/23    Surgical Oncologist: ericka  Type of Surgery: re-excision  Consult Date: 05/03/23  Surgery Schedule Date: 03/03/23    Medical Oncologist: killian  Consult Date: 03/31/23    Radiation Oncologist: pérez    Procedures: Biopsy  Biopsy Schedule Date: 01/12/23          Radiation Oncologist: pérez    Support Systems: Family members     Acuity  Treatment Tolerability: Has not started treatment yet/treatment fully completed and side effects resolved  Hospitalization Within the Past Month: 0   Needed: 1 (dtr)  Support: 0  Verbalizes Financial Concerns: 0  Transportation: 0  History of noncompliance/frequent no shows and cancellations: 0  Verbalizes the need for more education: 0  Navigation Acuity: 2     Follow Up  Follow up in about 23 days (around 5/4/2023), or re-excision with ericka 5/3.

## 2023-04-13 RX ORDER — CEFAZOLIN SODIUM 2 G/50ML
2 SOLUTION INTRAVENOUS
Status: CANCELLED | OUTPATIENT
Start: 2023-04-13

## 2023-04-13 RX ORDER — SODIUM CHLORIDE 9 MG/ML
INJECTION, SOLUTION INTRAVENOUS CONTINUOUS
Status: CANCELLED | OUTPATIENT
Start: 2023-04-13

## 2023-04-26 ENCOUNTER — ANESTHESIA EVENT (OUTPATIENT)
Dept: SURGERY | Facility: OTHER | Age: 88
End: 2023-04-26
Payer: MEDICARE

## 2023-04-27 NOTE — PRE-PROCEDURE INSTRUCTIONS
Pre admit phone call completed.    Instructions given to patient about NPO status as follows:     The evening before surgery do not eat anything after 9 p.m. ( this includes hard candy, chewing gum and mints).  You may only have WATER from 9 p.m. until you leave your home. DO NOT  DRINK ANY LIQUIDS ON THE WAY TO THE HOSPITAL.      Patient was also instructed on the below information:    Park in the Parking lot behind the hospital or in the [employer] [facility] [facility] across the street from the parking lot.  Parking is complimentary.  If you will be discharged the same day as your procedure, please arrange for a responsible adult to drive you home or  to accompany you if traveling by taxi.  YOU WILL NOT BE PERMITTED TO DRIVE OR TO LEAVE THE HOSPITAL ALONE AFTER SURGERY.  It is strongly recommended that you arrange for someone to remain with you for the first 24 hrs following your surgery.    Patient verbalized understanding of above instructions.

## 2023-05-02 ENCOUNTER — TELEPHONE (OUTPATIENT)
Dept: SURGERY | Facility: CLINIC | Age: 88
End: 2023-05-02
Payer: MEDICARE

## 2023-05-02 NOTE — TELEPHONE ENCOUNTER
Confirmed arrival time for surgery on 05/03/23 at the [facility] location with Dr.Amy Cotter. Arrival time is for 10 am surgery is scheduled for 2 pm . Nothing to eat after 9 pm this evening 5/02/23. Clear liquids Gatorade up until patient leaves home in the morning . In formed  daughter to please leave all jewelry home also please have patient to  wear a button down shirt on the morning of surgery . Anastasia Holman voiced under to this call.

## 2023-05-03 ENCOUNTER — HOSPITAL ENCOUNTER (OUTPATIENT)
Facility: OTHER | Age: 88
Discharge: HOME | End: 2023-05-03
Attending: SURGERY | Admitting: SURGERY
Payer: MEDICARE

## 2023-05-03 ENCOUNTER — ANESTHESIA (OUTPATIENT)
Dept: SURGERY | Facility: OTHER | Age: 88
End: 2023-05-03
Payer: MEDICARE

## 2023-05-03 VITALS
HEIGHT: 61 IN | DIASTOLIC BLOOD PRESSURE: 72 MMHG | WEIGHT: 121 LBS | RESPIRATION RATE: 16 BRPM | SYSTOLIC BLOOD PRESSURE: 170 MMHG | TEMPERATURE: 97 F | OXYGEN SATURATION: 95 % | BODY MASS INDEX: 22.84 KG/M2 | HEART RATE: 72 BPM

## 2023-05-03 DIAGNOSIS — Z17.0 MALIGNANT NEOPLASM OF CENTRAL PORTION OF LEFT BREAST IN FEMALE, ESTROGEN RECEPTOR POSITIVE: ICD-10-CM

## 2023-05-03 DIAGNOSIS — C50.112 MALIGNANT NEOPLASM OF CENTRAL PORTION OF LEFT BREAST IN FEMALE, ESTROGEN RECEPTOR POSITIVE: ICD-10-CM

## 2023-05-03 PROCEDURE — D9220A PRA ANESTHESIA: Mod: ANES,,, | Performed by: ANESTHESIOLOGY

## 2023-05-03 PROCEDURE — 63600175 PHARM REV CODE 636 W HCPCS: Performed by: NURSE ANESTHETIST, CERTIFIED REGISTERED

## 2023-05-03 PROCEDURE — 37000009 HC ANESTHESIA EA ADD 15 MINS: Performed by: SURGERY

## 2023-05-03 PROCEDURE — 36000707: Performed by: SURGERY

## 2023-05-03 PROCEDURE — 36000706: Performed by: SURGERY

## 2023-05-03 PROCEDURE — 25000003 PHARM REV CODE 250: Performed by: NURSE ANESTHETIST, CERTIFIED REGISTERED

## 2023-05-03 PROCEDURE — 71000016 HC POSTOP RECOV ADDL HR: Performed by: SURGERY

## 2023-05-03 PROCEDURE — 88307 TISSUE EXAM BY PATHOLOGIST: CPT | Mod: 26,,, | Performed by: PATHOLOGY

## 2023-05-03 PROCEDURE — 27201423 OPTIME MED/SURG SUP & DEVICES STERILE SUPPLY: Performed by: SURGERY

## 2023-05-03 PROCEDURE — 88307 PR  SURG PATH,LEVEL V: ICD-10-PCS | Mod: 26,,, | Performed by: PATHOLOGY

## 2023-05-03 PROCEDURE — 63600175 PHARM REV CODE 636 W HCPCS: Performed by: ANESTHESIOLOGY

## 2023-05-03 PROCEDURE — D9220A PRA ANESTHESIA: ICD-10-PCS | Mod: CRNA,,, | Performed by: NURSE ANESTHETIST, CERTIFIED REGISTERED

## 2023-05-03 PROCEDURE — 88307 TISSUE EXAM BY PATHOLOGIST: CPT | Performed by: PATHOLOGY

## 2023-05-03 PROCEDURE — 25000003 PHARM REV CODE 250: Performed by: SURGERY

## 2023-05-03 PROCEDURE — 19301 PARTIAL MASTECTOMY: CPT | Mod: 58,LT,, | Performed by: SURGERY

## 2023-05-03 PROCEDURE — 37000008 HC ANESTHESIA 1ST 15 MINUTES: Performed by: SURGERY

## 2023-05-03 PROCEDURE — 71000033 HC RECOVERY, INTIAL HOUR: Performed by: SURGERY

## 2023-05-03 PROCEDURE — D9220A PRA ANESTHESIA: Mod: CRNA,,, | Performed by: NURSE ANESTHETIST, CERTIFIED REGISTERED

## 2023-05-03 PROCEDURE — 25000003 PHARM REV CODE 250: Performed by: ANESTHESIOLOGY

## 2023-05-03 PROCEDURE — 19301 PR MASTECTOMY, PARTIAL: ICD-10-PCS | Mod: 58,LT,, | Performed by: SURGERY

## 2023-05-03 PROCEDURE — D9220A PRA ANESTHESIA: ICD-10-PCS | Mod: ANES,,, | Performed by: ANESTHESIOLOGY

## 2023-05-03 PROCEDURE — 71000015 HC POSTOP RECOV 1ST HR: Performed by: SURGERY

## 2023-05-03 RX ORDER — DIPHENHYDRAMINE HYDROCHLORIDE 50 MG/ML
25 INJECTION INTRAMUSCULAR; INTRAVENOUS EVERY 6 HOURS PRN
Status: DISCONTINUED | OUTPATIENT
Start: 2023-05-03 | End: 2023-05-03 | Stop reason: HOSPADM

## 2023-05-03 RX ORDER — LIDOCAINE HYDROCHLORIDE 10 MG/ML
0.5 INJECTION, SOLUTION EPIDURAL; INFILTRATION; INTRACAUDAL; PERINEURAL ONCE
Status: DISCONTINUED | OUTPATIENT
Start: 2023-05-03 | End: 2023-12-14

## 2023-05-03 RX ORDER — CEFAZOLIN SODIUM 1 G/3ML
INJECTION, POWDER, FOR SOLUTION INTRAMUSCULAR; INTRAVENOUS
Status: DISCONTINUED | OUTPATIENT
Start: 2023-05-03 | End: 2023-05-03

## 2023-05-03 RX ORDER — SODIUM CHLORIDE 0.9 % (FLUSH) 0.9 %
3 SYRINGE (ML) INJECTION
Status: DISCONTINUED | OUTPATIENT
Start: 2023-05-03 | End: 2023-05-03 | Stop reason: HOSPADM

## 2023-05-03 RX ORDER — LIDOCAINE HYDROCHLORIDE 20 MG/ML
INJECTION INTRAVENOUS
Status: DISCONTINUED | OUTPATIENT
Start: 2023-05-03 | End: 2023-05-03

## 2023-05-03 RX ORDER — ONDANSETRON 2 MG/ML
INJECTION INTRAMUSCULAR; INTRAVENOUS
Status: DISCONTINUED | OUTPATIENT
Start: 2023-05-03 | End: 2023-05-03

## 2023-05-03 RX ORDER — DEXAMETHASONE SODIUM PHOSPHATE 4 MG/ML
INJECTION, SOLUTION INTRA-ARTICULAR; INTRALESIONAL; INTRAMUSCULAR; INTRAVENOUS; SOFT TISSUE
Status: DISCONTINUED | OUTPATIENT
Start: 2023-05-03 | End: 2023-05-03

## 2023-05-03 RX ORDER — PROCHLORPERAZINE EDISYLATE 5 MG/ML
5 INJECTION INTRAMUSCULAR; INTRAVENOUS EVERY 30 MIN PRN
Status: DISCONTINUED | OUTPATIENT
Start: 2023-05-03 | End: 2023-05-03 | Stop reason: HOSPADM

## 2023-05-03 RX ORDER — FENTANYL CITRATE 50 UG/ML
INJECTION, SOLUTION INTRAMUSCULAR; INTRAVENOUS
Status: DISCONTINUED | OUTPATIENT
Start: 2023-05-03 | End: 2023-05-03

## 2023-05-03 RX ORDER — MEPERIDINE HYDROCHLORIDE 25 MG/ML
12.5 INJECTION INTRAMUSCULAR; INTRAVENOUS; SUBCUTANEOUS ONCE AS NEEDED
Status: DISCONTINUED | OUTPATIENT
Start: 2023-05-03 | End: 2023-05-03 | Stop reason: HOSPADM

## 2023-05-03 RX ORDER — HYDROMORPHONE HYDROCHLORIDE 2 MG/ML
0.4 INJECTION, SOLUTION INTRAMUSCULAR; INTRAVENOUS; SUBCUTANEOUS EVERY 5 MIN PRN
Status: DISCONTINUED | OUTPATIENT
Start: 2023-05-03 | End: 2023-05-03 | Stop reason: HOSPADM

## 2023-05-03 RX ORDER — OXYCODONE HYDROCHLORIDE 5 MG/1
5 TABLET ORAL
Status: DISCONTINUED | OUTPATIENT
Start: 2023-05-03 | End: 2023-05-03 | Stop reason: HOSPADM

## 2023-05-03 RX ORDER — BUPIVACAINE HYDROCHLORIDE 2.5 MG/ML
INJECTION, SOLUTION EPIDURAL; INFILTRATION; INTRACAUDAL
Status: DISCONTINUED | OUTPATIENT
Start: 2023-05-03 | End: 2023-05-03 | Stop reason: HOSPADM

## 2023-05-03 RX ORDER — SODIUM CHLORIDE, SODIUM LACTATE, POTASSIUM CHLORIDE, CALCIUM CHLORIDE 600; 310; 30; 20 MG/100ML; MG/100ML; MG/100ML; MG/100ML
INJECTION, SOLUTION INTRAVENOUS CONTINUOUS
Status: DISCONTINUED | OUTPATIENT
Start: 2023-05-03 | End: 2023-12-14

## 2023-05-03 RX ORDER — SODIUM CHLORIDE 9 MG/ML
INJECTION, SOLUTION INTRAVENOUS CONTINUOUS
Status: DISCONTINUED | OUTPATIENT
Start: 2023-05-03 | End: 2023-05-03 | Stop reason: HOSPADM

## 2023-05-03 RX ORDER — PHENYLEPHRINE HCL IN 0.9% NACL 1 MG/10 ML
SYRINGE (ML) INTRAVENOUS
Status: DISCONTINUED | OUTPATIENT
Start: 2023-05-03 | End: 2023-05-03

## 2023-05-03 RX ORDER — HYDROCODONE BITARTRATE AND ACETAMINOPHEN 5; 325 MG/1; MG/1
1 TABLET ORAL EVERY 6 HOURS PRN
Qty: 10 TABLET | Refills: 0 | Status: SHIPPED | OUTPATIENT
Start: 2023-05-03 | End: 2023-06-20

## 2023-05-03 RX ORDER — PROPOFOL 10 MG/ML
VIAL (ML) INTRAVENOUS
Status: DISCONTINUED | OUTPATIENT
Start: 2023-05-03 | End: 2023-05-03

## 2023-05-03 RX ADMIN — FENTANYL CITRATE 25 MCG: 0.05 INJECTION, SOLUTION INTRAMUSCULAR; INTRAVENOUS at 01:05

## 2023-05-03 RX ADMIN — CEFAZOLIN 2 G: 1 INJECTION, POWDER, FOR SOLUTION INTRAMUSCULAR; INTRAVENOUS at 12:05

## 2023-05-03 RX ADMIN — PROPOFOL 130 MG: 10 INJECTION, EMULSION INTRAVENOUS at 12:05

## 2023-05-03 RX ADMIN — HYDROMORPHONE HYDROCHLORIDE 0.4 MG: 2 INJECTION INTRAMUSCULAR; INTRAVENOUS; SUBCUTANEOUS at 02:05

## 2023-05-03 RX ADMIN — Medication 50 MCG: at 01:05

## 2023-05-03 RX ADMIN — Medication 100 MCG: at 12:05

## 2023-05-03 RX ADMIN — LIDOCAINE HYDROCHLORIDE 100 MG: 20 INJECTION, SOLUTION INTRAVENOUS at 12:05

## 2023-05-03 RX ADMIN — SODIUM CHLORIDE, SODIUM LACTATE, POTASSIUM CHLORIDE, AND CALCIUM CHLORIDE: .6; .31; .03; .02 INJECTION, SOLUTION INTRAVENOUS at 12:05

## 2023-05-03 RX ADMIN — DEXAMETHASONE SODIUM PHOSPHATE 4 MG: 4 INJECTION, SOLUTION INTRA-ARTICULAR; INTRALESIONAL; INTRAMUSCULAR; INTRAVENOUS; SOFT TISSUE at 01:05

## 2023-05-03 RX ADMIN — OXYCODONE HYDROCHLORIDE 5 MG: 5 TABLET ORAL at 02:05

## 2023-05-03 RX ADMIN — ONDANSETRON 4 MG: 2 INJECTION INTRAMUSCULAR; INTRAVENOUS at 01:05

## 2023-05-03 RX ADMIN — FENTANYL CITRATE 50 MCG: 0.05 INJECTION, SOLUTION INTRAMUSCULAR; INTRAVENOUS at 12:05

## 2023-05-03 NOTE — BRIEF OP NOTE
[facility] ([place])  Brief Operative Note    Surgery Date: 5/3/2023     Surgeon(s) and Role:     * Rosmery Kingsley MD - Primary    Assisting Surgeon: None    Pre-op Diagnosis:  Malignant neoplasm of central portion of left breast in female, estrogen receptor positive [C50.112, Z17.0]    Post-op Diagnosis:  Post-Op Diagnosis Codes:     * Malignant neoplasm of central portion of left breast in female, estrogen receptor positive [C50.112, Z17.0]    Procedure(s) (LRB):  MASTECTOMY, PARTIAL- Left Re-excision (Left)    Anesthesia: General    Operative Findings: Left lumpectomy re-excision with new margins taken in every direction.    Estimated Blood Loss: minimal         Specimens:   Specimen (24h ago, onward)       Start     Ordered    05/03/23 1330  Specimen to Pathology, Surgery General Surgery  Once        Comments: Pre-op Diagnosis: Malignant neoplasm of central portion of left breast in female, estrogen receptor positive [C50.112, Z17.0]Procedure(s):MASTECTOMY, PARTIAL- Left Re-excision Number of specimens: 1Name of specimens: 1. Left breast re-excision lumpectomy blue- superiorGreen -inferior, orange-lateral, red- medial, yellow-anterior, black-posterior     References:    Click here for ordering Quick Tip   Question Answer Comment   Procedure Type: General Surgery    Specimen Class: Known or suspected malignancy    Which provider would you like to cc? ROSMERY KINGSLEY    Release to patient Immediate        05/03/23 1344                      Discharge Note    OUTCOME: Patient tolerated treatment/procedure well without complication and is now ready for discharge.    DISPOSITION: Home or Self Care    FINAL DIAGNOSIS:  Malignant neoplasm of central portion of left breast in female, estrogen receptor positive [C50.112, Z17.0]    FOLLOWUP: In clinic    DISCHARGE INSTRUCTIONS:  See Patient Instructions.

## 2023-05-03 NOTE — PATIENT INSTRUCTIONS
POSTOPERATIVE INSTRUCTIONS FOLLOWING BIOPSY OR LUMPECTOMY    The following are post-operative instructions that will help you to recover from your surgery.  Please read over these instructions carefully and contact us if we can answer any of your questions or concerns.    Dressing/breast binder (surgi-bra)  A surgical bra may be placed around your chest after your surgery.  If you are given the bra, please wear it as close to 24 hours a day as possible until your post-operative clinic appointment.  If the elastic around the bra irritates your skin, you may wear a soft t-shirt underneath the bra.  You may go without wearing the bra long enough to shower, to launder and dry the bra.  If the bra is extremely uncomfortable, you may wear a supportive sports bra instead after 2 days.  You may shower the day after surgery.  Do not take a tub bath and do not soak the surgical site.    Activity   You should be able to return to your regular activities 2 days after your surgery.  However, do not engage in strenuous activities in which you use your upper body such as:  golf, tennis, aerobics, washing windows, raking the yard, mopping, vacuuming, heavy lifting (e.g children) until you are seen for your follow-up appointment in clinic.    Medication for pain  You may find that over the counter pain medications may be sufficient for your pain.  You will be given a prescription for pain medication for more severe pain.  You should not drive or operate machinery while taking these.  Please take narcotics with food.  Narcotics can cause, or worse, constipation.  You will need to increase your fluid intake, eat high fiber foods (such as fruits and bran) and make sure that you are up and walking. You may need to take an over the counter stool softener for constipation.    Please report the following:  Temperature greater than 101 degrees  Discharge or bad odor from the wound  Excessive bleeding, such as bloody dressing or extreme  bruising  Redness at incision and/or drain sites  Swelling or buildup of fluid around incision    Additional information  I will see you approximately 2 weeks following your surgery.  If this follow-up appointment has not been made, please call the office.    If you have any questions or problems, please call my office or my nurse.    MD Jeannie Ambriz, RN  [phone]    After hours and on weekends, you may call the main [facility] line at [phone] and ask to have the general surgery resident paged or have me paged.

## 2023-05-03 NOTE — ANESTHESIA PREPROCEDURE EVALUATION
05/03/2023  Ashely Holman is a 89 y.o., female.      Pre-op Assessment    I have reviewed the Patient Summary Reports.     I have reviewed the Nursing Notes. I have reviewed the NPO Status.   I have reviewed the Medications.     Review of Systems  Anesthesia Hx:  Denies Family Hx of Anesthesia complications.   Denies Personal Hx of Anesthesia complications.   Social:  Non-Smoker    Hematology/Oncology:         -- Anemia: Current/Recent Cancer. Breast   Cardiovascular:   Hypertension CHF (diastolic)    Pulmonary:   COPD, moderate    Renal/:   Chronic Renal Disease, CKD    Hepatic/GI:   PUD,    Musculoskeletal:   Arthritis     Neurological:   CVA    Endocrine:   Diabetes Hypothyroidism        Physical Exam  General: Well nourished, Cooperative, Alert and Oriented    Airway:  Mallampati: II   Mouth Opening: Normal  TM Distance: Normal  Tongue: Normal  Neck ROM: Normal ROM    Dental:  Intact        Anesthesia Plan  Type of Anesthesia, risks & benefits discussed:    Anesthesia Type: Gen ETT  Intra-op Monitoring Plan: Standard ASA Monitors  Post Op Pain Control Plan: multimodal analgesia  Induction:  IV  Airway Plan: Video, Post-Induction  Informed Consent: Informed consent signed with the Patient and all parties understand the risks and agree with anesthesia plan.  All questions answered.   ASA Score: 3  Anesthesia Plan Notes: CBC, BMP, EKG    Day of surgery update: hb 11.5, creat 1.5  EKG SR 82, PACs    Update 5/3/23    Pt returns for reexcision    Ready For Surgery From Anesthesia Perspective.     .

## 2023-05-03 NOTE — TRANSFER OF CARE
"Anesthesia Transfer of Care Note    Patient: Ashely Holman    Procedure(s) Performed: Procedure(s) (LRB):  MASTECTOMY, PARTIAL- Left Re-excision (Left)    Patient location: PACU    Anesthesia Type: general    Transport from OR: Transported from OR on 2-3 L/min O2 by NC with adequate spontaneous ventilation    Post pain: adequate analgesia    Post assessment: no apparent anesthetic complications and tolerated procedure well    Post vital signs: stable    Level of consciousness: awake and alert    Nausea/Vomiting: no nausea/vomiting    Complications: none    Transfer of care protocol was followed      Last vitals:   Visit Vitals  BP (!) 120/59 (BP Location: Left arm, Patient Position: Lying)   Pulse 81   Temp 36.4 °C (97.5 °F) (Oral)   Resp 20   Ht 5' 1" (1.549 m)   Wt 54.9 kg (121 lb)   SpO2 95%   Breastfeeding No   BMI 22.86 kg/m²     "
General

## 2023-05-03 NOTE — ANESTHESIA POSTPROCEDURE EVALUATION
Anesthesia Post Evaluation    Patient: Ashely Holman    Procedure(s) Performed: Procedure(s) (LRB):  MASTECTOMY, PARTIAL- Left Re-excision (Left)    Final Anesthesia Type: general      Patient location during evaluation: PACU  Patient participation: Yes- Able to Participate  Level of consciousness: awake and alert  Post-procedure vital signs: reviewed and stable  Pain management: adequate  Airway patency: patent    PONV status at discharge: No PONV  Anesthetic complications: no      Cardiovascular status: blood pressure returned to baseline and stable  Respiratory status: room air  Hydration status: euvolemic  Follow-up not needed.          Vitals Value Taken Time   /84 05/03/23 1423   Temp 36.3 °C (97.3 °F) 05/03/23 1425   Pulse 70 05/03/23 1431   Resp 16 05/03/23 1420   SpO2 95 % 05/03/23 1431   Vitals shown include unvalidated device data.      Event Time   Out of Recovery 14:32:00         Pain/Viktor Score: Pain Rating Prior to Med Admin: 7 (5/3/2023  2:14 PM)  Pain Rating Post Med Admin: 3 (5/3/2023  2:32 PM)  Viktor Score: 8 (5/3/2023  2:21 PM)

## 2023-05-04 NOTE — OP NOTE
DATE OF PROCEDURE: 5/3/2023    SURGEON: Surgeon(s) and Role:     * Rosmery Cotter MD - Primary  Resident: Margo    PREOPERATIVE DIAGNOSIS:  Malignant Neoplasm of the left breast upper inner quadrant    POSTOPERATIVE DIAGNOSIS: same    ANESTHESIA: local and general    PROCEDURES PERFORMED:   left breast reexcision partial mastectomy (lumpectomy) for clear margins     PROCEDURE IN DETAIL:   The patient underwent informed consent.      She was then brought to the Operating Room and placed in the supine position. Anesthesia with local/general anesthesia care with sedation was administered.  The left breast, anterior chest, right arm and axilla were then prepped and draped in a sterile fashion.     Next, we turned our attention to the left breast. Local anesthetic was injected into the area.  The prior incision was excised in the upper outer quadrant of the left breast . The prior cavity was entered and the seroma was evacuated. We then dissected out the margins requiring reexcision which included a global re-excision of the entire cavity as one lumpectomy specimen.  We did dissect all the way down to, and including the underlying pectoralis fascia. The specimen was then inked on the back table using the paint kit and fixative.  It was then fixed with acetic acid and submitted for permanent pathology.     Within the lumpectomy cavity, hemostasis was achieved with cautery. The wound was irrigated until clear. There was no evidence of bleeding. It was closed in multiple layers with deep dermal and subcutaneous interrupted Vicryl sutures and a running 4-0 vicryl subcuticular skin closure.    Dermabond was applied. Sterile fluff gauze was placed and a post-procedure bra was placed. She tolerated the procedure well without complication and was turned over to Anesthesia for transport to the recovery area in a satisfactory condition. All specimens were sent to Pathology for permanent sectioning.    ESTIMATED BLOOD LOSS:  5ml    COMPLICATIONS: none    DISPOSITION:  PACU--hemodynamically stable    ATTESTATION:  I was present and scrubbed for the entire procedure.

## 2023-05-10 LAB
FINAL PATHOLOGIC DIAGNOSIS: NORMAL
GROSS: NORMAL
Lab: NORMAL

## 2023-05-16 ENCOUNTER — OFFICE VISIT (OUTPATIENT)
Dept: SURGERY | Facility: CLINIC | Age: 88
End: 2023-05-16
Payer: MEDICARE

## 2023-05-16 ENCOUNTER — DOCUMENTATION ONLY (OUTPATIENT)
Dept: HEMATOLOGY/ONCOLOGY | Facility: CLINIC | Age: 88
End: 2023-05-16
Payer: MEDICARE

## 2023-05-16 VITALS
HEIGHT: 61 IN | SYSTOLIC BLOOD PRESSURE: 187 MMHG | WEIGHT: 125 LBS | HEART RATE: 76 BPM | BODY MASS INDEX: 23.6 KG/M2 | OXYGEN SATURATION: 93 % | DIASTOLIC BLOOD PRESSURE: 80 MMHG

## 2023-05-16 DIAGNOSIS — C50.112 MALIGNANT NEOPLASM OF CENTRAL PORTION OF LEFT BREAST IN FEMALE, ESTROGEN RECEPTOR POSITIVE: Primary | ICD-10-CM

## 2023-05-16 DIAGNOSIS — Z17.0 MALIGNANT NEOPLASM OF CENTRAL PORTION OF LEFT BREAST IN FEMALE, ESTROGEN RECEPTOR POSITIVE: Primary | ICD-10-CM

## 2023-05-16 DIAGNOSIS — Z09 POSTOP CHECK: ICD-10-CM

## 2023-05-16 PROCEDURE — 99999 PR PBB SHADOW E&M-EST. PATIENT-LVL III: ICD-10-PCS | Mod: PBBFAC,,, | Performed by: SURGERY

## 2023-05-16 PROCEDURE — 99024 POSTOP FOLLOW-UP VISIT: CPT | Mod: ,,, | Performed by: SURGERY

## 2023-05-16 PROCEDURE — 1101F PT FALLS ASSESS-DOCD LE1/YR: CPT | Mod: CPTII,,, | Performed by: SURGERY

## 2023-05-16 PROCEDURE — 1126F AMNT PAIN NOTED NONE PRSNT: CPT | Mod: CPTII,,, | Performed by: SURGERY

## 2023-05-16 PROCEDURE — 3288F PR FALLS RISK ASSESSMENT DOCUMENTED: ICD-10-PCS | Mod: CPTII,,, | Performed by: SURGERY

## 2023-05-16 PROCEDURE — 99999 PR PBB SHADOW E&M-EST. PATIENT-LVL III: CPT | Mod: PBBFAC,,, | Performed by: SURGERY

## 2023-05-16 PROCEDURE — 99024 PR POST-OP FOLLOW-UP VISIT: ICD-10-PCS | Mod: ,,, | Performed by: SURGERY

## 2023-05-16 PROCEDURE — 1101F PR PT FALLS ASSESS DOC 0-1 FALLS W/OUT INJ PAST YR: ICD-10-PCS | Mod: CPTII,,, | Performed by: SURGERY

## 2023-05-16 PROCEDURE — 1159F PR MEDICATION LIST DOCUMENTED IN MEDICAL RECORD: ICD-10-PCS | Mod: CPTII,,, | Performed by: SURGERY

## 2023-05-16 PROCEDURE — 1126F PR PAIN SEVERITY QUANTIFIED, NO PAIN PRESENT: ICD-10-PCS | Mod: CPTII,,, | Performed by: SURGERY

## 2023-05-16 PROCEDURE — 3288F FALL RISK ASSESSMENT DOCD: CPT | Mod: CPTII,,, | Performed by: SURGERY

## 2023-05-16 PROCEDURE — 1159F MED LIST DOCD IN RCRD: CPT | Mod: CPTII,,, | Performed by: SURGERY

## 2023-05-16 NOTE — PROGRESS NOTES
[facility]       Post-Op        REFERRING PHYSICIAN:         ANURADHA Badillo MD    MEDICAL ONCOLOGIST:    Jeanne Sorto MD  RADIATION ONCOLOGIST:   Avelina Modi MD    DIAGNOSIS:    This is a 89 y.o. female with a stage pT1a Nx grade 1 ER + MI + HER2 - invasive ductal carcinoma of the left breast.    TREATMENT SUMMARY:  The patient is status post left partial mastectomy and left duct excision on 3/3/2023.  Final pathology showed multifocal invasive ductal carcinoma with DCIS. All margins negative for invasive carcinoma. Medial margin positive for DCIS and DCIS is <1mm from superior, inferior, and lateral margins. DCIS is 1 mm from inferior margin and 2 mm from anterior margin. The patient proceeded with re-excision on 5/3/2023.  Final pathology showed residual DCIS with close posterior margin (< 1mm), all other margins are clear. No evidence of invasive carcinoma.     Left breast, re-excision lumpectomy (entirely submitted for microscopic examination):   Residual ductal carcinoma in situ (DCIS), intermediate nuclear grade.   Extent of residual DCIS:  Residual DCIS is present in 2 of 17 submitted tissue blocks.   No evidence of residual invasive tumor is present within the entirely submitted specimen.   Margins:    - DCIS Margins:  DCIS is located within less than 1 mm (within less than 0.1 cm) of the posterior/black inked surgical margin and the lateral/orange inked surgical margin; 2 mm (0.2 cm) from the superior/blue inked surgical margin.  All other margins   are greater than 10 mm (greater than 1 cm) from DCIS.   Atypical ductal hyperplasia is also present.   Previous surgical site changes including suture granulomata, fibrosis and hemosiderin-laden macrophages are present.   Skin is present and unremarkable.   Incidental benign intraductal papilloma is identified.   Microcalcifications are present associated with DCIS and benign breast tissue.   Background breast tissue shows fibrocystic  changes including florid usual ductal hyperplasia, columnar cell change, fibroadenomatoid nodules, adenosis, microcysts and dense stromal fibrosis.     ER: Positive (%, strong)   RI: Positive (%, strong)   HER2: Negative (Score 0)   Ki-67: 5%      INTERVAL HISTORY:   Ashely Holman comes in for a post-op check.  She denies fever, chills, chest pain or shortness of breath.  Her pain is well controlled.      MEDICATIONS:  Current Outpatient Medications   Medication Sig Dispense Refill    cholecalciferol, vitamin D3, 2,000 unit Cap Take 1 capsule by mouth once daily.      ferrous gluconate (FERGON) 324 MG tablet Take 1 tablet (324 mg total) by mouth once daily. WITH LUNCH 30 tablet 11    fluticasone furoate-vilanteroL (BREO ELLIPTA) 200-25 mcg/dose DsDv diskus inhaler Inhale 1 puff into the lungs once daily. Controller (Patient taking differently: Inhale 1 puff into the lungs nightly. Controller) 60 each 11    levothyroxine (SYNTHROID) 100 MCG tablet TAKE 1 TABLET(100 MCG) BY MOUTH BEFORE BREAKFAST 90 tablet 3    losartan (COZAAR) 100 MG tablet TAKE 1 TABLET(100 MG) BY MOUTH EVERY DAY 90 tablet 3    timolol maleate 0.5% (TIMOPTIC) 0.5 % Drop       travoprost, benzalkonium, (TRAVATAN) 0.004 % ophthalmic solution 1 drop every evening.      albuterol (PROVENTIL) 2.5 mg /3 mL (0.083 %) nebulizer solution Take 3 mLs (2.5 mg total) by nebulization every 6 (six) hours as needed for Wheezing. 100 each 5    albuterol-ipratropium (DUO-NEB) 2.5 mg-0.5 mg/3 mL nebulizer solution Take 3 mLs by nebulization once. Rescue for 1 dose 75 mL 0    HYDROcodone-acetaminophen (NORCO) 5-325 mg per tablet Take 1 tablet by mouth every 6 (six) hours as needed for Pain. (Patient not taking: Reported on 5/16/2023) 10 tablet 0     No current facility-administered medications for this visit.     Facility-Administered Medications Ordered in Other Visits   Medication Dose Route Frequency Provider Last Rate Last Admin    lactated ringers  infusion   Intravenous Continuous Fei Gomez MD        LIDOcaine (PF) 10 mg/ml (1%) injection 5 mg  0.5 mL Intradermal Once Fei Gomez MD           ALLERGIES:   Review of patient's allergies indicates:   Allergen Reactions    Lyrica [pregabalin] Rash    Cymbalta [duloxetine] Nausea And Vomiting       PHYSICAL EXAMINATION:   General:  This is a well appearing female with appropriate speech, affect and gait.     Breast:  Incision clean, dry, and intact    IMPRESSION:   The patient has had an uneventful postoperative course.    PLAN:   1. return in December for a follow up office visit and breast exam  2. bilateral mammogram in December 2023  3. The patient is advised in continued exam of the breast chest wall and to report to this office sooner should she note any areas of abnormality or concern.   4.  She has been instructed to follow up with Dr. Bradley and Dr. Modi for discussion of adjuvant treatment recommendations

## 2023-05-16 NOTE — PROGRESS NOTES
Pt met with Dr Cotter for post op appointment.   No additional follow up appt needed, pt verbalized understanding.  No additional needs at present.   Oncology Navigation   Intake  Date of Diagnosis: 01/12/23  Cancer Type: Breast  Internal / External Referral: Internal  Date of Referral: 12/30/22  Initial Nurse Navigator Contact: 01/18/23  Referral to Initial Contact Timeline (days): 19  Date Worked: 04/07/23  First Appointment Available: 01/19/23  Appointment Date: 01/19/23  First Available Date vs. Scheduled Date (days): 0     Treatment  Current Status: Staging work-up  Date Presented to Tumor Board: 03/28/23    Surgical Oncologist: ericka  Type of Surgery: re-excision  Consult Date: 05/03/23  Surgery Schedule Date: 03/03/23    Medical Oncologist: killian  Consult Date: 03/31/23    Radiation Oncologist: pérez    Procedures: Biopsy  Biopsy Schedule Date: 01/12/23          Radiation Oncologist: pérez    Support Systems: Family members     Acuity  Treatment Tolerability: Has not started treatment yet/treatment fully completed and side effects resolved  Hospitalization Within the Past Month: 0   Needed: 1 (dtr)  Support: 0  Verbalizes Financial Concerns: 0  Transportation: 0  History of noncompliance/frequent no shows and cancellations: 0  Verbalizes the need for more education: 0  Navigation Acuity: 2     Follow Up  No follow-ups on file.

## 2023-06-08 ENCOUNTER — TELEPHONE (OUTPATIENT)
Dept: RADIATION ONCOLOGY | Facility: CLINIC | Age: 88
End: 2023-06-08
Payer: MEDICARE

## 2023-06-08 NOTE — TELEPHONE ENCOUNTER
Anastasia returned my call. Informed that per Dr Modi, radaition will not be planned based on negative surgical margins from re-excision lumpectomy 5/3/23. Anastasia verbalized understanding that f/u with Dr Modi will not be necessary.

## 2023-06-08 NOTE — TELEPHONE ENCOUNTER
Follow up call to pt's daughter regarding radiation. Previous call was made earlier today to schedule f/u with Dr Modi and CT simulation for radiation planning. Per daughter, Anastasia, was told by Dr Cotter's assistant that radiation was not needed based on pathology of re-excision. Dr Modi notified of this information and after her further reviewing chart concluded that since re-excision margins were negative, no radiation needed. Call went to . Message left for Anastasia to call back to inform her that f/u appt w/CT simulation will not need to be scheduled. Will await call back from Anastasia.

## 2023-06-14 ENCOUNTER — TELEPHONE (OUTPATIENT)
Dept: RADIATION ONCOLOGY | Facility: CLINIC | Age: 88
End: 2023-06-14
Payer: MEDICARE

## 2023-06-14 NOTE — TELEPHONE ENCOUNTER
Call again to pt's daughter, Anastasia after receiving message from Dr Cotter that pt should f/u with Rad-Onc to finalize the decision of whether or not to proceed with radiation. Offered appt tomorrow with Dr Modi at 1:45 PM, but Anastasia stated that she would be unable to make this appt, and that she thought it was clear that pt did not need radaition based on info given at her post op appt.  Advised Anastasia to reach out to Dr Cotter's office regarding this. Anastasia verbalized understanding and will call me back after reaching out to Dr Cotter's office.

## 2023-06-20 ENCOUNTER — OFFICE VISIT (OUTPATIENT)
Dept: INTERNAL MEDICINE | Facility: CLINIC | Age: 88
End: 2023-06-20
Attending: INTERNAL MEDICINE
Payer: MEDICARE

## 2023-06-20 ENCOUNTER — TELEPHONE (OUTPATIENT)
Dept: SURGERY | Facility: CLINIC | Age: 88
End: 2023-06-20
Payer: MEDICARE

## 2023-06-20 VITALS
OXYGEN SATURATION: 98 % | BODY MASS INDEX: 23.03 KG/M2 | DIASTOLIC BLOOD PRESSURE: 74 MMHG | SYSTOLIC BLOOD PRESSURE: 126 MMHG | WEIGHT: 122 LBS | HEART RATE: 105 BPM | HEIGHT: 61 IN

## 2023-06-20 DIAGNOSIS — D05.12 BREAST NEOPLASM, TIS (DCIS), LEFT: ICD-10-CM

## 2023-06-20 DIAGNOSIS — D69.6 THROMBOCYTOPENIA: ICD-10-CM

## 2023-06-20 DIAGNOSIS — M81.0 OSTEOPOROSIS, POST-MENOPAUSAL: ICD-10-CM

## 2023-06-20 DIAGNOSIS — I10 ESSENTIAL HYPERTENSION: ICD-10-CM

## 2023-06-20 DIAGNOSIS — N18.31 STAGE 3A CHRONIC KIDNEY DISEASE: ICD-10-CM

## 2023-06-20 DIAGNOSIS — Z86.39 HISTORY OF DIET-CONTROLLED DIABETES: ICD-10-CM

## 2023-06-20 DIAGNOSIS — E03.9 ACQUIRED HYPOTHYROIDISM: Primary | ICD-10-CM

## 2023-06-20 DIAGNOSIS — J45.20 MILD INTERMITTENT ASTHMA WITHOUT COMPLICATION: ICD-10-CM

## 2023-06-20 PROCEDURE — 1159F MED LIST DOCD IN RCRD: CPT | Mod: CPTII,S$GLB,, | Performed by: INTERNAL MEDICINE

## 2023-06-20 PROCEDURE — 1160F RVW MEDS BY RX/DR IN RCRD: CPT | Mod: CPTII,S$GLB,, | Performed by: INTERNAL MEDICINE

## 2023-06-20 PROCEDURE — 1160F PR REVIEW ALL MEDS BY PRESCRIBER/CLIN PHARMACIST DOCUMENTED: ICD-10-PCS | Mod: CPTII,S$GLB,, | Performed by: INTERNAL MEDICINE

## 2023-06-20 PROCEDURE — 1159F PR MEDICATION LIST DOCUMENTED IN MEDICAL RECORD: ICD-10-PCS | Mod: CPTII,S$GLB,, | Performed by: INTERNAL MEDICINE

## 2023-06-20 PROCEDURE — 99214 OFFICE O/P EST MOD 30 MIN: CPT | Mod: S$GLB,,, | Performed by: INTERNAL MEDICINE

## 2023-06-20 PROCEDURE — 99214 PR OFFICE/OUTPT VISIT, EST, LEVL IV, 30-39 MIN: ICD-10-PCS | Mod: S$GLB,,, | Performed by: INTERNAL MEDICINE

## 2023-06-20 NOTE — PROGRESS NOTES
Subjective     Patient ID: Ashely Holman is a 89 y.o. female.    Chief Complaint: Follow-up (Feels as if Dementia has progressed does not leave her home alone at any point. Would like to discuss meds, crying more often) and Cough    She had a partial left mastectomy in March of 2023.  She had a re-excision in May of 2023 to provide clearer margins.  She is doing well.    Follow-up  Associated symptoms include coughing. Pertinent negatives include no chest pain.   Cough  Pertinent negatives include no chest pain or shortness of breath.   Hypertension  This is a chronic problem. The current episode started more than 1 year ago. The problem is controlled. Pertinent negatives include no chest pain or shortness of breath.   Review of Systems   Constitutional: Negative.    Respiratory:  Positive for cough. Negative for shortness of breath.    Cardiovascular:  Negative for chest pain.        Objective     Physical Exam  Vitals and nursing note reviewed.   Constitutional:       Appearance: She is well-developed.   HENT:      Head: Normocephalic.   Eyes:      Pupils: Pupils are equal, round, and reactive to light.   Cardiovascular:      Rate and Rhythm: Normal rate and regular rhythm.      Heart sounds: Normal heart sounds.   Pulmonary:      Effort: Pulmonary effort is normal.      Breath sounds: Examination of the right-middle field reveals rales. Examination of the right-lower field reveals rales. Examination of the left-lower field reveals rales. Rales present.   Neurological:      Mental Status: She is alert.          Assessment and Plan     1. Acquired hypothyroidism    2. Mild intermittent asthma without complication    3. Essential hypertension    4. History of diet-controlled diabetes  Overview:  2005 - Resolved prior to 2014      5. Stage 3a chronic kidney disease    6. Thrombocytopenia  Overview:  9/16      7. Osteoporosis, post-menopausal  Overview:  Heel scan 8/14      8. Breast neoplasm, Tis (DCIS),  left  Overview:  S/p partial mastectomy left breast 3/23 and 5/23          Per orders and D/C instructions.  Continue diet and/or meds for hypothyroidism, asthma, HTN, and CKD, which are stable.  Follow-up with breast surgery for recent left breast cancer.  She defers getting labs until next visit.    Between 30 and 39 min of total time for evaluation and management services were spent on the patient today.  The medical problems and treatment options were discussed, and all questions were answered.             Follow up in about 6 months (around 12/20/2023).

## 2023-06-21 ENCOUNTER — TELEPHONE (OUTPATIENT)
Dept: RADIATION ONCOLOGY | Facility: CLINIC | Age: 88
End: 2023-06-21
Payer: MEDICARE

## 2023-06-21 NOTE — TELEPHONE ENCOUNTER
Call to pt's daughter to schedule a f/u visit with Dr Modi to discuss radiation post breast re-exc. Appt scheduled 7/7/23 at [facility] at 1:00 PM.

## 2023-07-05 NOTE — PROGRESS NOTES
REFERRING PHYSICIAN:   Rosmery Cotter MD, Jeanne Sorto MD     DIAGNOSIS:  pmT1a Nx M0, stage IA, invasive ductal carcinoma the left breast     INTERVAL HISTORY:   Ms. Holman is an 89-year-old female with dimentia who is well known to me after consultation on March 31, 2023 regarding treatment of left breast cancer.  Please see full consultation note on that date for details.      Briefly, she was diagnosed with left breast cancer after evaluation for a palpable mass in the central aspect of the left breast.  Mammogram and ultrasound on January 29, 2023 revealed an oval mass corresponding to the palpable lesion.  This measured 2 x 1.1 x 2.5 cm.  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      She underwent lumpectomy on March 3, 2023.  Pathology revealed the left breast with multifocal, grade 1, invasive ductal carcinoma measuring 0.3 cm, 0.15 cm, and 1 mm with associated grade 1, DCIS, measuring 40 mm.  The closest margin from invasive carcinoma is 2 mm anteriorly and medially and the medial margin from DCIS is positive for carcinoma over an area of 1 mm.  Superior and posterior margins are less than 1 mm.      After discussion in the multidisciplinary breast conference, the recommendation was to undergo re-excision of the lumpectomy cavity to obtain clear margins.  This was done on May 3, 2023.  Pathology revealed intermediate grade residual DCIS present in 2 of 17 tissue blocks.  There was no residual invasive tumor noted.  The closest margins from the DCIS is within less than 1 mm of the posterior margin and the lateral margin.  It is 2 mm from the superior margin.  All other margins were greater than 10 mm.  There was atypical ductal hyperplasia present.  She is recommended to follow up with radiation oncology for discussion regarding postoperative radiation given the close margin from  DCIS after re-excision.  After multiple attempts and cancellations, she returns today for further discussion.  After evaluation by Dr. Sorto, patient is not a candidate for endocrine therapy.    At present, patient is healing from surgery without any unexpected side effects.  She denies left breast pain, edema, erythema, nipple discharge.  She also denies fever, night sweats, or weight loss. Patient is Irish speaking and is here today with her daughter who is the power .      PHYSICAL EXAMINATION:  Vitals:    07/07/23 1259   BP: (!) 159/75   Pulse: 86   Resp: 18   Temp: 98 °F (36.7 °C)   Weight: 56.3 kg (124 lb 0.1 oz)   Body mass index is 23.43 kg/m².   GENERAL: Patient is alert and oriented, in no acute distress.  HEENT: Extraocular muscles are intact.  Oropharynx is clear without lesions.  There is no cervical or supraclavicular adenopathy palpated.    CHEST: Breath sounds clear bilaterally.  No rales.  No rhonchi.  Unlabored respirations.  CARDIOVASCULAR: Normal S1, S2.  Normal rate.  Regular rhythm.  BREAST EXAM: The scar secondary to lumpectomy is noted in the amilcar areolar region of the left breast. There is fibrosis palpated associated with the scar. No abnormal masses palpated in the left breast or left axilla.  ABDOMEN: Bowel sounds normal.  No tenderness.  No abdominal distention.  No hepatomegaly.  No splenomegaly.  EXTREMITIES: No clubbing, cyanosis, edema  NEUROLOGIC: Cranial nerves II through XII are grossly intact.  Sensation is intact.  Strength is 5 out of 5 in the upper and lower extremities bilaterally.  BREAST EXAM:  The scar secondary to lumpectomy is noted in the amilcar areolar region of the left breast. There is fibrosis palpated associated with the scar. No abnormal masses palpated in the left breast or left axilla.  ABDOMEN:Soft, nontender, nondistended, without hepatosplenomegaly.  Normoactive bowel sounds.  EXTREMITIES: No clubbing, cyanosis, or edema.  NEUROLOGICAL: Cranial nerve  II through XII grossly intact.  Sensation is intact.  Strength is 5 out of 5 in the upper and lower extremities bilaterally.     ASSESSMENT:   This is an 89-year-old female with multifocal pT1a Nx M0, stage IA, grade 1, invasive ductal carcinoma of the left breast who underwent lumpectomy on March 3, 2023 with 3 subcentimeter foci of invasive carcinoma associated with 40 mm area of DCIS, with pathology revealing positive medial margin and multiple close margins from DCIS (ER/SC positive) for which she underwent re-excision of the lumpectomy cavity on May 3, 2023 with pathology revealing residual DCIS with the closest margin within less than 1 mm of the posterior and lateral margins..     PLAN:   After discussion in the multidisciplinary breast conference and review of the pathology and images of the radiological studies, Ms. Holman is noted to have positive medial margin and superior and medial margin at less than 1 mm from DCIS.  After discussion the breast Conference, recommendation was for re-excision to obtain negative margins.  This was completed as above.  However, she was noted to have close margins posteriorly and laterally at less than 1 mm.  I had a long discussion with the patient and her family who are here today regarding the risks, benefits, and side effects of partial breast irradiation versus whole breast radiation versus observation in the setting.  All of their questions were answered.  Given her advanced age and history of dementia, I recommend partial breast irradiation.  She would like to proceed with partial breast irradiation.  I plan to deliver approximately 3000 cGy in 5 fractions.      The risks, benefits, and side effects of radiation were explained in detail to the patient and her daughter who is the power of .  All questions were answered and informed consent was signed.  I plan to see the patient back for radiation planning CT during the next week.

## 2023-07-07 ENCOUNTER — OFFICE VISIT (OUTPATIENT)
Dept: RADIATION ONCOLOGY | Facility: CLINIC | Age: 88
End: 2023-07-07
Payer: MEDICARE

## 2023-07-07 VITALS
WEIGHT: 124 LBS | SYSTOLIC BLOOD PRESSURE: 159 MMHG | DIASTOLIC BLOOD PRESSURE: 75 MMHG | BODY MASS INDEX: 23.43 KG/M2 | RESPIRATION RATE: 18 BRPM | TEMPERATURE: 98 F | HEART RATE: 86 BPM

## 2023-07-07 DIAGNOSIS — D05.12 BREAST NEOPLASM, TIS (DCIS), LEFT: ICD-10-CM

## 2023-07-07 DIAGNOSIS — C50.112 MALIGNANT NEOPLASM OF CENTRAL PORTION OF LEFT BREAST IN FEMALE, ESTROGEN RECEPTOR POSITIVE: Primary | ICD-10-CM

## 2023-07-07 DIAGNOSIS — Z17.0 MALIGNANT NEOPLASM OF CENTRAL PORTION OF LEFT BREAST IN FEMALE, ESTROGEN RECEPTOR POSITIVE: Primary | ICD-10-CM

## 2023-07-07 PROCEDURE — 1160F PR REVIEW ALL MEDS BY PRESCRIBER/CLIN PHARMACIST DOCUMENTED: ICD-10-PCS | Mod: CPTII,S$GLB,, | Performed by: RADIOLOGY

## 2023-07-07 PROCEDURE — 1160F RVW MEDS BY RX/DR IN RCRD: CPT | Mod: CPTII,S$GLB,, | Performed by: RADIOLOGY

## 2023-07-07 PROCEDURE — 99999 PR PBB SHADOW E&M-EST. PATIENT-LVL III: CPT | Mod: PBBFAC,,, | Performed by: RADIOLOGY

## 2023-07-07 PROCEDURE — 3288F FALL RISK ASSESSMENT DOCD: CPT | Mod: CPTII,S$GLB,, | Performed by: RADIOLOGY

## 2023-07-07 PROCEDURE — 1159F MED LIST DOCD IN RCRD: CPT | Mod: CPTII,S$GLB,, | Performed by: RADIOLOGY

## 2023-07-07 PROCEDURE — 1126F PR PAIN SEVERITY QUANTIFIED, NO PAIN PRESENT: ICD-10-PCS | Mod: CPTII,S$GLB,, | Performed by: RADIOLOGY

## 2023-07-07 PROCEDURE — 1126F AMNT PAIN NOTED NONE PRSNT: CPT | Mod: CPTII,S$GLB,, | Performed by: RADIOLOGY

## 2023-07-07 PROCEDURE — 1159F PR MEDICATION LIST DOCUMENTED IN MEDICAL RECORD: ICD-10-PCS | Mod: CPTII,S$GLB,, | Performed by: RADIOLOGY

## 2023-07-07 PROCEDURE — 99214 OFFICE O/P EST MOD 30 MIN: CPT | Mod: S$GLB,,, | Performed by: RADIOLOGY

## 2023-07-07 PROCEDURE — 1101F PR PT FALLS ASSESS DOC 0-1 FALLS W/OUT INJ PAST YR: ICD-10-PCS | Mod: CPTII,S$GLB,, | Performed by: RADIOLOGY

## 2023-07-07 PROCEDURE — 3288F PR FALLS RISK ASSESSMENT DOCUMENTED: ICD-10-PCS | Mod: CPTII,S$GLB,, | Performed by: RADIOLOGY

## 2023-07-07 PROCEDURE — 99214 PR OFFICE/OUTPT VISIT, EST, LEVL IV, 30-39 MIN: ICD-10-PCS | Mod: S$GLB,,, | Performed by: RADIOLOGY

## 2023-07-07 PROCEDURE — 99999 PR PBB SHADOW E&M-EST. PATIENT-LVL III: ICD-10-PCS | Mod: PBBFAC,,, | Performed by: RADIOLOGY

## 2023-07-07 PROCEDURE — 1101F PT FALLS ASSESS-DOCD LE1/YR: CPT | Mod: CPTII,S$GLB,, | Performed by: RADIOLOGY

## 2023-07-11 ENCOUNTER — HOSPITAL ENCOUNTER (OUTPATIENT)
Dept: RADIATION THERAPY | Facility: HOSPITAL | Age: 88
Discharge: HOME | End: 2023-07-11
Attending: RADIOLOGY
Payer: MEDICARE

## 2023-07-11 PROCEDURE — 77014 HC CT GUIDANCE RADIATION THERAPY FLDS PLACEMENT: CPT | Mod: TC | Performed by: RADIOLOGY

## 2023-07-11 PROCEDURE — 77332 PR  RADN TREATMENT AID(S) SIMPLE: ICD-10-PCS | Mod: 26,,, | Performed by: RADIOLOGY

## 2023-07-11 PROCEDURE — 77332 RADIATION TREATMENT AID(S): CPT | Mod: TC | Performed by: RADIOLOGY

## 2023-07-11 PROCEDURE — 77263 PR  RADIATION THERAPY PLAN COMPLEX: ICD-10-PCS | Mod: ,,, | Performed by: RADIOLOGY

## 2023-07-11 PROCEDURE — 77014 PR  CT GUIDANCE PLACEMENT RAD THERAPY FIELDS: CPT | Mod: 26,,, | Performed by: RADIOLOGY

## 2023-07-11 PROCEDURE — 77332 RADIATION TREATMENT AID(S): CPT | Mod: 26,,, | Performed by: RADIOLOGY

## 2023-07-11 PROCEDURE — 77014 PR  CT GUIDANCE PLACEMENT RAD THERAPY FIELDS: ICD-10-PCS | Mod: 26,,, | Performed by: RADIOLOGY

## 2023-07-11 PROCEDURE — 77263 THER RADIOLOGY TX PLNG CPLX: CPT | Mod: ,,, | Performed by: RADIOLOGY

## 2023-07-25 PROCEDURE — 77295 3-D RADIOTHERAPY PLAN: CPT | Mod: 26,,, | Performed by: RADIOLOGY

## 2023-07-25 PROCEDURE — 77295 3-D RADIOTHERAPY PLAN: CPT | Mod: TC | Performed by: RADIOLOGY

## 2023-07-25 PROCEDURE — 77334 PR  RADN TREATMENT AID(S) COMPLX: ICD-10-PCS | Mod: 26,,, | Performed by: RADIOLOGY

## 2023-07-25 PROCEDURE — 77300 RADIATION THERAPY DOSE PLAN: CPT | Mod: TC | Performed by: RADIOLOGY

## 2023-07-25 PROCEDURE — 77295 PR 3D RADIOTHERAPY PLAN: ICD-10-PCS | Mod: 26,,, | Performed by: RADIOLOGY

## 2023-07-25 PROCEDURE — 77300 PR RADIATION THERAPY,DOSIMETRY PLAN: ICD-10-PCS | Mod: 26,,, | Performed by: RADIOLOGY

## 2023-07-25 PROCEDURE — 77334 RADIATION TREATMENT AID(S): CPT | Mod: 26,,, | Performed by: RADIOLOGY

## 2023-07-25 PROCEDURE — 77300 RADIATION THERAPY DOSE PLAN: CPT | Mod: 26,,, | Performed by: RADIOLOGY

## 2023-07-25 PROCEDURE — 77334 RADIATION TREATMENT AID(S): CPT | Mod: TC | Performed by: RADIOLOGY

## 2023-07-31 PROCEDURE — 77417 THER RADIOLOGY PORT IMAGE(S): CPT | Performed by: RADIOLOGY

## 2023-07-31 PROCEDURE — 77427 RADIATION TX MANAGEMENT X5: CPT | Mod: ,,, | Performed by: RADIOLOGY

## 2023-07-31 PROCEDURE — 77412 RADIATION TX DELIVERY LVL 3: CPT | Performed by: RADIOLOGY

## 2023-07-31 PROCEDURE — 77387 GUIDANCE FOR RADJ TX DLVR: CPT | Mod: TC | Performed by: RADIOLOGY

## 2023-07-31 PROCEDURE — G6002 PR STEREOSCOPIC XRAY GUIDE FOR RADIATION TX DELIV: ICD-10-PCS | Mod: 26,,, | Performed by: RADIOLOGY

## 2023-07-31 PROCEDURE — 77427 PR CHG RADIATION,MANGEMENT,5 TX'S: ICD-10-PCS | Mod: ,,, | Performed by: RADIOLOGY

## 2023-07-31 PROCEDURE — G6002 STEREOSCOPIC X-RAY GUIDANCE: HCPCS | Mod: 26,,, | Performed by: RADIOLOGY

## 2023-08-01 ENCOUNTER — APPOINTMENT (OUTPATIENT)
Dept: RADIATION THERAPY | Facility: OTHER | Age: 88
End: 2023-08-01
Attending: RADIOLOGY
Payer: MEDICARE

## 2023-08-01 PROCEDURE — G6002 STEREOSCOPIC X-RAY GUIDANCE: HCPCS | Mod: 26,,, | Performed by: RADIOLOGY

## 2023-08-01 PROCEDURE — 77387 GUIDANCE FOR RADJ TX DLVR: CPT | Mod: TC | Performed by: RADIOLOGY

## 2023-08-01 PROCEDURE — 77412 RADIATION TX DELIVERY LVL 3: CPT | Performed by: RADIOLOGY

## 2023-08-01 PROCEDURE — G6002 PR STEREOSCOPIC XRAY GUIDE FOR RADIATION TX DELIV: ICD-10-PCS | Mod: 26,,, | Performed by: RADIOLOGY

## 2023-08-02 PROCEDURE — G6002 STEREOSCOPIC X-RAY GUIDANCE: HCPCS | Mod: 26,,, | Performed by: RADIOLOGY

## 2023-08-02 PROCEDURE — 77387 GUIDANCE FOR RADJ TX DLVR: CPT | Mod: TC | Performed by: RADIOLOGY

## 2023-08-02 PROCEDURE — G6002 PR STEREOSCOPIC XRAY GUIDE FOR RADIATION TX DELIV: ICD-10-PCS | Mod: 26,,, | Performed by: RADIOLOGY

## 2023-08-02 PROCEDURE — 77412 RADIATION TX DELIVERY LVL 3: CPT | Performed by: RADIOLOGY

## 2023-08-03 PROCEDURE — 77387 GUIDANCE FOR RADJ TX DLVR: CPT | Mod: TC | Performed by: RADIOLOGY

## 2023-08-03 PROCEDURE — G6002 STEREOSCOPIC X-RAY GUIDANCE: HCPCS | Mod: 26,,, | Performed by: RADIOLOGY

## 2023-08-03 PROCEDURE — 77412 RADIATION TX DELIVERY LVL 3: CPT | Performed by: RADIOLOGY

## 2023-08-03 PROCEDURE — G6002 PR STEREOSCOPIC XRAY GUIDE FOR RADIATION TX DELIV: ICD-10-PCS | Mod: 26,,, | Performed by: RADIOLOGY

## 2023-08-04 PROCEDURE — G6002 PR STEREOSCOPIC XRAY GUIDE FOR RADIATION TX DELIV: ICD-10-PCS | Mod: 26,,, | Performed by: RADIOLOGY

## 2023-08-04 PROCEDURE — 77412 RADIATION TX DELIVERY LVL 3: CPT | Performed by: RADIOLOGY

## 2023-08-04 PROCEDURE — 77387 GUIDANCE FOR RADJ TX DLVR: CPT | Mod: TC | Performed by: RADIOLOGY

## 2023-08-04 PROCEDURE — G6002 STEREOSCOPIC X-RAY GUIDANCE: HCPCS | Mod: 26,,, | Performed by: RADIOLOGY

## 2023-08-04 PROCEDURE — 77336 RADIATION PHYSICS CONSULT: CPT | Performed by: RADIOLOGY

## 2023-08-18 ENCOUNTER — TELEPHONE (OUTPATIENT)
Dept: RADIATION ONCOLOGY | Facility: CLINIC | Age: 88
End: 2023-08-18
Payer: MEDICARE

## 2023-08-18 NOTE — TELEPHONE ENCOUNTER
Call to pt's daughter to see how she is doing since completing partial left breast radiation 8/4/23. Daughter, Anastasia, reports that pt is doing well. No skin issues reported. Appt for f/u scheduled 11/3/23 in the afternoon per Anastasia's request.

## 2023-11-09 ENCOUNTER — TELEPHONE (OUTPATIENT)
Dept: RADIATION ONCOLOGY | Facility: CLINIC | Age: 88
End: 2023-11-09
Payer: MEDICARE

## 2023-11-09 NOTE — TELEPHONE ENCOUNTER
----- Message from Monae Brooks RN sent at 11/9/2023  3:05 PM CST -----  Regarding: FW: would like to reschedule    ----- Message -----  From: Dominique Gibson  Sent: 11/9/2023  11:17 AM CST  To: Monae Brooks RN  Subject: would like to reschedule                         Patient has an apt for the 17th and would like to reschedule for an earlier time that morning. Please call back at [phone].    Thank you,   Dominique

## 2023-11-09 NOTE — TELEPHONE ENCOUNTER
Return call to sharon Anastasia. Appt rescheduled to 11/16/23 in the morning as a morning time was not available on 11/17/23. Anastasia verbalized understanding.

## 2023-11-15 NOTE — PROGRESS NOTES
REFERRING PHYSICIAN: Rosmery Cotter MD, Jeanne Sorto MD    DIAGNOSIS: pmT1a Nx M0, stage IA, invasive ductal carcinoma the left breast    Radiation Treatment Summary  Course: C1 Breast 2023  Treatment Site Energy Dose/Fx (Gy) #Fx Total Dose (Gy) Start Date End Date Elapsed Days   Breast_Lt 6X 5.2 5 / 5 26 7/31/2023 8/4/2023 4     INTERVAL HISTORY:   Ms. Holman is an 89-year-old female with dimentia who completed partial breast irradiation to the left breast as above who returns for routine follow-up.    She wasdiagnosed with left breast cancer after evaluation for a palpable mass in the central aspect of the left breast. Mammogram and ultrasound on January 29, 2023 revealed an oval mass corresponding to the palpable lesion. This measured 2 x 1.1 x 2.5 cm. 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 She underwent lumpectomy on March 3, 2023. Pathology revealed the left breast with multifocal, grade 1, invasive ductal carcinoma measuring 0.3 cm, 0.15 cm, and 1 mm with associated grade 1, DCIS, measuring 40 mm. The closest margin from invasive carcinoma is 2 mm anteriorly and medially and the medial margin from DCIS is positive for carcinoma over an area of 1 mm. Superior and posterior margins are less than 1 mm. To reduce her chance of local recurrence, she receivced post operative irradiation to the left breast as above.  She is here today for routine follow-up.  After follow-up with Dr. Sorto, no further endocrine therapy is planned.    At present, she denies left breast pain, edema, erythema, or nipple discharge.  She also denies fever, night sweats, or weight loss.    PHYSICAL EXAMINATION:  Vitals:    11/16/23 0927   BP: 132/60   BP Location: Left arm   Patient Position: Sitting   BP Method: Medium (Automatic)   Pulse: 104   Temp: 98.2 °F (36.8 °C)   TempSrc: Oral   SpO2: 98%   Weight:  57.1 kg (125 lb 14.1 oz)   Body mass index is 23.79 kg/m².   GENERAL: Patient is alert and oriented, in no acute distress.  HEENT: Extraocular muscles are intact.  Oropharynx is clear without lesions.  There is no cervical or supraclavicular adenopathy palpated.    CHEST: Breath sounds clear bilaterally.  No rales.  No rhonchi.  Unlabored respirations.  CARDIOVASCULAR: Normal S1, S2.  Normal rate.  Regular rhythm.  BREAST EXAM: Mild hyperpigmentation of the skin of the left breast secondary to radiaiton noted. The scar secondary to lumpectomy is noted in the amilcar areolar region of the left breast. There is fibrosis palpated associated with the scar. No abnormal masses palpated in the left breast or left axilla. There is a palpable round nodule in the upper outer quadrant of the right breast without change. No right axillary masses.  ABDOMEN: Bowel sounds normal.  No tenderness.  No abdominal distention.  No hepatomegaly.  No splenomegaly.  EXTREMITIES: No clubbing, cyanosis, edema  NEUROLOGIC: Cranial nerves II through XII are grossly intact.  Sensation is intact.  Strength is 5 out of 5 in the upper and lower extremities bilaterally.    ASSESSMENT:  This is an 89-year-old female with multifocal pT1a Nx M0, stage IA, grade 1, invasive ductal carcinoma of the left breast who underwent lumpectomy on March 3, 2023 with 3 subcentimeter foci of invasive carcinoma associated with 40 mm area of DCIS, with pathology revealing positive medial margin and multiple close margins from DCIS, ER/TX positive.    PLAN:  Ms. Holman is recovering from radiation without any unexpected side effects.  Skin care to the treated area was again reviewed with the patient and family.  She will repeat yearly mammogram in January 2024.  She will continue follow-up with Dr. Cotter as planned.  I plan to see her back as needed, unless symptoms warrant otherwise.

## 2023-11-16 ENCOUNTER — OFFICE VISIT (OUTPATIENT)
Dept: RADIATION ONCOLOGY | Facility: CLINIC | Age: 88
End: 2023-11-16
Payer: MEDICARE

## 2023-11-16 VITALS
SYSTOLIC BLOOD PRESSURE: 132 MMHG | DIASTOLIC BLOOD PRESSURE: 60 MMHG | BODY MASS INDEX: 23.79 KG/M2 | TEMPERATURE: 98 F | HEART RATE: 104 BPM | WEIGHT: 125.88 LBS | OXYGEN SATURATION: 98 %

## 2023-11-16 DIAGNOSIS — Z17.0 MALIGNANT NEOPLASM OF CENTRAL PORTION OF LEFT BREAST IN FEMALE, ESTROGEN RECEPTOR POSITIVE: Primary | ICD-10-CM

## 2023-11-16 DIAGNOSIS — C50.112 MALIGNANT NEOPLASM OF CENTRAL PORTION OF LEFT BREAST IN FEMALE, ESTROGEN RECEPTOR POSITIVE: Primary | ICD-10-CM

## 2023-11-16 PROCEDURE — 1159F MED LIST DOCD IN RCRD: CPT | Mod: CPTII,S$GLB,, | Performed by: RADIOLOGY

## 2023-11-16 PROCEDURE — 1126F PR PAIN SEVERITY QUANTIFIED, NO PAIN PRESENT: ICD-10-PCS | Mod: CPTII,S$GLB,, | Performed by: RADIOLOGY

## 2023-11-16 PROCEDURE — 1101F PR PT FALLS ASSESS DOC 0-1 FALLS W/OUT INJ PAST YR: ICD-10-PCS | Mod: CPTII,S$GLB,, | Performed by: RADIOLOGY

## 2023-11-16 PROCEDURE — 99999 PR PBB SHADOW E&M-EST. PATIENT-LVL III: ICD-10-PCS | Mod: PBBFAC,,, | Performed by: RADIOLOGY

## 2023-11-16 PROCEDURE — 3288F PR FALLS RISK ASSESSMENT DOCUMENTED: ICD-10-PCS | Mod: CPTII,S$GLB,, | Performed by: RADIOLOGY

## 2023-11-16 PROCEDURE — 99213 OFFICE O/P EST LOW 20 MIN: CPT | Mod: S$GLB,,, | Performed by: RADIOLOGY

## 2023-11-16 PROCEDURE — 99213 PR OFFICE/OUTPT VISIT, EST, LEVL III, 20-29 MIN: ICD-10-PCS | Mod: S$GLB,,, | Performed by: RADIOLOGY

## 2023-11-16 PROCEDURE — 3288F FALL RISK ASSESSMENT DOCD: CPT | Mod: CPTII,S$GLB,, | Performed by: RADIOLOGY

## 2023-11-16 PROCEDURE — 1160F RVW MEDS BY RX/DR IN RCRD: CPT | Mod: CPTII,S$GLB,, | Performed by: RADIOLOGY

## 2023-11-16 PROCEDURE — 1101F PT FALLS ASSESS-DOCD LE1/YR: CPT | Mod: CPTII,S$GLB,, | Performed by: RADIOLOGY

## 2023-11-16 PROCEDURE — 1159F PR MEDICATION LIST DOCUMENTED IN MEDICAL RECORD: ICD-10-PCS | Mod: CPTII,S$GLB,, | Performed by: RADIOLOGY

## 2023-11-16 PROCEDURE — 1126F AMNT PAIN NOTED NONE PRSNT: CPT | Mod: CPTII,S$GLB,, | Performed by: RADIOLOGY

## 2023-11-16 PROCEDURE — 99999 PR PBB SHADOW E&M-EST. PATIENT-LVL III: CPT | Mod: PBBFAC,,, | Performed by: RADIOLOGY

## 2023-11-16 PROCEDURE — 1160F PR REVIEW ALL MEDS BY PRESCRIBER/CLIN PHARMACIST DOCUMENTED: ICD-10-PCS | Mod: CPTII,S$GLB,, | Performed by: RADIOLOGY

## 2023-12-14 ENCOUNTER — OFFICE VISIT (OUTPATIENT)
Dept: INTERNAL MEDICINE | Facility: CLINIC | Age: 88
End: 2023-12-14
Attending: INTERNAL MEDICINE
Payer: MEDICARE

## 2023-12-14 VITALS
DIASTOLIC BLOOD PRESSURE: 64 MMHG | SYSTOLIC BLOOD PRESSURE: 120 MMHG | BODY MASS INDEX: 23.03 KG/M2 | WEIGHT: 122 LBS | HEIGHT: 61 IN | HEART RATE: 97 BPM | OXYGEN SATURATION: 99 %

## 2023-12-14 DIAGNOSIS — N25.81 HYPERPARATHYROIDISM DUE TO RENAL INSUFFICIENCY: ICD-10-CM

## 2023-12-14 DIAGNOSIS — E83.52 HYPERCALCEMIA: Primary | ICD-10-CM

## 2023-12-14 DIAGNOSIS — J45.20 MILD INTERMITTENT ASTHMA WITHOUT COMPLICATION: ICD-10-CM

## 2023-12-14 DIAGNOSIS — D05.12 BREAST NEOPLASM, TIS (DCIS), LEFT: ICD-10-CM

## 2023-12-14 DIAGNOSIS — D69.6 THROMBOCYTOPENIA: ICD-10-CM

## 2023-12-14 DIAGNOSIS — R79.89 OTHER SPECIFIED ABNORMAL FINDINGS OF BLOOD CHEMISTRY: ICD-10-CM

## 2023-12-14 DIAGNOSIS — E78.9 DISORDER OF LIPID METABOLISM: ICD-10-CM

## 2023-12-14 DIAGNOSIS — R41.3 MEMORY LOSS: ICD-10-CM

## 2023-12-14 DIAGNOSIS — E03.9 ACQUIRED HYPOTHYROIDISM: Primary | ICD-10-CM

## 2023-12-14 DIAGNOSIS — N18.31 STAGE 3A CHRONIC KIDNEY DISEASE: ICD-10-CM

## 2023-12-14 DIAGNOSIS — D50.9 IRON DEFICIENCY ANEMIA, UNSPECIFIED IRON DEFICIENCY ANEMIA TYPE: ICD-10-CM

## 2023-12-14 DIAGNOSIS — E55.9 VITAMIN D DEFICIENCY: ICD-10-CM

## 2023-12-14 DIAGNOSIS — D50.8 OTHER IRON DEFICIENCY ANEMIA: ICD-10-CM

## 2023-12-14 DIAGNOSIS — I10 ESSENTIAL HYPERTENSION: ICD-10-CM

## 2023-12-14 DIAGNOSIS — D51.0 PERNICIOUS ANEMIA: ICD-10-CM

## 2023-12-14 DIAGNOSIS — E03.9 HYPOTHYROIDISM, UNSPECIFIED TYPE: ICD-10-CM

## 2023-12-14 PROCEDURE — 99214 PR OFFICE/OUTPT VISIT, EST, LEVL IV, 30-39 MIN: ICD-10-PCS | Mod: S$GLB,,, | Performed by: INTERNAL MEDICINE

## 2023-12-14 PROCEDURE — 1159F PR MEDICATION LIST DOCUMENTED IN MEDICAL RECORD: ICD-10-PCS | Mod: CPTII,S$GLB,, | Performed by: INTERNAL MEDICINE

## 2023-12-14 PROCEDURE — 1160F PR REVIEW ALL MEDS BY PRESCRIBER/CLIN PHARMACIST DOCUMENTED: ICD-10-PCS | Mod: CPTII,S$GLB,, | Performed by: INTERNAL MEDICINE

## 2023-12-14 PROCEDURE — 1160F RVW MEDS BY RX/DR IN RCRD: CPT | Mod: CPTII,S$GLB,, | Performed by: INTERNAL MEDICINE

## 2023-12-14 PROCEDURE — 99214 OFFICE O/P EST MOD 30 MIN: CPT | Mod: S$GLB,,, | Performed by: INTERNAL MEDICINE

## 2023-12-14 PROCEDURE — 1159F MED LIST DOCD IN RCRD: CPT | Mod: CPTII,S$GLB,, | Performed by: INTERNAL MEDICINE

## 2023-12-14 RX ORDER — DONEPEZIL HYDROCHLORIDE 5 MG/1
5 TABLET, FILM COATED ORAL NIGHTLY
Qty: 30 TABLET | Refills: 5 | Status: SHIPPED | OUTPATIENT
Start: 2023-12-14 | End: 2024-12-13

## 2023-12-14 NOTE — PROGRESS NOTES
Subjective     Patient ID: Ashely Holman is a 89 y.o. female.    Chief Complaint: Follow-up (Refill iron, Breo, Albuterol Inhaler, dementia is getting worse )    Generally she feels pretty good.  She does not take the Breo every day.  Her daughter says her memory is getting worse.      Hypertension  This is a chronic problem. The current episode started more than 1 year ago. The problem is controlled.           Follow-up  Associated symptoms include coughing. Pertinent negatives include no chest pain.     Review of Systems   Constitutional: Negative.    Respiratory:  Positive for cough. Negative for shortness of breath.    Cardiovascular:  Negative for chest pain.   Neurological:  Positive for memory loss.          Objective     Physical Exam  Vitals and nursing note reviewed.   Constitutional:       Appearance: She is well-developed.   HENT:      Head: Normocephalic.   Eyes:      Pupils: Pupils are equal, round, and reactive to light.   Cardiovascular:      Rate and Rhythm: Normal rate and regular rhythm.      Heart sounds: Normal heart sounds.   Pulmonary:      Effort: Pulmonary effort is normal.      Breath sounds: Rhonchi present.   Neurological:      Mental Status: She is alert.            Assessment and Plan     1. Acquired hypothyroidism    2. Thrombocytopenia  Overview:  9/16      3. Iron deficiency anemia, unspecified iron deficiency anemia type  Overview:  1990  video cam neg for bleed 2010  Dr. Lo  EGD 2011 - [facility]      4. Hyperparathyroidism due to renal insufficiency    5. Mild intermittent asthma without complication    6. Stage 3a chronic kidney disease    7. Essential hypertension    8. Memory loss    9. Breast neoplasm, Tis (DCIS), left  Overview:  S/p partial mastectomy left breast 3/23 and 5/23 and XRT      Other orders  -     donepeziL (ARICEPT) 5 MG tablet; Take 1 tablet (5 mg total) by mouth every evening.  Dispense: 30 tablet; Refill: 5        PLAN:  Per orders and D/C  instructions.  Continue diet and/or meds for Asthma, HTN, and CKD, which are stable.  Her recent breast cancer stable.  Check labs to include labs for hyperparathyroidism, hypothyroidism, and thrombocytopenia, all of which are stable.  Start Aricept for memory loss.    Between 30 and 39 min of total time for evaluation and management services were spent on the patient today.  The medical problems and treatment options were discussed, and all questions were answered.           Follow up in about 6 months (around 6/14/2024).

## 2023-12-27 ENCOUNTER — HOSPITAL ENCOUNTER (OUTPATIENT)
Dept: RADIOLOGY | Facility: HOSPITAL | Age: 88
Discharge: HOME | End: 2023-12-27
Attending: SURGERY
Payer: MEDICARE

## 2023-12-27 VITALS — BODY MASS INDEX: 23.03 KG/M2 | HEIGHT: 61 IN | WEIGHT: 122 LBS

## 2023-12-27 DIAGNOSIS — Z12.31 SCREENING MAMMOGRAM, ENCOUNTER FOR: ICD-10-CM

## 2024-01-05 ENCOUNTER — OFFICE VISIT (OUTPATIENT)
Dept: SURGERY | Facility: CLINIC | Age: 89
End: 2024-01-05
Payer: MEDICARE

## 2024-01-05 ENCOUNTER — HOSPITAL ENCOUNTER (OUTPATIENT)
Dept: RADIOLOGY | Facility: HOSPITAL | Age: 89
Discharge: HOME | End: 2024-01-05
Attending: SURGERY
Payer: MEDICARE

## 2024-01-05 VITALS
BODY MASS INDEX: 23.03 KG/M2 | DIASTOLIC BLOOD PRESSURE: 70 MMHG | SYSTOLIC BLOOD PRESSURE: 150 MMHG | HEIGHT: 61 IN | WEIGHT: 122 LBS

## 2024-01-05 VITALS — BODY MASS INDEX: 23.03 KG/M2 | WEIGHT: 122 LBS | HEIGHT: 61 IN

## 2024-01-05 DIAGNOSIS — Z98.890 S/P LUMPECTOMY, LEFT BREAST: ICD-10-CM

## 2024-01-05 DIAGNOSIS — Z85.3 PERSONAL HISTORY OF BREAST CANCER: Primary | ICD-10-CM

## 2024-01-05 DIAGNOSIS — Z12.39 SCREENING BREAST EXAMINATION: ICD-10-CM

## 2024-01-05 PROCEDURE — 99999 PR PBB SHADOW E&M-EST. PATIENT-LVL III: CPT | Mod: PBBFAC,,, | Performed by: PHYSICIAN ASSISTANT

## 2024-01-05 PROCEDURE — 77067 SCR MAMMO BI INCL CAD: CPT | Mod: TC

## 2024-01-05 PROCEDURE — 77063 BREAST TOMOSYNTHESIS BI: CPT | Mod: 26,,, | Performed by: RADIOLOGY

## 2024-01-05 PROCEDURE — 77067 SCR MAMMO BI INCL CAD: CPT | Mod: 26,,, | Performed by: RADIOLOGY

## 2024-01-05 PROCEDURE — 99203 OFFICE O/P NEW LOW 30 MIN: CPT | Mod: S$GLB,,, | Performed by: PHYSICIAN ASSISTANT

## 2024-01-05 NOTE — PROGRESS NOTES
[facility]  Department of Surgery    PCP:  ANURADHA Badillo MD  CHIEF COMPLAINT:   Chief Complaint   Patient presents with    Follow-up       DIAGNOSIS:   This is a 89 y.o. female with a history of  stage pT1a Nx grade 1 ER + TN + HER2 - invasive ductal carcinoma of the left breast.     TREATMENT:   1. left partial mastectomy with sentinel node biopsy and duct excision on 3/3/2023. Dr. Vahe M.D. Surgical Oncology  2. Final Path: Multifocal invasive and microinvasive ductal carcinoma grade 1. All margins are negative for invasive. Medial margin positive for DCIS and DCIS is <1mm from superior, inferior, and lateral margins. DCIS is 1 mm from inferior margin and 2 mm from anterior margin. The patient proceeded with re-excision on 5/3/2023.  Final pathology showed residual DCIS with close posterior margin (< 1mm), all other margins are clear. No evidence of invasive carcinoma.    3. Radiation therapy completed with Dr. Mirna M.D. Radiation Oncology       HISTORY OF PRESENT ILLNESS:   Ashely Holman is a 89 y.o. female comes in for oncological follow up.  She denies change in her breast self-exam specifically denying new masses, skin or nipple changes, or nipple discharge. Past medical and surgical history is updated without new changes. There have been no changes to family history. The patient denies constitutional symptoms of night sweats, chills, weight loss, new headaches, visual changes, new back or bony pain, chest pain, or shortness of breath.        Review of Systems: See HPI/Interval History for other systems reviewed.     IMAGIN/5/24 Bilateral Screening Mammo: pending      MEDICATIONS/ALLERGIES:     Current Outpatient Medications   Medication Sig    cholecalciferol, vitamin D3, 2,000 unit Cap Take 1 capsule by mouth once daily.    donepeziL (ARICEPT) 5 MG tablet Take 1 tablet (5 mg total) by mouth every evening.    ferrous gluconate (FERGON) 324 MG tablet Take 1 tablet (324 mg total)  "by mouth once daily. WITH LUNCH    fluticasone furoate-vilanteroL (BREO ELLIPTA) 200-25 mcg/dose DsDv diskus inhaler Inhale 1 puff into the lungs once daily. Controller (Patient taking differently: Inhale 1 puff into the lungs nightly. Controller)    levothyroxine (SYNTHROID) 100 MCG tablet TAKE 1 TABLET(100 MCG) BY MOUTH BEFORE BREAKFAST    losartan (COZAAR) 100 MG tablet TAKE 1 TABLET(100 MG) BY MOUTH EVERY DAY    timolol maleate 0.5% (TIMOPTIC) 0.5 % Drop     travoprost, benzalkonium, (TRAVATAN) 0.004 % ophthalmic solution 1 drop every evening.     No current facility-administered medications for this visit.      Review of patient's allergies indicates:   Allergen Reactions    Lyrica [pregabalin] Rash    Cymbalta [duloxetine] Nausea And Vomiting       PHYSICAL EXAM:   BP (!) 150/70 (BP Location: Right arm, Patient Position: Sitting, BP Method: Medium (Automatic))   Ht 5' 1" (1.549 m)   Wt 55.3 kg (122 lb)   BMI 23.05 kg/m²     Physical Exam   Vitals reviewed.  Constitutional: She is oriented to person, place, and time.   HENT:   Head: Normocephalic and atraumatic.   Nose: Nose normal.   Eyes: Pupils are equal, round, and reactive to light. Right eye exhibits no discharge. Left eye exhibits no discharge.   Pulmonary/Chest: Effort normal and breath sounds normal. No stridor. No respiratory distress. She exhibits no mass, no tenderness and no edema. Right breast exhibits no inverted nipple, no mass, no nipple discharge, no skin change and no tenderness. Left breast exhibits no inverted nipple, no mass, no nipple discharge, no skin change and no tenderness. No breast swelling or bleeding. Breasts are symmetrical.   Abdominal: Normal appearance.   Genitourinary: No breast swelling or bleeding.   Neurological: She is alert and oriented to person, place, and time.   Skin: Skin is warm and dry.     Psychiatric: Her behavior is normal. Mood, judgment and thought content normal.       ASSESSMENT:   This is a 89 y.o. " female without evidence of recurrence by exam, history or imaging.       PLAN:   1. CBE without concerning findings. Patient reassured.   2. Continue monthly self breast exams and call the clinic with any changes or problems.  3. Mammogram still pending. Will be in touch with results.   4. Otherwise return to clinic in 1 year  with Dr. Cotter.    The patient is in agreement with the plan. Questions were encouraged and answered to patient's satisfaction. Ashely will call our office with any questions or concerns.

## 2024-02-14 ENCOUNTER — HOSPITAL ENCOUNTER (OUTPATIENT)
Dept: RADIOLOGY | Facility: HOSPITAL | Age: 89
Discharge: HOME | End: 2024-02-14
Attending: SURGERY
Payer: MEDICARE

## 2024-02-14 DIAGNOSIS — R92.8 ABNORMAL MAMMOGRAM: ICD-10-CM

## 2024-02-14 PROCEDURE — 76642 ULTRASOUND BREAST LIMITED: CPT | Mod: TC,RT

## 2024-02-14 PROCEDURE — 77061 BREAST TOMOSYNTHESIS UNI: CPT | Mod: TC,RT

## 2024-02-14 PROCEDURE — 77065 DX MAMMO INCL CAD UNI: CPT | Mod: 26,RT,, | Performed by: RADIOLOGY

## 2024-02-14 PROCEDURE — 77065 DX MAMMO INCL CAD UNI: CPT | Mod: TC,RT

## 2024-02-14 PROCEDURE — 77061 BREAST TOMOSYNTHESIS UNI: CPT | Mod: 26,RT,, | Performed by: RADIOLOGY

## 2024-02-14 PROCEDURE — 76642 ULTRASOUND BREAST LIMITED: CPT | Mod: 26,RT,, | Performed by: RADIOLOGY

## 2024-07-30 ENCOUNTER — OFFICE VISIT (OUTPATIENT)
Dept: INTERNAL MEDICINE | Facility: CLINIC | Age: 89
End: 2024-07-30
Attending: INTERNAL MEDICINE
Payer: MEDICARE

## 2024-07-30 VITALS
WEIGHT: 110 LBS | OXYGEN SATURATION: 97 % | DIASTOLIC BLOOD PRESSURE: 68 MMHG | HEART RATE: 83 BPM | SYSTOLIC BLOOD PRESSURE: 114 MMHG | HEIGHT: 61 IN | BODY MASS INDEX: 20.77 KG/M2

## 2024-07-30 DIAGNOSIS — D50.9 IRON DEFICIENCY ANEMIA, UNSPECIFIED IRON DEFICIENCY ANEMIA TYPE: ICD-10-CM

## 2024-07-30 DIAGNOSIS — E03.9 ACQUIRED HYPOTHYROIDISM: Primary | ICD-10-CM

## 2024-07-30 DIAGNOSIS — N18.31 STAGE 3A CHRONIC KIDNEY DISEASE: ICD-10-CM

## 2024-07-30 DIAGNOSIS — M81.0 OSTEOPOROSIS, POST-MENOPAUSAL: ICD-10-CM

## 2024-07-30 DIAGNOSIS — Z17.0 MALIGNANT NEOPLASM OF CENTRAL PORTION OF LEFT BREAST IN FEMALE, ESTROGEN RECEPTOR POSITIVE: ICD-10-CM

## 2024-07-30 DIAGNOSIS — I10 ESSENTIAL HYPERTENSION: ICD-10-CM

## 2024-07-30 DIAGNOSIS — D05.12 BREAST NEOPLASM, TIS (DCIS), LEFT: ICD-10-CM

## 2024-07-30 DIAGNOSIS — C50.112 MALIGNANT NEOPLASM OF CENTRAL PORTION OF LEFT BREAST IN FEMALE, ESTROGEN RECEPTOR POSITIVE: ICD-10-CM

## 2024-07-30 DIAGNOSIS — R41.3 MEMORY LOSS: ICD-10-CM

## 2024-07-30 DIAGNOSIS — R63.4 WEIGHT LOSS, ABNORMAL: ICD-10-CM

## 2024-07-30 DIAGNOSIS — N25.81 HYPERPARATHYROIDISM DUE TO RENAL INSUFFICIENCY: ICD-10-CM

## 2024-07-30 DIAGNOSIS — F41.9 ANXIETY: ICD-10-CM

## 2024-07-30 DIAGNOSIS — J45.20 MILD INTERMITTENT ASTHMA WITHOUT COMPLICATION: ICD-10-CM

## 2024-07-30 DIAGNOSIS — F45.8 TEETH GRINDING: ICD-10-CM

## 2024-07-30 DIAGNOSIS — Z13.39 SCREENING FOR ALCOHOLISM: ICD-10-CM

## 2024-07-30 DIAGNOSIS — D69.6 THROMBOCYTOPENIA: ICD-10-CM

## 2024-07-30 DIAGNOSIS — Z13.31 SCREENING FOR DEPRESSION: ICD-10-CM

## 2024-07-30 PROCEDURE — G0442 ANNUAL ALCOHOL SCREEN 15 MIN: HCPCS | Mod: 59,S$GLB,, | Performed by: INTERNAL MEDICINE

## 2024-07-30 PROCEDURE — 1159F MED LIST DOCD IN RCRD: CPT | Mod: CPTII,S$GLB,, | Performed by: INTERNAL MEDICINE

## 2024-07-30 PROCEDURE — 99215 OFFICE O/P EST HI 40 MIN: CPT | Mod: S$GLB,,, | Performed by: INTERNAL MEDICINE

## 2024-07-30 PROCEDURE — 1160F RVW MEDS BY RX/DR IN RCRD: CPT | Mod: CPTII,S$GLB,, | Performed by: INTERNAL MEDICINE

## 2024-07-30 PROCEDURE — G0444 DEPRESSION SCREEN ANNUAL: HCPCS | Mod: 59,S$GLB,, | Performed by: INTERNAL MEDICINE

## 2024-07-30 RX ORDER — FLUTICASONE FUROATE AND VILANTEROL 200; 25 UG/1; UG/1
1 POWDER RESPIRATORY (INHALATION) NIGHTLY
Qty: 60 EACH | Refills: 11 | Status: SHIPPED | OUTPATIENT
Start: 2024-07-30

## 2024-07-30 RX ORDER — MIRTAZAPINE 7.5 MG/1
7.5 TABLET, FILM COATED ORAL NIGHTLY
Qty: 30 TABLET | Refills: 11 | Status: SHIPPED | OUTPATIENT
Start: 2024-07-30 | End: 2025-07-30

## 2024-07-30 RX ORDER — DONEPEZIL HYDROCHLORIDE 5 MG/1
5 TABLET, FILM COATED ORAL NIGHTLY
Qty: 90 TABLET | Refills: 3 | Status: SHIPPED | OUTPATIENT
Start: 2024-07-30 | End: 2025-07-30

## 2024-07-30 RX ORDER — LEVOTHYROXINE SODIUM 100 UG/1
100 TABLET ORAL
Qty: 90 TABLET | Refills: 3 | Status: SHIPPED | OUTPATIENT
Start: 2024-07-30

## 2024-07-30 RX ORDER — LOSARTAN POTASSIUM 100 MG/1
100 TABLET ORAL DAILY
Qty: 90 TABLET | Refills: 3 | Status: SHIPPED | OUTPATIENT
Start: 2024-07-30

## 2024-07-30 NOTE — PROGRESS NOTES
Subjective     Patient ID: Ashely Holman is a 89 y.o. female.    Chief Complaint: Follow-up (Did not get labs in dec, Needs refills on all medications, has no apatite, grinding her teeth a lot )    She never had the labs were ordered in December of 2013.  She has lost 12 pounds over the past 8 months.  She will eat when food is put in front of her, but does not seek out food.  She needs a new daytime sitter while her daughter is at work.  She grinds her teeth.      Hypertension  This is a chronic problem. The current episode started more than 1 year ago. The problem is controlled.           Follow-up  This is a chronic problem. The current episode started more than 1 year ago. The problem has been unchanged. Associated symptoms include coughing. Pertinent negatives include no chest pain.     Review of Systems   Constitutional:  Positive for unexpected weight change.   Respiratory:  Positive for cough. Negative for shortness of breath.    Cardiovascular:  Negative for chest pain.          Objective     Physical Exam  Vitals and nursing note reviewed.   Constitutional:       Appearance: She is well-developed.   HENT:      Head: Normocephalic.   Eyes:      Pupils: Pupils are equal, round, and reactive to light.   Cardiovascular:      Rate and Rhythm: Normal rate and regular rhythm.      Heart sounds: Normal heart sounds.   Pulmonary:      Effort: Pulmonary effort is normal.      Breath sounds: Rhonchi present.   Neurological:      Mental Status: She is alert.            Assessment and Plan     1. Acquired hypothyroidism    2. Mild intermittent asthma without complication    3. Essential hypertension    4. Memory loss    5. Malignant neoplasm of central portion of left breast in female, estrogen receptor positive    6. Hyperparathyroidism due to renal insufficiency    7. Thrombocytopenia  Overview:  9/16      8. Stage 3a chronic kidney disease    9. Screening for alcoholism    10. Screening for depression    11.  Weight loss, abnormal    12. Teeth grinding    13. Breast neoplasm, Tis (DCIS), left  Overview:  S/p partial mastectomy left breast 3/23 and 5/23 and XRT      14. Iron deficiency anemia, unspecified iron deficiency anemia type  Overview:  1990  video cam neg for bleed 2010  Dr. Teresita CHRISTIANSEN 2011 - [facility]      15. Osteoporosis, post-menopausal  Overview:  Heel scan 8/14      Other orders  -     mirtazapine (REMERON) 7.5 MG Tab; Take 1 tablet (7.5 mg total) by mouth every evening.  Dispense: 30 tablet; Refill: 11        PLAN:  Per orders and D/C instructions.  Continue diet and/or meds for hypothyroidism, hyperparathyroidism, asthma, memory loss, anemia, osteoporosis, thrombocytopenia, CKD, and hypertension, which are stable.  There is no evidence of recurrence of breast neoplasm.  Start mirtazapine 7.5 mg nightly to help with weight gain, and grinding of teeth, and anxiety.  Check routine labs today.    Screening: The patient was screened for depression with the PHQ2 questionnaire and possible health consequences were discussed with the patient, who understands (15 minutes spent).       The patient was screened for the misuse of alcohol, by asking the number of drinks per average week, and if pt has had more than 4 drinks (more than 3 for women and elderly) in 1 day within the past year. The health and legal consequences of misuse were discussed (15 minutes spent).        Follow up in about 6 months (around 1/30/2025).

## 2025-02-11 ENCOUNTER — OFFICE VISIT (OUTPATIENT)
Dept: INTERNAL MEDICINE | Facility: CLINIC | Age: OVER 89
End: 2025-02-11
Attending: INTERNAL MEDICINE
Payer: MEDICARE

## 2025-02-11 VITALS
WEIGHT: 114 LBS | SYSTOLIC BLOOD PRESSURE: 102 MMHG | DIASTOLIC BLOOD PRESSURE: 68 MMHG | BODY MASS INDEX: 21.52 KG/M2 | HEIGHT: 61 IN | HEART RATE: 71 BPM | OXYGEN SATURATION: 96 %

## 2025-02-11 DIAGNOSIS — J45.20 MILD INTERMITTENT ASTHMA WITHOUT COMPLICATION: ICD-10-CM

## 2025-02-11 DIAGNOSIS — Z13.39 SCREENING FOR ALCOHOLISM: ICD-10-CM

## 2025-02-11 DIAGNOSIS — E03.9 HYPOTHYROIDISM, UNSPECIFIED TYPE: ICD-10-CM

## 2025-02-11 DIAGNOSIS — R79.89 OTHER SPECIFIED ABNORMAL FINDINGS OF BLOOD CHEMISTRY: ICD-10-CM

## 2025-02-11 DIAGNOSIS — E78.9 DISORDER OF LIPID METABOLISM: ICD-10-CM

## 2025-02-11 DIAGNOSIS — N25.81 HYPERPARATHYROIDISM DUE TO RENAL INSUFFICIENCY: Primary | ICD-10-CM

## 2025-02-11 DIAGNOSIS — Z17.0 MALIGNANT NEOPLASM OF CENTRAL PORTION OF LEFT BREAST IN FEMALE, ESTROGEN RECEPTOR POSITIVE: ICD-10-CM

## 2025-02-11 DIAGNOSIS — C50.112 MALIGNANT NEOPLASM OF CENTRAL PORTION OF LEFT BREAST IN FEMALE, ESTROGEN RECEPTOR POSITIVE: ICD-10-CM

## 2025-02-11 DIAGNOSIS — D50.8 OTHER IRON DEFICIENCY ANEMIA: ICD-10-CM

## 2025-02-11 DIAGNOSIS — Z13.31 SCREENING FOR DEPRESSION: ICD-10-CM

## 2025-02-11 DIAGNOSIS — E03.9 ACQUIRED HYPOTHYROIDISM: Primary | ICD-10-CM

## 2025-02-11 DIAGNOSIS — E55.9 VITAMIN D DEFICIENCY: ICD-10-CM

## 2025-02-11 DIAGNOSIS — N18.32 CHRONIC KIDNEY DISEASE, STAGE 3B: ICD-10-CM

## 2025-02-11 DIAGNOSIS — I10 ESSENTIAL HYPERTENSION: ICD-10-CM

## 2025-02-11 DIAGNOSIS — D51.0 PERNICIOUS ANEMIA: ICD-10-CM

## 2025-02-11 PROCEDURE — G0444 DEPRESSION SCREEN ANNUAL: HCPCS | Mod: 59,S$GLB,, | Performed by: INTERNAL MEDICINE

## 2025-02-11 PROCEDURE — 99214 OFFICE O/P EST MOD 30 MIN: CPT | Mod: S$GLB,,, | Performed by: INTERNAL MEDICINE

## 2025-02-11 PROCEDURE — 1160F RVW MEDS BY RX/DR IN RCRD: CPT | Mod: CPTII,S$GLB,, | Performed by: INTERNAL MEDICINE

## 2025-02-11 PROCEDURE — 1159F MED LIST DOCD IN RCRD: CPT | Mod: CPTII,S$GLB,, | Performed by: INTERNAL MEDICINE

## 2025-02-11 PROCEDURE — G0442 ANNUAL ALCOHOL SCREEN 15 MIN: HCPCS | Mod: 59,S$GLB,, | Performed by: INTERNAL MEDICINE

## 2025-02-11 RX ORDER — IPRATROPIUM BROMIDE AND ALBUTEROL SULFATE 2.5; .5 MG/3ML; MG/3ML
3 SOLUTION RESPIRATORY (INHALATION) EVERY 6 HOURS PRN
Qty: 90 ML | Refills: 5 | Status: SHIPPED | OUTPATIENT
Start: 2025-02-11

## 2025-02-11 NOTE — PROGRESS NOTES
Subjective     Patient ID: Ashely Holman is a 89 y.o. female.    Chief Complaint: Annual Exam (Flu shot, has cough )    She has a nonproductive cough.  She does not like using the Breo because she thinks it is bad for her teeth.      Hypertension  This is a chronic problem. The current episode started more than 1 year ago. The problem is controlled.             Review of Systems   Constitutional: Negative.    Respiratory:  Positive for cough. Negative for shortness of breath.    Cardiovascular:  Negative for chest pain.          Objective     Physical Exam  Vitals and nursing note reviewed.   Constitutional:       Appearance: She is well-developed.   HENT:      Head: Normocephalic.   Eyes:      Pupils: Pupils are equal, round, and reactive to light.   Cardiovascular:      Rate and Rhythm: Normal rate and regular rhythm.      Heart sounds: Normal heart sounds.   Pulmonary:      Effort: Pulmonary effort is normal.   Neurological:      Mental Status: She is alert.            Assessment and Plan     1. Acquired hypothyroidism    2. Mild intermittent asthma without complication  -     influenza (adjuvanted) (Fluad) 45 mcg/0.5 mL IM vaccine (> or = 66 yo) 0.5 mL    3. Essential hypertension    4. Chronic kidney disease, stage 3b    5. Malignant neoplasm of central portion of left breast in female, estrogen receptor positive    6. Screening for depression    7. Screening for alcoholism    Other orders  -     albuterol-ipratropium (DUO-NEB) 2.5 mg-0.5 mg/3 mL nebulizer solution; Take 3 mLs by nebulization every 6 (six) hours as needed for Wheezing. Rescue  Dispense: 90 mL; Refill: 5        PLAN:  Per orders and D/C instructions.  Continue diet and/or meds for hypothyroidism, CKD, and HTN, which are stable.  She has no sign of recurrence of breast cancer.   Add DuoNebs per patient request for asthma.  Flu shot today.  Check routine labs.    Screening: The patient was screened for depression with the PHQ2 questionnaire  and possible health consequences were discussed with the patient, who understands (15 minutes spent).       The patient was screened for the misuse of alcohol, by asking the number of drinks per average week, and if pt has had more than 4 drinks (more than 3 for women and elderly) in 1 day within the past year. The health and legal consequences of misuse were discussed (15 minutes spent).     Follow up in about 6 months (around 8/11/2025).

## 2025-04-19 ENCOUNTER — RESULTS FOLLOW-UP (OUTPATIENT)
Dept: INTERNAL MEDICINE | Facility: CLINIC | Age: OVER 89
End: 2025-04-19

## 2025-08-05 RX ORDER — LEVOTHYROXINE SODIUM 100 UG/1
100 TABLET ORAL
Qty: 90 TABLET | Refills: 3 | Status: SHIPPED | OUTPATIENT
Start: 2025-08-05

## 2025-08-12 ENCOUNTER — OFFICE VISIT (OUTPATIENT)
Dept: INTERNAL MEDICINE | Facility: CLINIC | Age: OVER 89
End: 2025-08-12
Attending: INTERNAL MEDICINE
Payer: MEDICARE

## 2025-08-12 VITALS
OXYGEN SATURATION: 97 % | SYSTOLIC BLOOD PRESSURE: 108 MMHG | HEART RATE: 80 BPM | BODY MASS INDEX: 20.39 KG/M2 | HEIGHT: 61 IN | DIASTOLIC BLOOD PRESSURE: 78 MMHG | WEIGHT: 108 LBS

## 2025-08-12 DIAGNOSIS — R53.83 FATIGUE, UNSPECIFIED TYPE: ICD-10-CM

## 2025-08-12 DIAGNOSIS — E78.9 DISORDER OF LIPID METABOLISM: Primary | ICD-10-CM

## 2025-08-12 DIAGNOSIS — D50.8 OTHER IRON DEFICIENCY ANEMIA: ICD-10-CM

## 2025-08-12 DIAGNOSIS — D50.9 IRON DEFICIENCY ANEMIA, UNSPECIFIED IRON DEFICIENCY ANEMIA TYPE: ICD-10-CM

## 2025-08-12 DIAGNOSIS — J45.20 MILD INTERMITTENT ASTHMA WITHOUT COMPLICATION: ICD-10-CM

## 2025-08-12 DIAGNOSIS — E03.9 HYPOTHYROIDISM, UNSPECIFIED TYPE: ICD-10-CM

## 2025-08-12 DIAGNOSIS — E03.9 ACQUIRED HYPOTHYROIDISM: Primary | ICD-10-CM

## 2025-08-12 DIAGNOSIS — Z91.199 PROBLEM WITH HIGHLY COMPLEX TREATMENT REGIME: ICD-10-CM

## 2025-08-12 DIAGNOSIS — D51.0 PERNICIOUS ANEMIA: ICD-10-CM

## 2025-08-12 DIAGNOSIS — M81.0 OSTEOPOROSIS, POST-MENOPAUSAL: ICD-10-CM

## 2025-08-12 DIAGNOSIS — N18.32 CHRONIC KIDNEY DISEASE, STAGE 3B: ICD-10-CM

## 2025-08-12 DIAGNOSIS — E55.9 VITAMIN D DEFICIENCY: ICD-10-CM

## 2025-08-12 DIAGNOSIS — I10 ESSENTIAL HYPERTENSION: ICD-10-CM

## 2025-08-12 DIAGNOSIS — R79.89 OTHER SPECIFIED ABNORMAL FINDINGS OF BLOOD CHEMISTRY: ICD-10-CM

## 2025-08-12 PROCEDURE — 1159F MED LIST DOCD IN RCRD: CPT | Mod: CPTII,S$GLB,, | Performed by: INTERNAL MEDICINE

## 2025-08-12 PROCEDURE — 90677 PCV20 VACCINE IM: CPT | Mod: S$GLB,,, | Performed by: INTERNAL MEDICINE

## 2025-08-12 PROCEDURE — G2211 COMPLEX E/M VISIT ADD ON: HCPCS | Mod: S$GLB,,, | Performed by: INTERNAL MEDICINE

## 2025-08-12 PROCEDURE — G0009 ADMIN PNEUMOCOCCAL VACCINE: HCPCS | Mod: S$GLB,,, | Performed by: INTERNAL MEDICINE

## 2025-08-12 PROCEDURE — 1160F RVW MEDS BY RX/DR IN RCRD: CPT | Mod: CPTII,S$GLB,, | Performed by: INTERNAL MEDICINE

## 2025-08-12 PROCEDURE — 99214 OFFICE O/P EST MOD 30 MIN: CPT | Mod: S$GLB,,, | Performed by: INTERNAL MEDICINE

## 2025-08-12 RX ORDER — FERROUS GLUCONATE 324(38)MG
324 TABLET ORAL DAILY
COMMUNITY
Start: 2025-08-12

## 2025-08-12 RX ORDER — FLUTICASONE FUROATE AND VILANTEROL 200; 25 UG/1; UG/1
1 POWDER RESPIRATORY (INHALATION) NIGHTLY
Qty: 60 EACH | Refills: 11 | Status: SHIPPED | OUTPATIENT
Start: 2025-08-12

## 2025-08-12 RX ORDER — FERROUS GLUCONATE 324(38)MG
324 TABLET ORAL 2 TIMES DAILY
Start: 2025-08-12 | End: 2025-08-12

## (undated) DEVICE — SUT VICRYL PLUS 4-0 PS2 27

## (undated) DEVICE — CONTAINER SPEC OR STRL 4.5OZ

## (undated) DEVICE — SYR 10CC LUER LOCK

## (undated) DEVICE — MARGIN MARKER STANDARD 6 COLOR

## (undated) DEVICE — SPONGE BULKEE II ABSRB 6X6.75

## (undated) DEVICE — MARKER SKIN STND TIP BLUE BARR

## (undated) DEVICE — Device

## (undated) DEVICE — TOWEL OR DISP STRL BLUE 4/PK

## (undated) DEVICE — UNDERGLOVES BIOGEL PI SZ 7 LF

## (undated) DEVICE — DRAPE THREE-QTR REINF 53X77IN

## (undated) DEVICE — ADHESIVE DERMABOND ADVANCED

## (undated) DEVICE — ELECTRODE REM PLYHSV RETURN 9

## (undated) DEVICE — BRA CLASSIC COMFORM BLK SZ 38

## (undated) DEVICE — SUT 2/0 30IN SILK BLK BRAI

## (undated) DEVICE — GOWN POLY REINF BRTH SLV 2XL

## (undated) DEVICE — COVER PROBE US 5.5X58L NON LTX

## (undated) DEVICE — DRAPE STERI INSTRUMENT 1018

## (undated) DEVICE — ELECTRODE BLADE INSULATED 1 IN

## (undated) DEVICE — NDL HYPO REG 25G X 1 1/2

## (undated) DEVICE — APPLICATOR CHLORAPREP ORN 26ML

## (undated) DEVICE — SUT VICRYL 3-0 27 SH

## (undated) DEVICE — SOL IRRI STRL WATER 1000ML

## (undated) DEVICE — GLOVE BIOGEL SKINSENSE PI 6.5